# Patient Record
Sex: FEMALE | Race: WHITE | NOT HISPANIC OR LATINO | URBAN - METROPOLITAN AREA
[De-identification: names, ages, dates, MRNs, and addresses within clinical notes are randomized per-mention and may not be internally consistent; named-entity substitution may affect disease eponyms.]

---

## 2017-09-12 ENCOUNTER — EMERGENCY (EMERGENCY)
Facility: HOSPITAL | Age: 77
LOS: 0 days | Discharge: HOME | End: 2017-09-12
Attending: EMERGENCY MEDICINE | Admitting: EMERGENCY MEDICINE
Payer: MEDICARE

## 2017-09-12 VITALS
TEMPERATURE: 98 F | OXYGEN SATURATION: 99 % | SYSTOLIC BLOOD PRESSURE: 138 MMHG | HEART RATE: 74 BPM | RESPIRATION RATE: 18 BRPM | DIASTOLIC BLOOD PRESSURE: 74 MMHG

## 2017-09-12 VITALS
OXYGEN SATURATION: 99 % | DIASTOLIC BLOOD PRESSURE: 72 MMHG | RESPIRATION RATE: 18 BRPM | HEART RATE: 71 BPM | SYSTOLIC BLOOD PRESSURE: 135 MMHG | TEMPERATURE: 98 F

## 2017-09-12 DIAGNOSIS — N39.0 URINARY TRACT INFECTION, SITE NOT SPECIFIED: ICD-10-CM

## 2017-09-12 DIAGNOSIS — M47.9 SPONDYLOSIS, UNSPECIFIED: ICD-10-CM

## 2017-09-12 DIAGNOSIS — G62.9 POLYNEUROPATHY, UNSPECIFIED: ICD-10-CM

## 2017-09-12 DIAGNOSIS — R29.6 REPEATED FALLS: ICD-10-CM

## 2017-09-12 DIAGNOSIS — M50.30 OTHER CERVICAL DISC DEGENERATION, UNSPECIFIED CERVICAL REGION: ICD-10-CM

## 2017-09-12 DIAGNOSIS — I67.89 OTHER CEREBROVASCULAR DISEASE: ICD-10-CM

## 2017-09-12 DIAGNOSIS — M54.9 DORSALGIA, UNSPECIFIED: ICD-10-CM

## 2017-09-12 DIAGNOSIS — G20 PARKINSON'S DISEASE: ICD-10-CM

## 2017-09-12 DIAGNOSIS — M41.9 SCOLIOSIS, UNSPECIFIED: ICD-10-CM

## 2017-09-12 LAB
ALBUMIN SERPL ELPH-MCNC: 4 G/DL — SIGNIFICANT CHANGE UP (ref 3.3–5)
ALP SERPL-CCNC: 74 U/L — SIGNIFICANT CHANGE UP (ref 40–120)
ALT FLD-CCNC: 10 U/L — LOW (ref 12–78)
ANION GAP SERPL CALC-SCNC: 7 MMOL/L — SIGNIFICANT CHANGE UP (ref 5–17)
APPEARANCE UR: CLEAR — SIGNIFICANT CHANGE UP
AST SERPL-CCNC: 24 U/L — SIGNIFICANT CHANGE UP (ref 15–37)
BACTERIA # UR AUTO: (no result)
BASOPHILS # BLD AUTO: 0.1 K/UL — SIGNIFICANT CHANGE UP (ref 0–0.2)
BASOPHILS NFR BLD AUTO: 1.5 % — SIGNIFICANT CHANGE UP (ref 0–2)
BILIRUB SERPL-MCNC: 0.4 MG/DL — SIGNIFICANT CHANGE UP (ref 0.2–1.2)
BILIRUB UR-MCNC: NEGATIVE — SIGNIFICANT CHANGE UP
BUN SERPL-MCNC: 21 MG/DL — SIGNIFICANT CHANGE UP (ref 7–23)
CALCIUM SERPL-MCNC: 9.3 MG/DL — SIGNIFICANT CHANGE UP (ref 8.5–10.1)
CHLORIDE SERPL-SCNC: 103 MMOL/L — SIGNIFICANT CHANGE UP (ref 96–108)
CO2 SERPL-SCNC: 31 MMOL/L — SIGNIFICANT CHANGE UP (ref 22–31)
COLOR SPEC: YELLOW — SIGNIFICANT CHANGE UP
CREAT SERPL-MCNC: 0.89 MG/DL — SIGNIFICANT CHANGE UP (ref 0.5–1.3)
DIFF PNL FLD: NEGATIVE — SIGNIFICANT CHANGE UP
EOSINOPHIL # BLD AUTO: 0.1 K/UL — SIGNIFICANT CHANGE UP (ref 0–0.5)
EOSINOPHIL NFR BLD AUTO: 1.7 % — SIGNIFICANT CHANGE UP (ref 0–6)
EPI CELLS # UR: SIGNIFICANT CHANGE UP
GLUCOSE SERPL-MCNC: 120 MG/DL — HIGH (ref 70–99)
GLUCOSE UR QL: NEGATIVE MG/DL — SIGNIFICANT CHANGE UP
HCT VFR BLD CALC: 38.7 % — SIGNIFICANT CHANGE UP (ref 34.5–45)
HGB BLD-MCNC: 12.9 G/DL — SIGNIFICANT CHANGE UP (ref 11.5–15.5)
KETONES UR-MCNC: NEGATIVE — SIGNIFICANT CHANGE UP
LEUKOCYTE ESTERASE UR-ACNC: (no result)
LYMPHOCYTES # BLD AUTO: 2.2 K/UL — SIGNIFICANT CHANGE UP (ref 1–3.3)
LYMPHOCYTES # BLD AUTO: 32.3 % — SIGNIFICANT CHANGE UP (ref 13–44)
MCHC RBC-ENTMCNC: 30 PG — SIGNIFICANT CHANGE UP (ref 27–34)
MCHC RBC-ENTMCNC: 33.3 GM/DL — SIGNIFICANT CHANGE UP (ref 32–36)
MCV RBC AUTO: 90 FL — SIGNIFICANT CHANGE UP (ref 80–100)
MONOCYTES # BLD AUTO: 0.8 K/UL — SIGNIFICANT CHANGE UP (ref 0–0.9)
MONOCYTES NFR BLD AUTO: 11.6 % — SIGNIFICANT CHANGE UP (ref 2–14)
NEUTROPHILS # BLD AUTO: 3.6 K/UL — SIGNIFICANT CHANGE UP (ref 1.8–7.4)
NEUTROPHILS NFR BLD AUTO: 52.8 % — SIGNIFICANT CHANGE UP (ref 43–77)
NITRITE UR-MCNC: NEGATIVE — SIGNIFICANT CHANGE UP
PH UR: 7 — SIGNIFICANT CHANGE UP (ref 5–8)
PLATELET # BLD AUTO: 270 K/UL — SIGNIFICANT CHANGE UP (ref 150–400)
POTASSIUM SERPL-MCNC: 3.8 MMOL/L — SIGNIFICANT CHANGE UP (ref 3.5–5.3)
POTASSIUM SERPL-SCNC: 3.8 MMOL/L — SIGNIFICANT CHANGE UP (ref 3.5–5.3)
PROT SERPL-MCNC: 6.9 GM/DL — SIGNIFICANT CHANGE UP (ref 6–8.3)
PROT UR-MCNC: NEGATIVE MG/DL — SIGNIFICANT CHANGE UP
RBC # BLD: 4.3 M/UL — SIGNIFICANT CHANGE UP (ref 3.8–5.2)
RBC # FLD: 12.5 % — SIGNIFICANT CHANGE UP (ref 10.3–14.5)
RBC CASTS # UR COMP ASSIST: NEGATIVE /HPF — SIGNIFICANT CHANGE UP (ref 0–4)
SODIUM SERPL-SCNC: 141 MMOL/L — SIGNIFICANT CHANGE UP (ref 135–145)
SP GR SPEC: 1.01 — SIGNIFICANT CHANGE UP (ref 1.01–1.02)
UROBILINOGEN FLD QL: NEGATIVE MG/DL — SIGNIFICANT CHANGE UP
WBC # BLD: 6.8 K/UL — SIGNIFICANT CHANGE UP (ref 3.8–10.5)
WBC # FLD AUTO: 6.8 K/UL — SIGNIFICANT CHANGE UP (ref 3.8–10.5)
WBC UR QL: SIGNIFICANT CHANGE UP

## 2017-09-12 PROCEDURE — 99284 EMERGENCY DEPT VISIT MOD MDM: CPT

## 2017-09-12 PROCEDURE — 72190 X-RAY EXAM OF PELVIS: CPT | Mod: 26

## 2017-09-12 PROCEDURE — 70450 CT HEAD/BRAIN W/O DYE: CPT | Mod: 26

## 2017-09-12 PROCEDURE — 72125 CT NECK SPINE W/O DYE: CPT | Mod: 26

## 2017-09-12 PROCEDURE — 71010: CPT | Mod: 26

## 2017-09-12 PROCEDURE — 93010 ELECTROCARDIOGRAM REPORT: CPT

## 2017-09-12 RX ORDER — CEPHALEXIN 500 MG
500 CAPSULE ORAL ONCE
Qty: 0 | Refills: 0 | Status: DISCONTINUED | OUTPATIENT
Start: 2017-09-12 | End: 2017-09-12

## 2017-09-12 RX ORDER — CEPHALEXIN 500 MG
1 CAPSULE ORAL
Qty: 30 | Refills: 0 | OUTPATIENT
Start: 2017-09-12 | End: 2017-09-22

## 2017-09-12 NOTE — ED PROVIDER NOTE - MEDICAL DECISION MAKING DETAILS
Pt with 2 falls today at [facility], 1 fall recently, no complaints on arrival. Plan labs/ua, CTH CTCspine, XRs, reeval

## 2017-09-12 NOTE — ED PROVIDER NOTE - OBJECTIVE STATEMENT
76 y/o F w/ hx of Parkinson's dz, neuropathy, scoliosis, presents to ED s/p 2 falls today. Pt also fell 2 days ago. As per family at bedside the pt's mental status is per baseline. Pt states in morning she is dizzy and pt normally has back pain but right  now denies pain, dizziness, lightheadedness, urinary sx, or any other acute complaints.

## 2017-09-13 LAB
CULTURE RESULTS: SIGNIFICANT CHANGE UP
SPECIMEN SOURCE: SIGNIFICANT CHANGE UP

## 2017-09-22 ENCOUNTER — EMERGENCY (EMERGENCY)
Facility: HOSPITAL | Age: 77
LOS: 0 days | Discharge: HOME | End: 2017-09-22
Attending: EMERGENCY MEDICINE | Admitting: EMERGENCY MEDICINE
Payer: MEDICARE

## 2017-09-22 VITALS
RESPIRATION RATE: 16 BRPM | HEIGHT: 66 IN | HEART RATE: 68 BPM | OXYGEN SATURATION: 97 % | WEIGHT: 164.91 LBS | DIASTOLIC BLOOD PRESSURE: 95 MMHG | TEMPERATURE: 98 F | SYSTOLIC BLOOD PRESSURE: 190 MMHG

## 2017-09-22 DIAGNOSIS — F02.80 DEMENTIA IN OTHER DISEASES CLASSIFIED ELSEWHERE, UNSPECIFIED SEVERITY, WITHOUT BEHAVIORAL DISTURBANCE, PSYCHOTIC DISTURBANCE, MOOD DISTURBANCE, AND ANXIETY: ICD-10-CM

## 2017-09-22 DIAGNOSIS — R41.82 ALTERED MENTAL STATUS, UNSPECIFIED: ICD-10-CM

## 2017-09-22 DIAGNOSIS — G20 PARKINSON'S DISEASE: ICD-10-CM

## 2017-09-22 LAB
ALBUMIN SERPL ELPH-MCNC: 3.7 G/DL — SIGNIFICANT CHANGE UP (ref 3.3–5)
ALP SERPL-CCNC: 78 U/L — SIGNIFICANT CHANGE UP (ref 40–120)
ALT FLD-CCNC: 13 U/L — SIGNIFICANT CHANGE UP (ref 12–78)
ANION GAP SERPL CALC-SCNC: 3 MMOL/L — LOW (ref 5–17)
AST SERPL-CCNC: 26 U/L — SIGNIFICANT CHANGE UP (ref 15–37)
BILIRUB SERPL-MCNC: 0.3 MG/DL — SIGNIFICANT CHANGE UP (ref 0.2–1.2)
BUN SERPL-MCNC: 21 MG/DL — SIGNIFICANT CHANGE UP (ref 7–23)
CALCIUM SERPL-MCNC: 9.2 MG/DL — SIGNIFICANT CHANGE UP (ref 8.5–10.1)
CHLORIDE SERPL-SCNC: 109 MMOL/L — HIGH (ref 96–108)
CO2 SERPL-SCNC: 31 MMOL/L — SIGNIFICANT CHANGE UP (ref 22–31)
CREAT SERPL-MCNC: 0.72 MG/DL — SIGNIFICANT CHANGE UP (ref 0.5–1.3)
GLUCOSE SERPL-MCNC: 84 MG/DL — SIGNIFICANT CHANGE UP (ref 70–99)
HCT VFR BLD CALC: 41.1 % — SIGNIFICANT CHANGE UP (ref 34.5–45)
HGB BLD-MCNC: 13.7 G/DL — SIGNIFICANT CHANGE UP (ref 11.5–15.5)
MCHC RBC-ENTMCNC: 30.5 PG — SIGNIFICANT CHANGE UP (ref 27–34)
MCHC RBC-ENTMCNC: 33.2 GM/DL — SIGNIFICANT CHANGE UP (ref 32–36)
MCV RBC AUTO: 91.8 FL — SIGNIFICANT CHANGE UP (ref 80–100)
PLATELET # BLD AUTO: 260 K/UL — SIGNIFICANT CHANGE UP (ref 150–400)
POTASSIUM SERPL-MCNC: 4.1 MMOL/L — SIGNIFICANT CHANGE UP (ref 3.5–5.3)
POTASSIUM SERPL-SCNC: 4.1 MMOL/L — SIGNIFICANT CHANGE UP (ref 3.5–5.3)
PROT SERPL-MCNC: 7 GM/DL — SIGNIFICANT CHANGE UP (ref 6–8.3)
RBC # BLD: 4.48 M/UL — SIGNIFICANT CHANGE UP (ref 3.8–5.2)
RBC # FLD: 12.3 % — SIGNIFICANT CHANGE UP (ref 10.3–14.5)
SODIUM SERPL-SCNC: 143 MMOL/L — SIGNIFICANT CHANGE UP (ref 135–145)
WBC # BLD: 6.1 K/UL — SIGNIFICANT CHANGE UP (ref 3.8–10.5)
WBC # FLD AUTO: 6.1 K/UL — SIGNIFICANT CHANGE UP (ref 3.8–10.5)

## 2017-09-22 PROCEDURE — 93010 ELECTROCARDIOGRAM REPORT: CPT

## 2017-09-22 PROCEDURE — 70450 CT HEAD/BRAIN W/O DYE: CPT | Mod: 26

## 2017-09-22 PROCEDURE — 99285 EMERGENCY DEPT VISIT HI MDM: CPT

## 2017-09-22 NOTE — ED ADULT NURSE NOTE - CHIEF COMPLAINT QUOTE
staff at [facility] states they entered room patient awake, but not responding to questions, when EMS arrived patient states she was faking it bcs she did not want to speak to staff

## 2017-09-22 NOTE — ED ADULT NURSE NOTE - OBJECTIVE STATEMENT
Pt was reported to have her eyes open but not responding to staff at [facility]. When the patient was asked why she wasn't responding she stated " I don't like people bothering me in the morning and I didn't want to talk to them" . pt has no current complaints.

## 2017-09-22 NOTE — ED PROVIDER NOTE - OBJECTIVE STATEMENT
76 y/o with PMHx of dementia and Parkinson's disease sent to the ED from [facility] after pt was found unresponsive in bed this morning. pt was found wet and unreponsive for a few minutes by staff at nursing home. Pt states that she got up to use the shower and walked back to her bed. Hx is unreliable due to dementia. No hx of fall. No CP or SOB. 78 y/o with PMHx of dementia and Parkinson's disease sent to the ED from [facility] after pt was found alert in bed this morning but was not verbally responsive to staff. pt was found wet in bed this morning and was not answering any questions from staff members. Pt states that she got up to use the shower and walked back to her bed. pt was not answering questions because she did not want to speak with staff members. Hx is unreliable due to dementia. No hx of fall. No CP or SOB.

## 2017-09-22 NOTE — ED PROVIDER NOTE - PROGRESS NOTE DETAILS
Pt appears well. Laugh ting and making jokes. Unclear what happened or whether it was do to her dementia but they will f/u with neurology.

## 2017-09-22 NOTE — ED PROVIDER NOTE - CONSTITUTIONAL, MLM
normal... Well appearing, well nourished, awake, alert, oriented to person, place, time/situation and in no apparent distress. Pt is pleasant.

## 2017-09-22 NOTE — ED ADULT TRIAGE NOTE - CHIEF COMPLAINT QUOTE
staff at [facility] states they entered room patient awake, but not responding to questions, when EMS arrived patient states she was faking it bcs she did not want to speak to staff

## 2017-11-11 ENCOUNTER — RESULT REVIEW (OUTPATIENT)
Age: 77
End: 2017-11-11

## 2017-11-11 ENCOUNTER — INPATIENT (INPATIENT)
Facility: HOSPITAL | Age: 77
LOS: 1 days | Discharge: NURSING, REHAB OR LONG-TERM CARE | End: 2017-11-13
Attending: INTERNAL MEDICINE | Admitting: FAMILY MEDICINE
Payer: MEDICARE

## 2017-11-11 VITALS
HEART RATE: 74 BPM | SYSTOLIC BLOOD PRESSURE: 120 MMHG | RESPIRATION RATE: 16 BRPM | DIASTOLIC BLOOD PRESSURE: 58 MMHG | WEIGHT: 121.03 LBS | TEMPERATURE: 98 F | HEIGHT: 63 IN | OXYGEN SATURATION: 100 %

## 2017-11-11 DIAGNOSIS — G62.9 POLYNEUROPATHY, UNSPECIFIED: ICD-10-CM

## 2017-11-11 DIAGNOSIS — S72.009A FRACTURE OF UNSPECIFIED PART OF NECK OF UNSPECIFIED FEMUR, INITIAL ENCOUNTER FOR CLOSED FRACTURE: ICD-10-CM

## 2017-11-11 DIAGNOSIS — E87.6 HYPOKALEMIA: ICD-10-CM

## 2017-11-11 DIAGNOSIS — G20 PARKINSON'S DISEASE: ICD-10-CM

## 2017-11-11 DIAGNOSIS — Z96.649 PRESENCE OF UNSPECIFIED ARTIFICIAL HIP JOINT: Chronic | ICD-10-CM

## 2017-11-11 LAB
ABO RH CONFIRMATION: SIGNIFICANT CHANGE UP
ANION GAP SERPL CALC-SCNC: 4 MMOL/L — LOW (ref 5–17)
ANION GAP SERPL CALC-SCNC: 5 MMOL/L — SIGNIFICANT CHANGE UP (ref 5–17)
APPEARANCE UR: CLEAR — SIGNIFICANT CHANGE UP
APTT BLD: 31.1 SEC — SIGNIFICANT CHANGE UP (ref 27.5–37.4)
BASOPHILS # BLD AUTO: 0.1 K/UL — SIGNIFICANT CHANGE UP (ref 0–0.2)
BASOPHILS NFR BLD AUTO: 0.8 % — SIGNIFICANT CHANGE UP (ref 0–2)
BILIRUB UR-MCNC: NEGATIVE — SIGNIFICANT CHANGE UP
BLD GP AB SCN SERPL QL: SIGNIFICANT CHANGE UP
BUN SERPL-MCNC: 21 MG/DL — SIGNIFICANT CHANGE UP (ref 7–23)
BUN SERPL-MCNC: 29 MG/DL — HIGH (ref 7–23)
CALCIUM SERPL-MCNC: 7.7 MG/DL — LOW (ref 8.5–10.1)
CALCIUM SERPL-MCNC: 8.9 MG/DL — SIGNIFICANT CHANGE UP (ref 8.5–10.1)
CHLORIDE SERPL-SCNC: 106 MMOL/L — SIGNIFICANT CHANGE UP (ref 96–108)
CHLORIDE SERPL-SCNC: 108 MMOL/L — SIGNIFICANT CHANGE UP (ref 96–108)
CO2 SERPL-SCNC: 27 MMOL/L — SIGNIFICANT CHANGE UP (ref 22–31)
CO2 SERPL-SCNC: 30 MMOL/L — SIGNIFICANT CHANGE UP (ref 22–31)
COLOR SPEC: YELLOW — SIGNIFICANT CHANGE UP
CREAT SERPL-MCNC: 0.84 MG/DL — SIGNIFICANT CHANGE UP (ref 0.5–1.3)
CREAT SERPL-MCNC: 1.15 MG/DL — SIGNIFICANT CHANGE UP (ref 0.5–1.3)
DIFF PNL FLD: NEGATIVE — SIGNIFICANT CHANGE UP
EOSINOPHIL # BLD AUTO: 0.1 K/UL — SIGNIFICANT CHANGE UP (ref 0–0.5)
EOSINOPHIL NFR BLD AUTO: 1 % — SIGNIFICANT CHANGE UP (ref 0–6)
GLUCOSE SERPL-MCNC: 153 MG/DL — HIGH (ref 70–99)
GLUCOSE SERPL-MCNC: 96 MG/DL — SIGNIFICANT CHANGE UP (ref 70–99)
GLUCOSE UR QL: NEGATIVE MG/DL — SIGNIFICANT CHANGE UP
HCT VFR BLD CALC: 35.6 % — SIGNIFICANT CHANGE UP (ref 34.5–45)
HCT VFR BLD CALC: 37.2 % — SIGNIFICANT CHANGE UP (ref 34.5–45)
HGB BLD-MCNC: 11.3 G/DL — LOW (ref 11.5–15.5)
HGB BLD-MCNC: 12.3 G/DL — SIGNIFICANT CHANGE UP (ref 11.5–15.5)
INR BLD: 0.95 RATIO — SIGNIFICANT CHANGE UP (ref 0.88–1.16)
KETONES UR-MCNC: NEGATIVE — SIGNIFICANT CHANGE UP
LEUKOCYTE ESTERASE UR-ACNC: NEGATIVE — SIGNIFICANT CHANGE UP
LYMPHOCYTES # BLD AUTO: 19 % — SIGNIFICANT CHANGE UP (ref 13–44)
LYMPHOCYTES # BLD AUTO: 2.2 K/UL — SIGNIFICANT CHANGE UP (ref 1–3.3)
MCHC RBC-ENTMCNC: 29.5 PG — SIGNIFICANT CHANGE UP (ref 27–34)
MCHC RBC-ENTMCNC: 30.3 PG — SIGNIFICANT CHANGE UP (ref 27–34)
MCHC RBC-ENTMCNC: 31.8 GM/DL — LOW (ref 32–36)
MCHC RBC-ENTMCNC: 33.1 GM/DL — SIGNIFICANT CHANGE UP (ref 32–36)
MCV RBC AUTO: 91.5 FL — SIGNIFICANT CHANGE UP (ref 80–100)
MCV RBC AUTO: 92.9 FL — SIGNIFICANT CHANGE UP (ref 80–100)
MONOCYTES # BLD AUTO: 1.2 K/UL — HIGH (ref 0–0.9)
MONOCYTES NFR BLD AUTO: 10.3 % — SIGNIFICANT CHANGE UP (ref 2–14)
NEUTROPHILS # BLD AUTO: 7.9 K/UL — HIGH (ref 1.8–7.4)
NEUTROPHILS NFR BLD AUTO: 68.9 % — SIGNIFICANT CHANGE UP (ref 43–77)
NITRITE UR-MCNC: NEGATIVE — SIGNIFICANT CHANGE UP
PH UR: 7 — SIGNIFICANT CHANGE UP (ref 5–8)
PLATELET # BLD AUTO: 240 K/UL — SIGNIFICANT CHANGE UP (ref 150–400)
PLATELET # BLD AUTO: 263 K/UL — SIGNIFICANT CHANGE UP (ref 150–400)
POTASSIUM SERPL-MCNC: 3.3 MMOL/L — LOW (ref 3.5–5.3)
POTASSIUM SERPL-MCNC: 3.7 MMOL/L — SIGNIFICANT CHANGE UP (ref 3.5–5.3)
POTASSIUM SERPL-SCNC: 3.3 MMOL/L — LOW (ref 3.5–5.3)
POTASSIUM SERPL-SCNC: 3.7 MMOL/L — SIGNIFICANT CHANGE UP (ref 3.5–5.3)
PROT UR-MCNC: NEGATIVE MG/DL — SIGNIFICANT CHANGE UP
PROTHROM AB SERPL-ACNC: 10.2 SEC — SIGNIFICANT CHANGE UP (ref 9.8–12.7)
RBC # BLD: 3.83 M/UL — SIGNIFICANT CHANGE UP (ref 3.8–5.2)
RBC # BLD: 4.07 M/UL — SIGNIFICANT CHANGE UP (ref 3.8–5.2)
RBC # FLD: 12.1 % — SIGNIFICANT CHANGE UP (ref 10.3–14.5)
RBC # FLD: 12.3 % — SIGNIFICANT CHANGE UP (ref 10.3–14.5)
SODIUM SERPL-SCNC: 139 MMOL/L — SIGNIFICANT CHANGE UP (ref 135–145)
SODIUM SERPL-SCNC: 141 MMOL/L — SIGNIFICANT CHANGE UP (ref 135–145)
SP GR SPEC: 1.01 — SIGNIFICANT CHANGE UP (ref 1.01–1.02)
TYPE + AB SCN PNL BLD: SIGNIFICANT CHANGE UP
UROBILINOGEN FLD QL: NEGATIVE MG/DL — SIGNIFICANT CHANGE UP
WBC # BLD: 11.4 K/UL — HIGH (ref 3.8–10.5)
WBC # BLD: 17 K/UL — HIGH (ref 3.8–10.5)
WBC # FLD AUTO: 11.4 K/UL — HIGH (ref 3.8–10.5)
WBC # FLD AUTO: 17 K/UL — HIGH (ref 3.8–10.5)

## 2017-11-11 PROCEDURE — 88311 DECALCIFY TISSUE: CPT | Mod: 26

## 2017-11-11 PROCEDURE — 73502 X-RAY EXAM HIP UNI 2-3 VIEWS: CPT | Mod: 26

## 2017-11-11 PROCEDURE — 99221 1ST HOSP IP/OBS SF/LOW 40: CPT

## 2017-11-11 PROCEDURE — 71010: CPT | Mod: 26

## 2017-11-11 PROCEDURE — 99285 EMERGENCY DEPT VISIT HI MDM: CPT

## 2017-11-11 PROCEDURE — 73501 X-RAY EXAM HIP UNI 1 VIEW: CPT | Mod: 26,59,LT

## 2017-11-11 PROCEDURE — 88305 TISSUE EXAM BY PATHOLOGIST: CPT | Mod: 26

## 2017-11-11 PROCEDURE — 93010 ELECTROCARDIOGRAM REPORT: CPT

## 2017-11-11 PROCEDURE — 73552 X-RAY EXAM OF FEMUR 2/>: CPT | Mod: 26

## 2017-11-11 RX ORDER — CLONAZEPAM 1 MG
0.5 TABLET ORAL AT BEDTIME
Qty: 0 | Refills: 0 | Status: DISCONTINUED | OUTPATIENT
Start: 2017-11-11 | End: 2017-11-13

## 2017-11-11 RX ORDER — DOCUSATE SODIUM 100 MG
100 CAPSULE ORAL THREE TIMES A DAY
Qty: 0 | Refills: 0 | Status: DISCONTINUED | OUTPATIENT
Start: 2017-11-11 | End: 2017-11-13

## 2017-11-11 RX ORDER — CARBIDOPA AND LEVODOPA 25; 100 MG/1; MG/1
2 TABLET ORAL AT BEDTIME
Qty: 0 | Refills: 0 | Status: DISCONTINUED | OUTPATIENT
Start: 2017-11-11 | End: 2017-11-13

## 2017-11-11 RX ORDER — GABAPENTIN 400 MG/1
600 CAPSULE ORAL THREE TIMES A DAY
Qty: 0 | Refills: 0 | Status: DISCONTINUED | OUTPATIENT
Start: 2017-11-11 | End: 2017-11-13

## 2017-11-11 RX ORDER — DOCUSATE SODIUM 100 MG
100 CAPSULE ORAL
Qty: 0 | Refills: 0 | Status: DISCONTINUED | OUTPATIENT
Start: 2017-11-11 | End: 2017-11-13

## 2017-11-11 RX ORDER — CHOLECALCIFEROL (VITAMIN D3) 125 MCG
1000 CAPSULE ORAL DAILY
Qty: 0 | Refills: 0 | Status: DISCONTINUED | OUTPATIENT
Start: 2017-11-11 | End: 2017-11-13

## 2017-11-11 RX ORDER — MORPHINE SULFATE 50 MG/1
4 CAPSULE, EXTENDED RELEASE ORAL ONCE
Qty: 0 | Refills: 0 | Status: DISCONTINUED | OUTPATIENT
Start: 2017-11-11 | End: 2017-11-11

## 2017-11-11 RX ORDER — RASAGILINE 0.5 MG/1
1 TABLET ORAL DAILY
Qty: 0 | Refills: 0 | Status: DISCONTINUED | OUTPATIENT
Start: 2017-11-11 | End: 2017-11-13

## 2017-11-11 RX ORDER — DULOXETINE HYDROCHLORIDE 30 MG/1
30 CAPSULE, DELAYED RELEASE ORAL DAILY
Qty: 0 | Refills: 0 | Status: DISCONTINUED | OUTPATIENT
Start: 2017-11-11 | End: 2017-11-13

## 2017-11-11 RX ORDER — HYDROMORPHONE HYDROCHLORIDE 2 MG/ML
0.5 INJECTION INTRAMUSCULAR; INTRAVENOUS; SUBCUTANEOUS
Qty: 0 | Refills: 0 | Status: DISCONTINUED | OUTPATIENT
Start: 2017-11-11 | End: 2017-11-11

## 2017-11-11 RX ORDER — OXYCODONE HYDROCHLORIDE 5 MG/1
5 TABLET ORAL EVERY 4 HOURS
Qty: 0 | Refills: 0 | Status: DISCONTINUED | OUTPATIENT
Start: 2017-11-11 | End: 2017-11-13

## 2017-11-11 RX ORDER — FENTANYL CITRATE 50 UG/ML
50 INJECTION INTRAVENOUS
Qty: 0 | Refills: 0 | Status: DISCONTINUED | OUTPATIENT
Start: 2017-11-11 | End: 2017-11-11

## 2017-11-11 RX ORDER — MEPERIDINE HYDROCHLORIDE 50 MG/ML
12.5 INJECTION INTRAMUSCULAR; INTRAVENOUS; SUBCUTANEOUS
Qty: 0 | Refills: 0 | Status: DISCONTINUED | OUTPATIENT
Start: 2017-11-11 | End: 2017-11-11

## 2017-11-11 RX ORDER — ACETAMINOPHEN 500 MG
1000 TABLET ORAL ONCE
Qty: 0 | Refills: 0 | Status: COMPLETED | OUTPATIENT
Start: 2017-11-11 | End: 2017-11-11

## 2017-11-11 RX ORDER — CHOLECALCIFEROL (VITAMIN D3) 125 MCG
1000 CAPSULE ORAL DAILY
Qty: 0 | Refills: 0 | Status: DISCONTINUED | OUTPATIENT
Start: 2017-11-11 | End: 2017-11-11

## 2017-11-11 RX ORDER — HYDROMORPHONE HYDROCHLORIDE 2 MG/ML
0.5 INJECTION INTRAMUSCULAR; INTRAVENOUS; SUBCUTANEOUS
Qty: 0 | Refills: 0 | Status: DISCONTINUED | OUTPATIENT
Start: 2017-11-11 | End: 2017-11-13

## 2017-11-11 RX ORDER — CEFAZOLIN SODIUM 1 G
2000 VIAL (EA) INJECTION EVERY 8 HOURS
Qty: 0 | Refills: 0 | Status: COMPLETED | OUTPATIENT
Start: 2017-11-11 | End: 2017-11-12

## 2017-11-11 RX ORDER — FERROUS SULFATE 325(65) MG
325 TABLET ORAL
Qty: 0 | Refills: 0 | Status: DISCONTINUED | OUTPATIENT
Start: 2017-11-11 | End: 2017-11-13

## 2017-11-11 RX ORDER — CARBIDOPA AND LEVODOPA 25; 100 MG/1; MG/1
2 TABLET ORAL
Qty: 0 | Refills: 0 | Status: DISCONTINUED | OUTPATIENT
Start: 2017-11-11 | End: 2017-11-13

## 2017-11-11 RX ORDER — ENOXAPARIN SODIUM 100 MG/ML
40 INJECTION SUBCUTANEOUS EVERY 24 HOURS
Qty: 0 | Refills: 0 | Status: DISCONTINUED | OUTPATIENT
Start: 2017-11-12 | End: 2017-11-13

## 2017-11-11 RX ORDER — SENNA PLUS 8.6 MG/1
2 TABLET ORAL AT BEDTIME
Qty: 0 | Refills: 0 | Status: DISCONTINUED | OUTPATIENT
Start: 2017-11-11 | End: 2017-11-13

## 2017-11-11 RX ORDER — CARBIDOPA AND LEVODOPA 25; 100 MG/1; MG/1
2 TABLET ORAL DAILY
Qty: 0 | Refills: 0 | Status: DISCONTINUED | OUTPATIENT
Start: 2017-11-11 | End: 2017-11-11

## 2017-11-11 RX ORDER — MAGNESIUM HYDROXIDE 400 MG/1
30 TABLET, CHEWABLE ORAL
Qty: 0 | Refills: 0 | Status: DISCONTINUED | OUTPATIENT
Start: 2017-11-11 | End: 2017-11-13

## 2017-11-11 RX ORDER — CARBIDOPA AND LEVODOPA 25; 100 MG/1; MG/1
2 TABLET ORAL AT BEDTIME
Qty: 0 | Refills: 0 | Status: DISCONTINUED | OUTPATIENT
Start: 2017-11-11 | End: 2017-11-11

## 2017-11-11 RX ORDER — SODIUM CHLORIDE 9 MG/ML
1000 INJECTION INTRAMUSCULAR; INTRAVENOUS; SUBCUTANEOUS
Qty: 0 | Refills: 0 | Status: DISCONTINUED | OUTPATIENT
Start: 2017-11-11 | End: 2017-11-11

## 2017-11-11 RX ORDER — RASAGILINE 0.5 MG/1
1 TABLET ORAL DAILY
Qty: 0 | Refills: 0 | Status: DISCONTINUED | OUTPATIENT
Start: 2017-11-11 | End: 2017-11-11

## 2017-11-11 RX ORDER — DULOXETINE HYDROCHLORIDE 30 MG/1
30 CAPSULE, DELAYED RELEASE ORAL DAILY
Qty: 0 | Refills: 0 | Status: DISCONTINUED | OUTPATIENT
Start: 2017-11-11 | End: 2017-11-11

## 2017-11-11 RX ORDER — SENNA PLUS 8.6 MG/1
2 TABLET ORAL AT BEDTIME
Qty: 0 | Refills: 0 | Status: DISCONTINUED | OUTPATIENT
Start: 2017-11-11 | End: 2017-11-11

## 2017-11-11 RX ORDER — LABETALOL HCL 100 MG
10 TABLET ORAL ONCE
Qty: 0 | Refills: 0 | Status: COMPLETED | OUTPATIENT
Start: 2017-11-11 | End: 2017-11-11

## 2017-11-11 RX ORDER — CALCIUM CARBONATE 500(1250)
1 TABLET ORAL DAILY
Qty: 0 | Refills: 0 | Status: DISCONTINUED | OUTPATIENT
Start: 2017-11-11 | End: 2017-11-13

## 2017-11-11 RX ORDER — DOCUSATE SODIUM 100 MG
100 CAPSULE ORAL
Qty: 0 | Refills: 0 | Status: DISCONTINUED | OUTPATIENT
Start: 2017-11-11 | End: 2017-11-11

## 2017-11-11 RX ORDER — CALCIUM CARBONATE 500(1250)
1 TABLET ORAL DAILY
Qty: 0 | Refills: 0 | Status: DISCONTINUED | OUTPATIENT
Start: 2017-11-11 | End: 2017-11-11

## 2017-11-11 RX ORDER — SODIUM CHLORIDE 9 MG/ML
1000 INJECTION INTRAMUSCULAR; INTRAVENOUS; SUBCUTANEOUS ONCE
Qty: 0 | Refills: 0 | Status: COMPLETED | OUTPATIENT
Start: 2017-11-11 | End: 2017-11-11

## 2017-11-11 RX ORDER — MORPHINE SULFATE 50 MG/1
2 CAPSULE, EXTENDED RELEASE ORAL EVERY 4 HOURS
Qty: 0 | Refills: 0 | Status: DISCONTINUED | OUTPATIENT
Start: 2017-11-11 | End: 2017-11-11

## 2017-11-11 RX ORDER — CARBIDOPA AND LEVODOPA 25; 100 MG/1; MG/1
1.5 TABLET ORAL
Qty: 0 | Refills: 0 | Status: DISCONTINUED | OUTPATIENT
Start: 2017-11-11 | End: 2017-11-13

## 2017-11-11 RX ORDER — GABAPENTIN 400 MG/1
600 CAPSULE ORAL THREE TIMES A DAY
Qty: 0 | Refills: 0 | Status: DISCONTINUED | OUTPATIENT
Start: 2017-11-11 | End: 2017-11-11

## 2017-11-11 RX ORDER — ONDANSETRON 8 MG/1
4 TABLET, FILM COATED ORAL EVERY 6 HOURS
Qty: 0 | Refills: 0 | Status: DISCONTINUED | OUTPATIENT
Start: 2017-11-11 | End: 2017-11-13

## 2017-11-11 RX ORDER — CARBIDOPA AND LEVODOPA 25; 100 MG/1; MG/1
1.5 TABLET ORAL THREE TIMES A DAY
Qty: 0 | Refills: 0 | Status: DISCONTINUED | OUTPATIENT
Start: 2017-11-11 | End: 2017-11-11

## 2017-11-11 RX ORDER — POLYETHYLENE GLYCOL 3350 17 G/17G
17 POWDER, FOR SOLUTION ORAL DAILY
Qty: 0 | Refills: 0 | Status: DISCONTINUED | OUTPATIENT
Start: 2017-11-11 | End: 2017-11-13

## 2017-11-11 RX ORDER — ACETAMINOPHEN 500 MG
650 TABLET ORAL EVERY 6 HOURS
Qty: 0 | Refills: 0 | Status: DISCONTINUED | OUTPATIENT
Start: 2017-11-11 | End: 2017-11-13

## 2017-11-11 RX ORDER — SODIUM CHLORIDE 9 MG/ML
1000 INJECTION, SOLUTION INTRAVENOUS
Qty: 0 | Refills: 0 | Status: DISCONTINUED | OUTPATIENT
Start: 2017-11-11 | End: 2017-11-13

## 2017-11-11 RX ORDER — SODIUM CHLORIDE 9 MG/ML
1000 INJECTION, SOLUTION INTRAVENOUS
Qty: 0 | Refills: 0 | Status: DISCONTINUED | OUTPATIENT
Start: 2017-11-11 | End: 2017-11-11

## 2017-11-11 RX ORDER — ASCORBIC ACID 60 MG
500 TABLET,CHEWABLE ORAL
Qty: 0 | Refills: 0 | Status: DISCONTINUED | OUTPATIENT
Start: 2017-11-11 | End: 2017-11-13

## 2017-11-11 RX ORDER — ONDANSETRON 8 MG/1
8 TABLET, FILM COATED ORAL ONCE
Qty: 0 | Refills: 0 | Status: COMPLETED | OUTPATIENT
Start: 2017-11-11 | End: 2017-11-11

## 2017-11-11 RX ORDER — OXYCODONE HYDROCHLORIDE 5 MG/1
10 TABLET ORAL EVERY 4 HOURS
Qty: 0 | Refills: 0 | Status: DISCONTINUED | OUTPATIENT
Start: 2017-11-11 | End: 2017-11-13

## 2017-11-11 RX ORDER — QUETIAPINE FUMARATE 200 MG/1
25 TABLET, FILM COATED ORAL AT BEDTIME
Qty: 0 | Refills: 0 | Status: DISCONTINUED | OUTPATIENT
Start: 2017-11-11 | End: 2017-11-11

## 2017-11-11 RX ORDER — ACETAMINOPHEN 500 MG
1000 TABLET ORAL ONCE
Qty: 0 | Refills: 0 | Status: DISCONTINUED | OUTPATIENT
Start: 2017-11-11 | End: 2017-11-11

## 2017-11-11 RX ORDER — FOLIC ACID 0.8 MG
1 TABLET ORAL DAILY
Qty: 0 | Refills: 0 | Status: DISCONTINUED | OUTPATIENT
Start: 2017-11-11 | End: 2017-11-13

## 2017-11-11 RX ORDER — POTASSIUM CHLORIDE 20 MEQ
10 PACKET (EA) ORAL ONCE
Qty: 0 | Refills: 0 | Status: COMPLETED | OUTPATIENT
Start: 2017-11-11 | End: 2017-11-11

## 2017-11-11 RX ORDER — PANTOPRAZOLE SODIUM 20 MG/1
40 TABLET, DELAYED RELEASE ORAL DAILY
Qty: 0 | Refills: 0 | Status: DISCONTINUED | OUTPATIENT
Start: 2017-11-11 | End: 2017-11-13

## 2017-11-11 RX ORDER — HYDROMORPHONE HYDROCHLORIDE 2 MG/ML
1 INJECTION INTRAMUSCULAR; INTRAVENOUS; SUBCUTANEOUS ONCE
Qty: 0 | Refills: 0 | Status: DISCONTINUED | OUTPATIENT
Start: 2017-11-11 | End: 2017-11-11

## 2017-11-11 RX ORDER — ONDANSETRON 8 MG/1
4 TABLET, FILM COATED ORAL ONCE
Qty: 0 | Refills: 0 | Status: DISCONTINUED | OUTPATIENT
Start: 2017-11-11 | End: 2017-11-11

## 2017-11-11 RX ORDER — CLONAZEPAM 1 MG
0.5 TABLET ORAL AT BEDTIME
Qty: 0 | Refills: 0 | Status: DISCONTINUED | OUTPATIENT
Start: 2017-11-11 | End: 2017-11-11

## 2017-11-11 RX ORDER — QUETIAPINE FUMARATE 200 MG/1
25 TABLET, FILM COATED ORAL AT BEDTIME
Qty: 0 | Refills: 0 | Status: DISCONTINUED | OUTPATIENT
Start: 2017-11-11 | End: 2017-11-13

## 2017-11-11 RX ADMIN — Medication 400 MILLIGRAM(S): at 17:53

## 2017-11-11 RX ADMIN — MORPHINE SULFATE 4 MILLIGRAM(S): 50 CAPSULE, EXTENDED RELEASE ORAL at 03:35

## 2017-11-11 RX ADMIN — HYDROMORPHONE HYDROCHLORIDE 1 MILLIGRAM(S): 2 INJECTION INTRAMUSCULAR; INTRAVENOUS; SUBCUTANEOUS at 05:58

## 2017-11-11 RX ADMIN — MORPHINE SULFATE 4 MILLIGRAM(S): 50 CAPSULE, EXTENDED RELEASE ORAL at 03:06

## 2017-11-11 RX ADMIN — SODIUM CHLORIDE 75 MILLILITER(S): 9 INJECTION, SOLUTION INTRAVENOUS at 09:26

## 2017-11-11 RX ADMIN — MORPHINE SULFATE 2 MILLIGRAM(S): 50 CAPSULE, EXTENDED RELEASE ORAL at 09:25

## 2017-11-11 RX ADMIN — MORPHINE SULFATE 4 MILLIGRAM(S): 50 CAPSULE, EXTENDED RELEASE ORAL at 03:36

## 2017-11-11 RX ADMIN — SODIUM CHLORIDE 75 MILLILITER(S): 9 INJECTION, SOLUTION INTRAVENOUS at 06:58

## 2017-11-11 RX ADMIN — ONDANSETRON 8 MILLIGRAM(S): 8 TABLET, FILM COATED ORAL at 03:06

## 2017-11-11 RX ADMIN — MORPHINE SULFATE 4 MILLIGRAM(S): 50 CAPSULE, EXTENDED RELEASE ORAL at 04:00

## 2017-11-11 RX ADMIN — Medication 100 MILLIEQUIVALENT(S): at 09:25

## 2017-11-11 RX ADMIN — SODIUM CHLORIDE 1000 MILLILITER(S): 9 INJECTION INTRAMUSCULAR; INTRAVENOUS; SUBCUTANEOUS at 03:06

## 2017-11-11 RX ADMIN — Medication 10 MILLIGRAM(S): at 17:43

## 2017-11-11 RX ADMIN — Medication 0.5 MILLIGRAM(S): at 23:37

## 2017-11-11 RX ADMIN — MORPHINE SULFATE 4 MILLIGRAM(S): 50 CAPSULE, EXTENDED RELEASE ORAL at 11:14

## 2017-11-11 RX ADMIN — Medication 2.5 MILLIGRAM(S): at 12:30

## 2017-11-11 RX ADMIN — Medication 100 MILLIGRAM(S): at 22:56

## 2017-11-11 RX ADMIN — HYDROMORPHONE HYDROCHLORIDE 1 MILLIGRAM(S): 2 INJECTION INTRAMUSCULAR; INTRAVENOUS; SUBCUTANEOUS at 04:54

## 2017-11-11 NOTE — H&P ADULT - ASSESSMENT
76 yo femal with Hx. of Parkinson's, lumbar spinal stenosis/ scoliosis, Cr. low back pain, neuropathy, HTN, mild dementia, UTI, who fell last night in the [facility] living facility where she resides  was c/o of L hip pain. She was diagnosed with L fem. neck fracture and  referred for admission and surgery.

## 2017-11-11 NOTE — ED ADULT NURSE REASSESSMENT NOTE - NS ED NURSE REASSESS COMMENT FT1
O2 sat of 88% since the administration of Dilaudid. O2 1L provided with +effect. O2 sat @ 97% at this time. Will continue to monitor.

## 2017-11-11 NOTE — H&P ADULT - NSHPLABSRESULTS_GEN_ALL_CORE
12.3   11.4  )-----------( 263      ( 11 Nov 2017 03:00 )             37.2       11-11    141  |  106  |  29<H>  ----------------------------<  96  3.3<L>   |  30  |  1.15    Ca    8.9      11 Nov 2017 03:00    EKG NSR  CXJoey neg

## 2017-11-11 NOTE — H&P ADULT - HISTORY OF PRESENT ILLNESS
The patient is a 78 yo femal with Hx. of Parkinson's, lumbar spinal stenosis/ scoliosis, Cr. low back pain, neuropathy, HTN, mild dementia, UTI, who fell last night in the [facility] living facility where she resides  was c/o of L hip pain. She was diagnosed with L fem. neck fracture and  referred for admission and surgery.

## 2017-11-11 NOTE — H&P ADULT - PROBLEM SELECTOR PLAN 1
L fem neck fracture, the patient is in optimal medical condition and at mod. risk for surgery  The patient will most likely require APRIL post op. Discussed with the patient daughter who is present.

## 2017-11-11 NOTE — ED ADULT NURSE REASSESSMENT NOTE - NS ED NURSE REASSESS COMMENT FT1
Pt awake and c/o pain to Left hip. MD aware and to be monitored due to hx of desat with narcotics. Will continue to monitor.

## 2017-11-11 NOTE — ED ADULT NURSE REASSESSMENT NOTE - NS ED NURSE REASSESS COMMENT FT1
pt received in stretcher. Handoff given by DAVID OROZCO Pt A&Ox3. limited movement to LLE. C/O excruciating pain to Left hip. +external rotation, shortening. Pain med given as ordered and tolerated well. Breathing spontaneously on RA. No SOB or cough. VS as documented. Awaiting to be xray-ed at this time. All needs are met and safety maintained. Will continue to monitor.

## 2017-11-11 NOTE — H&P ADULT - FAMILY HISTORY
Father  Still living? No  Family history of Parkinson disease, Age at diagnosis: Age Unknown     Mother  Still living? No  Family history of age of death, Age at diagnosis: Age Unknown

## 2017-11-11 NOTE — ED PROVIDER NOTE - OBJECTIVE STATEMENT
Pt comes to the Ed s/p fall at the [facility]. Pt was visited by her family in the afternoon and states when they left she was looking for food and ate cookies from downstairs. Pt went back to her room and then states unsure how but fell on her side. 911 was called because of screaming from her room. Pt BIBEMS s/p fall for hip pain. NVID, cap refill< 2sec.

## 2017-11-11 NOTE — H&P ADULT - NSHPPHYSICALEXAM_GEN_ALL_CORE
Physical Exam: Vital Signs Last 24 Hrs  T(C): 36.3 (11 Nov 2017 06:41), Max: 36.6 (11 Nov 2017 02:20)  T(F): 97.4 (11 Nov 2017 06:41), Max: 97.9 (11 Nov 2017 02:20)  HR: 76 (11 Nov 2017 06:41) (72 - 76)  BP: 130/75 (11 Nov 2017 06:41) (120/58 - 130/75)  BP(mean): --  RR: 19 (11 Nov 2017 06:41) (16 - 19)  SpO2: 98% (11 Nov 2017 06:41) (98% - 100%)        HEENT: PRRL EOMI    MOUTH/TEETH/GUMS: Clear    NECK: no JVD    LUNGS: Clear    HEART: S1,S2 RR    ABDOMEN: soft nontender    EXTREMITIES: edema Yes: No:    MUSCULOSKELETAL: L hip tenderhess , scoliosis spine    NEURO: mild rigidity, no tremor,     SKIN: no rash

## 2017-11-11 NOTE — BRIEF OPERATIVE NOTE - POST-OP DX
Closed fracture of neck of left femur, initial encounter  11/11/2017  displaced  Active  Grayson Beckford

## 2017-11-11 NOTE — CONSULT NOTE ADULT - SUBJECTIVE AND OBJECTIVE BOX
77y Female, ambulates w/ walker at baseline, presents c/o L hip pain and inability to ambulate sp fall at home. Denies HS/LOC. Denies numbness/tingling. Denies fever/chills. Denies pain/injury elsewhere.     HEALTH ISSUES - PROBLEM Dx:    Neuropathy,  Scoliosis  Parkinson's    MEDICATIONS  (STANDING):    Allergies    No Known Allergies    Intolerances                              12.3   11.4  )-----------( 263      ( 11 Nov 2017 03:00 )             37.2     11 Nov 2017 03:00    141    |  106    |  29     ----------------------------<  96     3.3     |  30     |  1.15     Ca    8.9        11 Nov 2017 03:00      PT/INR - ( 11 Nov 2017 03:00 )   PT: 10.2 sec;   INR: 0.95 ratio         PTT - ( 11 Nov 2017 03:00 )  PTT:31.1 sec  Vital Signs Last 24 Hrs  T(C): 36.2 (11-11-17 @ 03:37), Max: 36.6 (11-11-17 @ 02:20)  T(F): 97.1 (11-11-17 @ 03:37), Max: 97.9 (11-11-17 @ 02:20)  HR: 72 (11-11-17 @ 03:37) (72 - 74)  BP: 127/78 (11-11-17 @ 03:37) (120/58 - 127/78)  BP(mean): --  RR: 17 (11-11-17 @ 03:37) (16 - 17)  SpO2: 100% (11-11-17 @ 03:37) (100% - 100%)    Imaging: XR demonstates L hip fracture    Physical Exam  Gen: NAD  L LE: skin intact, short/ER leg, unable to SLR L LE, + log roll R/L LE, +ttp hip/groin, no ttp elsewhere, +ehl/fhl/ta/gs function, no calf ttp, dp/pt pulse intact, compartments soft  Secondary survey: benign, nv intact, able to SLR contralateral leg, negative log roll contralateral leg, no bony ttp elsewhere    A/P: 77y Female with L hip fracture  Pain control  keep NPO  NWB L LE, bedrest  FU labs/imaging  Ca/Vit D  Outpt osteoporosis workup  medical mgmt  Medical clearance/optimization for OR  Will discuss with attending 77y Female, ambulates w/ walker at baseline, presents c/o L hip pain and inability to ambulate sp fall at home. Denies HS/LOC. Denies numbness/tingling. Denies fever/chills. Denies pain/injury elsewhere.     HEALTH ISSUES - PROBLEM Dx:    Neuropathy,  Scoliosis  Parkinson's    MEDICATIONS  (STANDING):    Allergies    No Known Allergies    Intolerances                              12.3   11.4  )-----------( 263      ( 11 Nov 2017 03:00 )             37.2     11 Nov 2017 03:00    141    |  106    |  29     ----------------------------<  96     3.3     |  30     |  1.15     Ca    8.9        11 Nov 2017 03:00      PT/INR - ( 11 Nov 2017 03:00 )   PT: 10.2 sec;   INR: 0.95 ratio         PTT - ( 11 Nov 2017 03:00 )  PTT:31.1 sec  Vital Signs Last 24 Hrs  T(C): 36.2 (11-11-17 @ 03:37), Max: 36.6 (11-11-17 @ 02:20)  T(F): 97.1 (11-11-17 @ 03:37), Max: 97.9 (11-11-17 @ 02:20)  HR: 72 (11-11-17 @ 03:37) (72 - 74)  BP: 127/78 (11-11-17 @ 03:37) (120/58 - 127/78)  BP(mean): --  RR: 17 (11-11-17 @ 03:37) (16 - 17)  SpO2: 100% (11-11-17 @ 03:37) (100% - 100%)    Imaging: XR demonstates L hip fracture    Physical Exam  Gen: NAD  L LE: skin intact, short/ER leg, unable to SLR L LE, + log roll R/L LE, +ttp hip/groin, no ttp elsewhere, +ehl/fhl/ta/gs function, no calf ttp, dp/pt pulse intact, compartments soft  Secondary survey: benign, nv intact, able to SLR contralateral leg, negative log roll contralateral leg, no bony ttp elsewhere    A/P: 77y Female with L hip fracture    OR today (11/11)  for L hip fracture  Pain control  keep NPO  hold chemical AC today  NWB L LE, bedrest  FU labs/imaging  Ca/Vit D  Outpt osteoporosis workup  medical mgmt  Medical clearance/optimization for OR  Will discuss with attending

## 2017-11-11 NOTE — BRIEF OPERATIVE NOTE - PROCEDURE
<<-----Click on this checkbox to enter Procedure Hemiarthroplasty hip partial  11/11/2017  left hip  Active  [id number]

## 2017-11-11 NOTE — H&P ADULT - NSHPREVIEWOFSYSTEMS_GEN_ALL_CORE
Review of Systems: she is c/o L hip pain , she was confused earlier from IV narcotic pain medications ROS is limited secondary to confusion, dementia    Eyes: no changes in vision    ENT/Mouth: no changes    Cardiovascular: no chest pain    Respiratory: no SOB    Gastrointestinal: no diarrhea, no nausea, no vomiting    Genitourinary: no dysuria    Breast: no pain    Musculoskeletal: L hip pain    Integumentary: no itching    Neurological: no HA, no seizure    Psychiatric: no suicidal ideations    Endocrine: no excessive thirst, polyuria, hot flashes    Hematologic/Lymphatic: no swollen glands    Allergic/Immunologic: no congestion, rash

## 2017-11-12 ENCOUNTER — TRANSCRIPTION ENCOUNTER (OUTPATIENT)
Age: 77
End: 2017-11-12

## 2017-11-12 LAB
ALBUMIN SERPL ELPH-MCNC: 2.9 G/DL — LOW (ref 3.3–5)
ALP SERPL-CCNC: 60 U/L — SIGNIFICANT CHANGE UP (ref 40–120)
ALT FLD-CCNC: 25 U/L — SIGNIFICANT CHANGE UP (ref 12–78)
ANION GAP SERPL CALC-SCNC: 5 MMOL/L — SIGNIFICANT CHANGE UP (ref 5–17)
AST SERPL-CCNC: 55 U/L — HIGH (ref 15–37)
BILIRUB SERPL-MCNC: 0.5 MG/DL — SIGNIFICANT CHANGE UP (ref 0.2–1.2)
BUN SERPL-MCNC: 24 MG/DL — HIGH (ref 7–23)
CALCIUM SERPL-MCNC: 8.1 MG/DL — LOW (ref 8.5–10.1)
CHLORIDE SERPL-SCNC: 109 MMOL/L — HIGH (ref 96–108)
CO2 SERPL-SCNC: 28 MMOL/L — SIGNIFICANT CHANGE UP (ref 22–31)
CREAT SERPL-MCNC: 0.81 MG/DL — SIGNIFICANT CHANGE UP (ref 0.5–1.3)
GLUCOSE SERPL-MCNC: 110 MG/DL — HIGH (ref 70–99)
HCT VFR BLD CALC: 29.8 % — LOW (ref 34.5–45)
HGB BLD-MCNC: 9.9 G/DL — LOW (ref 11.5–15.5)
MCHC RBC-ENTMCNC: 30.5 PG — SIGNIFICANT CHANGE UP (ref 27–34)
MCHC RBC-ENTMCNC: 33.3 GM/DL — SIGNIFICANT CHANGE UP (ref 32–36)
MCV RBC AUTO: 91.5 FL — SIGNIFICANT CHANGE UP (ref 80–100)
PLATELET # BLD AUTO: 216 K/UL — SIGNIFICANT CHANGE UP (ref 150–400)
POTASSIUM SERPL-MCNC: 3.5 MMOL/L — SIGNIFICANT CHANGE UP (ref 3.5–5.3)
POTASSIUM SERPL-SCNC: 3.5 MMOL/L — SIGNIFICANT CHANGE UP (ref 3.5–5.3)
PROT SERPL-MCNC: 5.8 GM/DL — LOW (ref 6–8.3)
RBC # BLD: 3.26 M/UL — LOW (ref 3.8–5.2)
RBC # FLD: 12.2 % — SIGNIFICANT CHANGE UP (ref 10.3–14.5)
SODIUM SERPL-SCNC: 142 MMOL/L — SIGNIFICANT CHANGE UP (ref 135–145)
WBC # BLD: 10.2 K/UL — SIGNIFICANT CHANGE UP (ref 3.8–10.5)
WBC # FLD AUTO: 10.2 K/UL — SIGNIFICANT CHANGE UP (ref 3.8–10.5)

## 2017-11-12 PROCEDURE — 73502 X-RAY EXAM HIP UNI 2-3 VIEWS: CPT | Mod: 26

## 2017-11-12 PROCEDURE — 99223 1ST HOSP IP/OBS HIGH 75: CPT

## 2017-11-12 RX ADMIN — HYDROMORPHONE HYDROCHLORIDE 0.5 MILLIGRAM(S): 2 INJECTION INTRAMUSCULAR; INTRAVENOUS; SUBCUTANEOUS at 21:30

## 2017-11-12 RX ADMIN — QUETIAPINE FUMARATE 25 MILLIGRAM(S): 200 TABLET, FILM COATED ORAL at 21:05

## 2017-11-12 RX ADMIN — GABAPENTIN 600 MILLIGRAM(S): 400 CAPSULE ORAL at 21:05

## 2017-11-12 RX ADMIN — HYDROMORPHONE HYDROCHLORIDE 0.5 MILLIGRAM(S): 2 INJECTION INTRAMUSCULAR; INTRAVENOUS; SUBCUTANEOUS at 12:44

## 2017-11-12 RX ADMIN — Medication 325 MILLIGRAM(S): at 17:21

## 2017-11-12 RX ADMIN — HYDROMORPHONE HYDROCHLORIDE 0.5 MILLIGRAM(S): 2 INJECTION INTRAMUSCULAR; INTRAVENOUS; SUBCUTANEOUS at 17:22

## 2017-11-12 RX ADMIN — HYDROMORPHONE HYDROCHLORIDE 0.5 MILLIGRAM(S): 2 INJECTION INTRAMUSCULAR; INTRAVENOUS; SUBCUTANEOUS at 06:47

## 2017-11-12 RX ADMIN — Medication 100 MILLIGRAM(S): at 21:05

## 2017-11-12 RX ADMIN — RASAGILINE 1 MILLIGRAM(S): 0.5 TABLET ORAL at 12:28

## 2017-11-12 RX ADMIN — HYDROMORPHONE HYDROCHLORIDE 0.5 MILLIGRAM(S): 2 INJECTION INTRAMUSCULAR; INTRAVENOUS; SUBCUTANEOUS at 00:19

## 2017-11-12 RX ADMIN — ENOXAPARIN SODIUM 40 MILLIGRAM(S): 100 INJECTION SUBCUTANEOUS at 06:08

## 2017-11-12 RX ADMIN — CARBIDOPA AND LEVODOPA 1.5 TABLET(S): 25; 100 TABLET ORAL at 12:28

## 2017-11-12 RX ADMIN — Medication 500 MILLIGRAM(S): at 06:02

## 2017-11-12 RX ADMIN — Medication 100 MILLIGRAM(S): at 05:56

## 2017-11-12 RX ADMIN — CARBIDOPA AND LEVODOPA 2 TABLET(S): 25; 100 TABLET ORAL at 22:12

## 2017-11-12 RX ADMIN — Medication 500 MILLIGRAM(S): at 17:20

## 2017-11-12 RX ADMIN — SENNA PLUS 2 TABLET(S): 8.6 TABLET ORAL at 21:05

## 2017-11-12 RX ADMIN — CARBIDOPA AND LEVODOPA 1.5 TABLET(S): 25; 100 TABLET ORAL at 14:32

## 2017-11-12 RX ADMIN — Medication 0.5 MILLIGRAM(S): at 21:05

## 2017-11-12 RX ADMIN — GABAPENTIN 600 MILLIGRAM(S): 400 CAPSULE ORAL at 05:58

## 2017-11-12 RX ADMIN — CARBIDOPA AND LEVODOPA 1.5 TABLET(S): 25; 100 TABLET ORAL at 17:21

## 2017-11-12 RX ADMIN — OXYCODONE HYDROCHLORIDE 10 MILLIGRAM(S): 5 TABLET ORAL at 23:17

## 2017-11-12 RX ADMIN — HYDROMORPHONE HYDROCHLORIDE 0.5 MILLIGRAM(S): 2 INJECTION INTRAMUSCULAR; INTRAVENOUS; SUBCUTANEOUS at 21:02

## 2017-11-12 RX ADMIN — CARBIDOPA AND LEVODOPA 2 TABLET(S): 25; 100 TABLET ORAL at 06:02

## 2017-11-12 NOTE — DISCHARGE NOTE ADULT - CARE PROVIDER_API CALL
Kehinde Khan (DO), Orthopedics  [address] [place] [address]  Phone: [phone]  Fax: [phone]

## 2017-11-12 NOTE — DISCHARGE NOTE ADULT - PATIENT PORTAL LINK FT
“You can access the Follow[employer] Patient Portal, offered by [facility], by registering with the following website: [email or web address]”

## 2017-11-12 NOTE — PHYSICAL THERAPY INITIAL EVALUATION ADULT - ACTIVE RANGE OF MOTION EXAMINATION, REHAB EVAL
Limited active ROM of LLE due to pain./deficits as listed below/Right LE Active ROM was WFL (within functional limits)/roderick. upper extremity Active ROM was WNL (within normal limits)

## 2017-11-12 NOTE — DISCHARGE NOTE ADULT - PLAN OF CARE
Return to Baseline ADLs CRPP DC Instructions:    1.	Resume previous diet, regular or diabetic as appropriate  2.	Weight Bearing as Tolerated with assistance and rolling walker  3.	Continue DVT/PE Prophylaxis. Lovenox SC. See Med Rec for Duration and dose.  4.	PT daily  5.	Follow up with Orthopedic Surgeon Dr. Khan in 14 days. Call Office asap For Appointment.  6.	Staples to be removed by RN Post-Op Day 14 (11/25/17), provided wound is healed, no open areas or copious drainage.  7.	Ice the hip as much as possible  8.	Keep Aquacel bandage on Hip. Change only if wet or soiled using Tegaderm/Paper tape and dry gauze.  9.     OK to shower with Aquacel beige bandage, otherwise sponge bathe only. Will need assistance to shower. Hip Hemiarthroplasty DC Instructions:    1.	Resume previous diet, regular or diabetic as appropriate  2.	Weight Bearing as Tolerated with posterior hip precautions and assistance and rolling walker  3.	Continue DVT/PE Prophylaxis. Lovenox SC. See Med Rec for Duration and dose.  4.	PT daily  5.	Follow up with Orthopedic Surgeon Dr. Khan in 14 days. Call Office asap For Appointment.  6.	Staples to be removed by RN Post-Op Day 14 (11/25/17), provided wound is healed, no open areas or copious drainage.  7.	Ice the hip as much as possible  8.	Keep Aquacel bandage on Hip. Change only if wet or soiled using Tegaderm/Paper tape and dry gauze.  9.     OK to shower with Aquacel beige bandage, otherwise sponge bathe only. Will need assistance to shower. Left Hip Hemiarthroplasty DC Instructions:    1.	Resume previous diet, regular or diabetic as appropriate  2.	Weight Bearing as Tolerated with posterior hip precautions and assistance and rolling walker  3.	Continue DVT/PE Prophylaxis. Lovenox SC. See Med Rec for Duration and dose.  4.	PT daily  5.	Follow up with Orthopedic Surgeon Dr. Khan in 14 days. Call Office asap For Appointment.  6.	Staples to be removed by RN Post-Op Day 14 (11/25/17), provided wound is healed, no open areas or copious drainage.  7.	Ice the hip as much as possible  8.	Keep Aquacel bandage on Hip. Change only if wet or soiled using Tegaderm/Paper tape and dry gauze.  9.     OK to shower with Aquacel beige bandage, otherwise sponge bathe only. Will need assistance to shower.

## 2017-11-12 NOTE — CONSULT NOTE ADULT - ASSESSMENT
This is a 77 year old female s/p left femur fx now s/p left hip jordana on 11-11-17.  Pt has high thrombosis risk requires anticoagulation prophylaxis.    :Lovenox 40mg sq daily for four weeks post procedure  :daily cbc/bmp  :LE Venodynes  : increase mobility as tolerated  :Thanks for consult will f/u

## 2017-11-12 NOTE — DISCHARGE NOTE ADULT - MEDICATION SUMMARY - MEDICATIONS TO STOP TAKING
I will STOP taking the medications listed below when I get home from the hospital:    ibuprofen 800 mg oral tablet  -- orally every 6 hours, As Needed

## 2017-11-12 NOTE — PHYSICAL THERAPY INITIAL EVALUATION ADULT - GENERAL OBSERVATIONS, REHAB EVAL
Pt. received in bed on 2N w/ hip abduction in place, trunk laterally flexed to right side. Able to assist w/ alignment in bed.

## 2017-11-12 NOTE — DISCHARGE NOTE ADULT - CARE PLAN
Principal Discharge DX:	Hip fracture  Goal:	Return to Baseline ADLs  Instructions for follow-up, activity and diet:	CRPP DC Instructions:    1.	Resume previous diet, regular or diabetic as appropriate  2.	Weight Bearing as Tolerated with assistance and rolling walker  3.	Continue DVT/PE Prophylaxis. Lovenox SC. See Med Rec for Duration and dose.  4.	PT daily  5.	Follow up with Orthopedic Surgeon Dr. Khan in 14 days. Call Office asap For Appointment.  6.	Staples to be removed by RN Post-Op Day 14 (11/25/17), provided wound is healed, no open areas or copious drainage.  7.	Ice the hip as much as possible  8.	Keep Aquacel bandage on Hip. Change only if wet or soiled using Tegaderm/Paper tape and dry gauze.  9.     OK to shower with Aquacel beige bandage, otherwise sponge bathe only. Will need assistance to shower. Principal Discharge DX:	Hip fracture  Goal:	Return to Baseline ADLs  Instructions for follow-up, activity and diet:	Hip Hemiarthroplasty DC Instructions:    1.	Resume previous diet, regular or diabetic as appropriate  2.	Weight Bearing as Tolerated with posterior hip precautions and assistance and rolling walker  3.	Continue DVT/PE Prophylaxis. Lovenox SC. See Med Rec for Duration and dose.  4.	PT daily  5.	Follow up with Orthopedic Surgeon Dr. Khan in 14 days. Call Office asap For Appointment.  6.	Staples to be removed by RN Post-Op Day 14 (11/25/17), provided wound is healed, no open areas or copious drainage.  7.	Ice the hip as much as possible  8.	Keep Aquacel bandage on Hip. Change only if wet or soiled using Tegaderm/Paper tape and dry gauze.  9.     OK to shower with Aquacel beige bandage, otherwise sponge bathe only. Will need assistance to shower. Principal Discharge DX:	Hip fracture  Goal:	Return to Baseline ADLs  Instructions for follow-up, activity and diet:	Left Hip Hemiarthroplasty DC Instructions:    1.	Resume previous diet, regular or diabetic as appropriate  2.	Weight Bearing as Tolerated with posterior hip precautions and assistance and rolling walker  3.	Continue DVT/PE Prophylaxis. Lovenox SC. See Med Rec for Duration and dose.  4.	PT daily  5.	Follow up with Orthopedic Surgeon Dr. Khan in 14 days. Call Office asap For Appointment.  6.	Staples to be removed by RN Post-Op Day 14 (11/25/17), provided wound is healed, no open areas or copious drainage.  7.	Ice the hip as much as possible  8.	Keep Aquacel bandage on Hip. Change only if wet or soiled using Tegaderm/Paper tape and dry gauze.  9.     OK to shower with Aquacel beige bandage, otherwise sponge bathe only. Will need assistance to shower.

## 2017-11-12 NOTE — CONSULT NOTE ADULT - SUBJECTIVE AND OBJECTIVE BOX
HPI:  The patient is a 78 yo femal with Hx. of Parkinson's, lumbar spinal stenosis/ scoliosis, Cr. low back pain, neuropathy, HTN, mild dementia, UTI, who fell last night in the [facility] living facility where she resides  was c/o of L hip pain. She was diagnosed with L fem. neck fracture and  referred for admission and surgery. (2017 08:50)      Patient is a 77y old  Female who presents with a chief complaint of Left hip pain after she fell in the assisted living facility (2017 06:59)  Pt s/p left Hip [place] on 17.    Consulted by Dr. Kehinde Khan  for VTE prophylaxis, risk stratification, and anticoagulation management.    PAST MEDICAL & SURGICAL HISTORY:  Scoliosis  Neuropathy  Parkinson's disease  No significant past surgical history    Caprini VTE Risk Score:8    IMPROVE Bleeding Risk Score: 1.5    Falls Risk:   High (  )  Mod (  )  Low (  )    EBL:  150 ml  crcl: 72.04  17 pt seen in hallway near [facility] station on 2n, daughter present.  Pt confused knows her name,  Discussed with daughter anticoagulation with lovenox for four weeks post procedure  Questions answered will reinforce as needed.  Vital Signs Last 24 Hrs  T(C): 37.3 (2017 11:04), Max: 37.4 (2017 18:21)  T(F): 99.1 (2017 11:04), Max: 99.4 (2017 18:21)  HR: 93 (2017 11:04) (73 - 105)  BP: 136/47 (2017 11:04) (108/50 - 175/77)  BP(mean): --  RR: 16 (2017 11:04) (9 - 18)  SpO2: 98% (2017 11:04) (95% - 99%)  FAMILY HISTORY:  Family history of age of death (Mother): mother  of old age  Family history of Parkinson disease (Father)    Denies any personal or familial history of clotting or bleeding disorders.    Allergies    No Known Allergies    Intolerances        REVIEW OF SYSTEMS    (  )Fever	     (  )Constipation	(  )SOB				(  )Headache	(  )Dysuria  (  )Chills	     (  )Melena	(  )Dyspnea present on exertion	                    (  )Dizziness                    (  )Polyuria  (  )Nausea	     (  )Hematochezia	(  )Cough			                    (  )Syncope   	(  )Hematuria  (  )Vomiting    (  )Chest Pain	(  )Wheezing			(  )Weakness  (  )Diarrhea     (  )Palpitations	(  )Anorexia			(  )Myalgia    Unable to obtain review of systems due to: confusion      PHYSICAL EXAM:    Constitutional: Appears Well    Neurological: A& O x 1 person    Skin: Warm    Respiratory and Thorax: normal effort; Breath sounds: normal; No rales/wheezing/rhonchi  	  Cardiovascular: S1, S2, regular, NMBR	    Gastrointestinal: BS + x 4Q, nontender	    Genitourinary:  Bladder nondistended, nontender    Musculoskeletal:   General Right:   no muscle/joint tenderness,   normal tone, no joint swelling,   ROM:full	    General Left:   no muscle/joint tenderness,   normal tone, no joint swelling,   ROM: limited    Hip:   Left: Dressing CDI;   Lower extrems:   Right: no calf tenderness              negative nubia's sign               + pedal pulses    Left:   no calf tenderness              negative nubia's sign               + pedal pulses                          9.9    10.2  )-----------( 216      ( 2017 05:33 )             29.8       11-12    142  |  109<H>  |  24<H>  ----------------------------<  110<H>  3.5   |  28  |  0.81    Ca    8.1<L>      2017 05:33    TPro  5.8<L>  /  Alb  2.9<L>  /  TBili  0.5  /  DBili  x   /  AST  55<H>  /  ALT  25  /  AlkPhos  60  11-12      PT/INR - ( 2017 03:00 )   PT: 10.2 sec;   INR: 0.95 ratio         PTT - ( 2017 03:00 )  PTT:31.1 sec				    MEDICATIONS  (STANDING):  ascorbic acid 500 milliGRAM(s) Oral two times a day  calcium carbonate 1250 mG (OsCal) 1 Tablet(s) Oral daily  carbidopa/levodopa  25/100 2 Tablet(s) Oral <User Schedule>  carbidopa/levodopa  25/100 1.5 Tablet(s) Oral <User Schedule>  carbidopa/levodopa CR 25/100 2 Tablet(s) Oral at bedtime  cholecalciferol 1000 Unit(s) Oral daily  clonazePAM Tablet 0.5 milliGRAM(s) Oral at bedtime  docusate sodium 100 milliGRAM(s) Oral two times a day  docusate sodium 100 milliGRAM(s) Oral three times a day  DULoxetine 30 milliGRAM(s) Oral daily  enoxaparin Injectable 40 milliGRAM(s) SubCutaneous every 24 hours  ferrous    sulfate 325 milliGRAM(s) Oral three times a day with meals  folic acid 1 milliGRAM(s) Oral daily  gabapentin 600 milliGRAM(s) Oral three times a day  lactated ringers. 1000 milliLiter(s) IV Continuous <Continuous>  multivitamin 1 Tablet(s) Oral daily  pantoprazole    Tablet 40 milliGRAM(s) Oral daily  polyethylene glycol 3350 17 Gram(s) Oral daily  QUEtiapine 25 milliGRAM(s) Oral at bedtime  rasagiline Tablet 1 milliGRAM(s) Oral daily  senna 2 Tablet(s) Oral at bedtime          DVT Prophylaxis:  LMWH                   ( x )  Heparin SQ           (  )  Coumadin             (  )  Xarelto                  (  )  Eliquis                   (  )  Venodynes           ( x )  Ambulation          ( x )  UFH                       (  )  Contraindicated  (  )

## 2017-11-12 NOTE — PHYSICAL THERAPY INITIAL EVALUATION ADULT - PERTINENT HX OF CURRENT PROBLEM, REHAB EVAL
Pt. presents to  s/p fall at [facility], c/o left hip pain.

## 2017-11-12 NOTE — DISCHARGE NOTE ADULT - HOSPITAL COURSE
Orthopedic Summary  H&P:  Pt is a 77y Female PAST MEDICAL & SURGICAL HISTORY:  Scoliosis  Neuropathy  Parkinson's disease  No significant past surgical history    POD 3 s/p Hip CRPP  for fracture. Pt is afebrile with stable vital signs. Pain is controlled. Exam reveals intact EHL FHL TA GS, +DP. Dressing is clean and dry with a New Aquacel bandage on.  Vital Signs Last 24 Hrs  T(C): 37.4 (11-11-17 @ 23:10), Max: 37.4 (11-11-17 @ 10:49)  T(F): 99.4 (11-11-17 @ 23:10), Max: 99.4 (11-11-17 @ 10:49)  HR: 89 (11-11-17 @ 23:10) (73 - 110)  BP: 113/57 (11-11-17 @ 23:10) (108/50 - 178/75)  BP(mean): --  RR: 16 (11-11-17 @ 23:10) (9 - 18)  SpO2: 96% (11-11-17 @ 23:10) (95% - 99%)                        9.9    10.2  )-----------( 216      ( 12 Nov 2017 05:33 )             29.8     PT/INR - ( 11 Nov 2017 03:00 )   PT: 10.2 sec;   INR: 0.95 ratio         PTT - ( 11 Nov 2017 03:00 )  PTT:31.1 sec    Hospital Course:  Patient presented to [facility] ED after a fall, found to have a femoral neck/hip fracture. Pt was  medically/cardiac cleared prior to the surgical procedure. Prophylactic antibiotics were started before the procedure and continued for 24 hours. They were admitted after surgery to the orthopedic floor. There were no complications during the hospital stay. All home medications were continued.    Routine consults were obtained from the Anticoagulation Team for DVT/PE prophylaxis, from Physical Therapy for twice daily PT starting on POD 0, and pt was followed by Medicine for Medical Co-management. Patient was placed on lovenox for anticoagulation.  Pertinent home medications were continued.  Daily labs were followed.      On POD 0 or 1 the pt was OOB with PT and there were no overnight events. POD1 PT was started, and on POD 2 a new Aquacel dressing was applied. Orthopedically, the pt is ready today for DC to Rehab for ongoing PT once evaluated and ok per Medicine.  The orthopedic Attending is aware and agrees. See addendum to DC summary per medical team below for any additional info or if any changes. Orthopedic Summary  H&P:  Pt is a 77y Female PAST MEDICAL & SURGICAL HISTORY:  Scoliosis  Neuropathy  Parkinson's disease  No significant past surgical history    POD 3 s/p Hip hemiarthroplasty for fracture. Pt is afebrile with stable vital signs. Pain is controlled. Exam reveals intact EHL FHL TA GS, +DP. Dressing is clean and dry with a New Aquacel bandage on.  Vital Signs Last 24 Hrs  T(C): 37.4 (11-11-17 @ 23:10), Max: 37.4 (11-11-17 @ 10:49)  T(F): 99.4 (11-11-17 @ 23:10), Max: 99.4 (11-11-17 @ 10:49)  HR: 89 (11-11-17 @ 23:10) (73 - 110)  BP: 113/57 (11-11-17 @ 23:10) (108/50 - 178/75)  BP(mean): --  RR: 16 (11-11-17 @ 23:10) (9 - 18)  SpO2: 96% (11-11-17 @ 23:10) (95% - 99%)                        9.9    10.2  )-----------( 216      ( 12 Nov 2017 05:33 )             29.8     PT/INR - ( 11 Nov 2017 03:00 )   PT: 10.2 sec;   INR: 0.95 ratio         PTT - ( 11 Nov 2017 03:00 )  PTT:31.1 sec    Hospital Course:  Patient presented to [facility] ED after a fall, found to have a femoral neck/hip fracture. Pt was  medically/cardiac cleared prior to the surgical procedure. Prophylactic antibiotics were started before the procedure and continued for 24 hours. They were admitted after surgery to the orthopedic floor. There were no complications during the hospital stay. All home medications were continued.    Routine consults were obtained from the Anticoagulation Team for DVT/PE prophylaxis, from Physical Therapy for twice daily PT starting on POD 0, and pt was followed by Medicine for Medical Co-management. Patient was placed on lovenox for anticoagulation.  Pertinent home medications were continued.  Daily labs were followed.      On POD 0 or 1 the pt was OOB with PT and there were no overnight events. POD1 PT was started, and on POD 2 a new Aquacel dressing was applied. Orthopedically, the pt is ready today for DC to Rehab for ongoing PT once evaluated and ok per Medicine.  The orthopedic Attending is aware and agrees. See addendum to DC summary per medical team below for any additional info or if any changes. Orthopedic Summary  H&P:  Pt is a 77y Female PAST MEDICAL & SURGICAL HISTORY:  Scoliosis  Neuropathy  Parkinson's disease  No significant past surgical history    POD 3 s/p Hip hemiarthroplasty for fracture. Pt is afebrile with stable vital signs. Pain is controlled. Exam reveals intact EHL FHL TA GS, +DP. Dressing is clean and dry with a New Aquacel bandage on.  Vital Signs Last 24 Hrs  T(C): 37.4 (11-11-17 @ 23:10), Max: 37.4 (11-11-17 @ 10:49)  T(F): 99.4 (11-11-17 @ 23:10), Max: 99.4 (11-11-17 @ 10:49)  HR: 89 (11-11-17 @ 23:10) (73 - 110)  BP: 113/57 (11-11-17 @ 23:10) (108/50 - 178/75)  RR: 16 (11-11-17 @ 23:10) (9 - 18)  SpO2: 96% (11-11-17 @ 23:10) (95% - 99%)                        9.9    10.2  )-----------( 216      ( 12 Nov 2017 05:33 )             29.8     PT/INR - ( 11 Nov 2017 03:00 )   PT: 10.2 sec;   INR: 0.95 ratio         PTT - ( 11 Nov 2017 03:00 )  PTT:31.1 sec    Hospital Course:  Patient presented to [facility] ED after a fall, found to have a femoral neck/hip fracture. Pt was  medically/cardiac cleared prior to the surgical procedure. Prophylactic antibiotics were started before the procedure and continued for 24 hours. They were admitted after surgery to the orthopedic floor. There were no complications during the hospital stay. All home medications were continued.    Routine consults were obtained from the Anticoagulation Team for DVT/PE prophylaxis, from Physical Therapy for twice daily PT starting on POD 0, and pt was followed by Medicine for Medical Co-management. Patient was placed on lovenox for anticoagulation.  Pertinent home medications were continued.  Daily labs were followed.      On POD 0 or 1 the pt was OOB with PT and there were no overnight events. POD1 PT was started, and on POD 2 a new Aquacel dressing was applied. Orthopedically, the pt is ready today for DC to Rehab for ongoing PT once evaluated and ok per Medicine.  The orthopedic Attending is aware and agrees. See addendum to DC summary per medical team below for any additional info or if any changes.    HOSPITALIST ADDENDUM:   76 yo female with Hx. of Parkinson's, lumbar spinal stenosis/ scoliosis, Cr. low back pain, neuropathy, HTN, mild dementia, UTI, who fell at the [facility] where she resides and  was c/o of L hip pain. She was diagnosed with L fem. neck fracture and  referred for admission and surgery. She underwent left hip hemiarthroplasty on nov 11th. Post op course complicated by confusion which has since improved. She will be discharged to rehab today.    For physical exam please see progress note from 11/13    FINAL DIAGNOSIS:    #S/p mechanical fall with LT hip fracture s/p LT hemiarthroplasty  #Acute blood loss anemia  #Confusion likely delerium due to anesthesia/postop state/narcotics  #Parkinson's      time taken in preparation for dc 65 minutes Orthopedic Summary  H&P:  Pt is a 77y Female PAST MEDICAL & SURGICAL HISTORY:  Scoliosis  Neuropathy  Parkinson's disease  No significant past surgical history  Now s/ p Left Hip hemiarthroplasty for fracture on 11/11/17. Pt is afebrile with stable vital signs. Pain is controlled. Exam reveals intact EHL FHL TA GS, +DP. Dressing is clean and dry with a New Aquacel bandage on.    Vital Signs Last 24 Hrs  T(C): 36.9 (13 Nov 2017 11:11), Max: 37.5 (12 Nov 2017 17:23)  T(F): 98.5 (13 Nov 2017 11:11), Max: 99.5 (12 Nov 2017 17:23)  HR: 99 (13 Nov 2017 11:11) (90 - 100)  BP: 111/46 (13 Nov 2017 11:11) (111/46 - 146/-)  BP(mean): 60 (13 Nov 2017 11:11) (60 - 69)  RR: 16 (13 Nov 2017 11:11) (16 - 18)  SpO2: 99% (13 Nov 2017 11:11) (96% - 99%)                          9.5    12.1  )-----------( 190      ( 13 Nov 2017 06:02 )             28.4       Hospital Course:  Patient presented to [facility] ED after a fall, found to have a left femoral neck hip fracture. Pt was medically/cardiac cleared prior to the surgical procedure. Prophylactic antibiotics were started before the procedure and continued for 24 hours. They were admitted after surgery to the orthopedic floor. There were no complications during the hospital stay. All home medications were continued.    Routine consults were obtained from the Anticoagulation Team for DVT/PE prophylaxis, from Physical Therapy for twice daily PT starting on POD 0, and pt was followed by Medicine for Medical Co-management. Patient was placed on lovenox for anticoagulation.  Pertinent home medications were continued.  Daily labs were followed.      On POD 0 or 1 the pt was OOB with PT and there were no overnight events. POD1 PT was started, and on POD 2 a new Aquacel dressing was applied. Orthopedically, the pt is ready today for DC to Rehab for ongoing PT once evaluated and ok per Medicine.  The orthopedic Attending is aware and agrees. See addendum to DC summary per medical team below for any additional info or if any changes.    HOSPITALIST ADDENDUM:   78 yo female with Hx. of Parkinson's, lumbar spinal stenosis/ scoliosis, Cr. low back pain, neuropathy, HTN, mild dementia, UTI, who fell at the [facility] where she resides and  was c/o of L hip pain. She was diagnosed with L fem. neck fracture and  referred for admission and surgery. She underwent left hip hemiarthroplasty on nov 11th. Post op course complicated by confusion which has since improved. She will be discharged to rehab today.    For physical exam please see progress note from 11/13    FINAL DIAGNOSIS:    #S/p mechanical fall with LT hip fracture s/p LT hemiarthroplasty  #Acute blood loss anemia  #Confusion likely delerium due to anesthesia/postop state/narcotics  #Parkinson's      time taken in preparation for dc 65 minutes

## 2017-11-12 NOTE — DISCHARGE NOTE ADULT - INSTRUCTIONS
Monitor for signs of infection such as new onset pain, reddness, swelling, smell or drainage at the incisional site or a temperature > 101 degrees F. turn & reposition within the bed & chair to prevent skin breakdown & promote skin health.  Never get out of bad/chair alone at rehab, always call for assistance first for safety.

## 2017-11-12 NOTE — PHYSICAL THERAPY INITIAL EVALUATION ADULT - LEVEL OF INDEPENDENCE: BED TO CHAIR, REHAB EVAL
unable to perform/due to pt. not following instructions upon sitting on EOB. Pt. reaching forward stating pt. wanted grapes.

## 2017-11-13 VITALS
RESPIRATION RATE: 16 BRPM | DIASTOLIC BLOOD PRESSURE: 55 MMHG | TEMPERATURE: 100 F | OXYGEN SATURATION: 98 % | HEART RATE: 99 BPM | SYSTOLIC BLOOD PRESSURE: 118 MMHG

## 2017-11-13 LAB
ANION GAP SERPL CALC-SCNC: 4 MMOL/L — LOW (ref 5–17)
BUN SERPL-MCNC: 24 MG/DL — HIGH (ref 7–23)
CALCIUM SERPL-MCNC: 8.4 MG/DL — LOW (ref 8.5–10.1)
CHLORIDE SERPL-SCNC: 105 MMOL/L — SIGNIFICANT CHANGE UP (ref 96–108)
CO2 SERPL-SCNC: 29 MMOL/L — SIGNIFICANT CHANGE UP (ref 22–31)
CREAT SERPL-MCNC: 0.61 MG/DL — SIGNIFICANT CHANGE UP (ref 0.5–1.3)
GLUCOSE SERPL-MCNC: 99 MG/DL — SIGNIFICANT CHANGE UP (ref 70–99)
HCT VFR BLD CALC: 28.4 % — LOW (ref 34.5–45)
HGB BLD-MCNC: 9.5 G/DL — LOW (ref 11.5–15.5)
MCHC RBC-ENTMCNC: 30.4 PG — SIGNIFICANT CHANGE UP (ref 27–34)
MCHC RBC-ENTMCNC: 33.4 GM/DL — SIGNIFICANT CHANGE UP (ref 32–36)
MCV RBC AUTO: 91.2 FL — SIGNIFICANT CHANGE UP (ref 80–100)
PLATELET # BLD AUTO: 190 K/UL — SIGNIFICANT CHANGE UP (ref 150–400)
POTASSIUM SERPL-MCNC: 3.2 MMOL/L — LOW (ref 3.5–5.3)
POTASSIUM SERPL-SCNC: 3.2 MMOL/L — LOW (ref 3.5–5.3)
RBC # BLD: 3.12 M/UL — LOW (ref 3.8–5.2)
RBC # FLD: 12 % — SIGNIFICANT CHANGE UP (ref 10.3–14.5)
SODIUM SERPL-SCNC: 138 MMOL/L — SIGNIFICANT CHANGE UP (ref 135–145)
WBC # BLD: 12.1 K/UL — HIGH (ref 3.8–10.5)
WBC # FLD AUTO: 12.1 K/UL — HIGH (ref 3.8–10.5)

## 2017-11-13 PROCEDURE — 73502 X-RAY EXAM HIP UNI 2-3 VIEWS: CPT | Mod: 26

## 2017-11-13 PROCEDURE — 99233 SBSQ HOSP IP/OBS HIGH 50: CPT

## 2017-11-13 RX ORDER — DOCUSATE SODIUM 100 MG
1 CAPSULE ORAL
Qty: 0 | Refills: 0 | COMMUNITY
Start: 2017-11-13

## 2017-11-13 RX ORDER — CARBIDOPA AND LEVODOPA 25; 100 MG/1; MG/1
1.5 TABLET ORAL
Qty: 0 | Refills: 0 | COMMUNITY
Start: 2017-11-13

## 2017-11-13 RX ORDER — CARBIDOPA AND LEVODOPA 25; 100 MG/1; MG/1
1 TABLET ORAL
Qty: 0 | Refills: 0 | COMMUNITY
Start: 2017-11-13

## 2017-11-13 RX ORDER — DOCUSATE SODIUM 100 MG
1 CAPSULE ORAL
Qty: 0 | Refills: 0 | COMMUNITY

## 2017-11-13 RX ORDER — POTASSIUM CHLORIDE 20 MEQ
40 PACKET (EA) ORAL ONCE
Qty: 0 | Refills: 0 | Status: COMPLETED | OUTPATIENT
Start: 2017-11-13 | End: 2017-11-13

## 2017-11-13 RX ORDER — ASCORBIC ACID 60 MG
1 TABLET,CHEWABLE ORAL
Qty: 0 | Refills: 0 | COMMUNITY
Start: 2017-11-13

## 2017-11-13 RX ORDER — DULOXETINE HYDROCHLORIDE 30 MG/1
1 CAPSULE, DELAYED RELEASE ORAL
Qty: 0 | Refills: 0 | COMMUNITY
Start: 2017-11-13

## 2017-11-13 RX ORDER — SENNA PLUS 8.6 MG/1
0 TABLET ORAL
Qty: 0 | Refills: 0 | COMMUNITY

## 2017-11-13 RX ORDER — CARBIDOPA AND LEVODOPA 25; 100 MG/1; MG/1
2 TABLET ORAL
Qty: 0 | Refills: 0 | COMMUNITY
Start: 2017-11-13

## 2017-11-13 RX ORDER — IBUPROFEN 200 MG
0 TABLET ORAL
Qty: 0 | Refills: 0 | COMMUNITY

## 2017-11-13 RX ORDER — DULOXETINE HYDROCHLORIDE 30 MG/1
1 CAPSULE, DELAYED RELEASE ORAL
Qty: 0 | Refills: 0 | COMMUNITY

## 2017-11-13 RX ORDER — CARBIDOPA AND LEVODOPA 25; 100 MG/1; MG/1
1.5 TABLET ORAL
Qty: 0 | Refills: 0 | COMMUNITY

## 2017-11-13 RX ORDER — TRAMADOL HYDROCHLORIDE 50 MG/1
0 TABLET ORAL
Qty: 0 | Refills: 0 | COMMUNITY

## 2017-11-13 RX ORDER — CARBIDOPA AND LEVODOPA 25; 100 MG/1; MG/1
2 TABLET ORAL
Qty: 0 | Refills: 0 | COMMUNITY

## 2017-11-13 RX ORDER — ENOXAPARIN SODIUM 100 MG/ML
40 INJECTION SUBCUTANEOUS
Qty: 0 | Refills: 0 | COMMUNITY
Start: 2017-11-13

## 2017-11-13 RX ORDER — ACETAMINOPHEN 500 MG
2 TABLET ORAL
Qty: 0 | Refills: 0 | COMMUNITY
Start: 2017-11-13

## 2017-11-13 RX ORDER — CARBIDOPA AND LEVODOPA 25; 100 MG/1; MG/1
0 TABLET ORAL
Qty: 0 | Refills: 0 | COMMUNITY

## 2017-11-13 RX ORDER — SENNA PLUS 8.6 MG/1
2 TABLET ORAL
Qty: 0 | Refills: 0 | COMMUNITY
Start: 2017-11-13

## 2017-11-13 RX ORDER — OXYCODONE HYDROCHLORIDE 5 MG/1
1 TABLET ORAL
Qty: 0 | Refills: 0 | COMMUNITY
Start: 2017-11-13

## 2017-11-13 RX ADMIN — CARBIDOPA AND LEVODOPA 1.5 TABLET(S): 25; 100 TABLET ORAL at 14:15

## 2017-11-13 RX ADMIN — DULOXETINE HYDROCHLORIDE 30 MILLIGRAM(S): 30 CAPSULE, DELAYED RELEASE ORAL at 11:53

## 2017-11-13 RX ADMIN — Medication 325 MILLIGRAM(S): at 16:45

## 2017-11-13 RX ADMIN — Medication 325 MILLIGRAM(S): at 11:52

## 2017-11-13 RX ADMIN — Medication 1 TABLET(S): at 11:54

## 2017-11-13 RX ADMIN — GABAPENTIN 600 MILLIGRAM(S): 400 CAPSULE ORAL at 06:01

## 2017-11-13 RX ADMIN — RASAGILINE 1 MILLIGRAM(S): 0.5 TABLET ORAL at 11:53

## 2017-11-13 RX ADMIN — Medication 500 MILLIGRAM(S): at 06:01

## 2017-11-13 RX ADMIN — ENOXAPARIN SODIUM 40 MILLIGRAM(S): 100 INJECTION SUBCUTANEOUS at 06:01

## 2017-11-13 RX ADMIN — Medication 325 MILLIGRAM(S): at 08:37

## 2017-11-13 RX ADMIN — Medication 1 TABLET(S): at 11:53

## 2017-11-13 RX ADMIN — Medication 1 MILLIGRAM(S): at 11:53

## 2017-11-13 RX ADMIN — Medication 100 MILLIGRAM(S): at 14:16

## 2017-11-13 RX ADMIN — Medication 1000 UNIT(S): at 11:53

## 2017-11-13 RX ADMIN — CARBIDOPA AND LEVODOPA 1.5 TABLET(S): 25; 100 TABLET ORAL at 11:53

## 2017-11-13 RX ADMIN — GABAPENTIN 600 MILLIGRAM(S): 400 CAPSULE ORAL at 14:15

## 2017-11-13 RX ADMIN — POLYETHYLENE GLYCOL 3350 17 GRAM(S): 17 POWDER, FOR SOLUTION ORAL at 11:52

## 2017-11-13 RX ADMIN — Medication 40 MILLIEQUIVALENT(S): at 14:15

## 2017-11-13 RX ADMIN — CARBIDOPA AND LEVODOPA 2 TABLET(S): 25; 100 TABLET ORAL at 06:01

## 2017-11-13 RX ADMIN — Medication 100 MILLIGRAM(S): at 06:01

## 2017-11-13 RX ADMIN — PANTOPRAZOLE SODIUM 40 MILLIGRAM(S): 20 TABLET, DELAYED RELEASE ORAL at 11:53

## 2017-11-13 RX ADMIN — OXYCODONE HYDROCHLORIDE 10 MILLIGRAM(S): 5 TABLET ORAL at 00:04

## 2017-11-13 RX ADMIN — CARBIDOPA AND LEVODOPA 1.5 TABLET(S): 25; 100 TABLET ORAL at 16:45

## 2017-11-13 NOTE — PROGRESS NOTE ADULT - PROVIDER SPECIALTY LIST ADULT
Anticoag Management
Hospitalist
Orthopedics
Hospitalist
Orthopedics

## 2017-11-13 NOTE — PROGRESS NOTE ADULT - ASSESSMENT
A/P: 77y Female with L hip fracture, FN displaced, intracapsular    OR today (11/11)  for L hip fracture  Pain control  keep NPO  hold chemical AC today  NWB L LE, bedrest  FU labs/imaging  Ca/Vit D  Outpt osteoporosis workup  medical mgmt  Medical clearance/optimization for OR - appreciated  All RBA discussed with patient and family which include but are not limited to bleeding, infection, nerve/vascular damage, DVT, PE, malunion, nonunion, ezekiel-implant fracture, leg length inequality, need for further surgery, dislocation, loss of limb, loss of life, and the risks associated with general anesthesia.  All questions answered.  Decision to proceed with surgery  Informed consent completed.
77 year old female status post Left Hip Hemiarthroplasty POD# 0    -Analgesia  -DVT Prophylaxis  -Weight bearing as tolerated  -Posterior Hip Precautions  -Abduction Pillow  -Physical Therapy  -Follow up labs  -Discharge planning
77F s/p L hip hemiarthroplasty POD 1  Analgesia  DVT ppx  WBAT with posterior hip precautions  PT with posterior hip precautions  Incentive spirometry  DC planning
77F s/p L hip hemiarthroplasty POD 1  Analgesia  DVT ppx  WBAT with posterior hip precautions  PT with posterior hip precautions  Incentive spirometry  DC planning   Caution with postop pain meds  Postop confusion  Must be cautious with posteriro hip precautions and monitor for signs of dislocation - will obtain Xray today and prior to DC to ensure no dislocation.  Currently legs are clinically equal in length.    Will follow.
This is a 77 year old female s/p left femur fx now s/p left hip jordana on 11-11-17.  Pt has high thrombosis risk requires anticoagulation prophylaxis.    :Lovenox 40mg sq daily for four weeks post procedure  :daily cbc/bmp  :LE Venodynes  : increase mobility as tolerated  :t will f/u

## 2017-11-13 NOTE — PROGRESS NOTE ADULT - SUBJECTIVE AND OBJECTIVE BOX
Orthopedic Attending Note    Consultation called for definitive treatment of the patient's left hip femoral neck fracture which is displaced.    77y Female, ambulates w/ walker at baseline, presents c/o L hip pain and inability to ambulate sp fall at home. Denies HS/LOC. Denies numbness/tingling. Denies fever/chills. Denies pain/injury elsewhere.     HEALTH ISSUES - PROBLEM Dx:    Neuropathy,  Scoliosis  Parkinson's    MEDICATIONS  (STANDING):    Allergies    No Known Allergies    Intolerances    Review of Systems:  Review of Systems: Review of Systems: she is c/o L hip pain , she was confused earlier from IV narcotic pain medications ROS is limited secondary to confusion, dementia   Eyes: no changes in vision   ENT/Mouth: no changes   Cardiovascular: no chest pain   Respiratory: no SOB   Gastrointestinal: no diarrhea, no nausea, no vomiting   Genitourinary: no dysuria   Breast: no pain   Musculoskeletal: L hip pain   Integumentary: no itching   Neurological: no HA, no seizure   Psychiatric: no suicidal ideations   Endocrine: no excessive thirst, polyuria, hot flashes   Hematologic/Lymphatic: no swollen glands   Allergic/Immunologic: no congestion, rash	      Allergies and Intolerances:        Allergies:  	No Known Allergies:     Home Medications:   * Patient Currently Takes Medications as of 11-Nov-2017 08:14 documented in Structured Notes  · 	calcium (as carbonate) 600 mg oral tablet: 2 tab(s) orally once a day, Last Dose Taken:    · 	rasagiline 1 mg oral tablet: 1 tab(s) orally once a day, Last Dose Taken:    · 	Vitamin D3 1000 intl units oral tablet: 1 tab(s) orally once a day, Last Dose Taken:    · 	DULoxetine 30 mg oral delayed release capsule: 1 cap(s) orally 2 times a day, Last Dose Taken:    · 	traMADol 50 mg oral tablet: orally 2 times a day, Last Dose Taken:    · 	gabapentin 600 mg oral tablet: 1 tab(s) orally 3 times a day, Last Dose Taken:    · 	carbidopa-levodopa 25 mg-100 mg oral tablet, extended release: orally 2 times a day, Last Dose Taken:    · 	carbidopa-levodopa 25 mg-100 mg oral tablet: 1.5 tab(s) orally 3 times a day, Last Dose Taken:    · 	carbidopa-levodopa 25 mg-100 mg oral tablet: orally 2 times a day at bedtime, Last Dose Taken:    · 	clonazePAM 0.5 mg oral tablet: orally once a day (at bedtime), Last Dose Taken:    · 	Colace 100 mg oral capsule: 1 cap(s) orally 2 times a day, Last Dose Taken:    · 	SEROquel 25 mg oral tablet: orally once a day (at bedtime), Last Dose Taken:    · 	Senna 8.6 mg oral tablet: orally 2 times a day, Last Dose Taken:    · 	MiraLax oral powder for reconstitution: orally once a day, As Needed, Last Dose Taken:    · 	ibuprofen 800 mg oral tablet: orally every 6 hours, As Needed, Last Dose Taken:      Patient History:   Past Medical History:  Neuropathy    Parkinson's disease    Scoliosis.    Past Surgical History:  No significant past surgical history.    Family History:  Father  Still living? No  Family history of Parkinson disease, Age at diagnosis: Age Unknown     Mother  Still living? No  Family history of age of death, Age at diagnosis: Age Unknown.    Social History:  Social History (marital status, living situation, occupation, tobacco use, alcohol and drug use, and sexual history): no smoking, no alcohol use, lives in the [facility] assisted living	                       12.3   11.4  )-----------( 263      ( 11 Nov 2017 03:00 )             37.2     11 Nov 2017 03:00    141    |  106    |  29     ----------------------------<  96     3.3     |  30     |  1.15     Ca    8.9        11 Nov 2017 03:00      PT/INR - ( 11 Nov 2017 03:00 )   PT: 10.2 sec;   INR: 0.95 ratio         PTT - ( 11 Nov 2017 03:00 )  PTT:31.1 sec  Vital Signs Last 24 Hrs  T(C): 36.2 (11-11-17 @ 03:37), Max: 36.6 (11-11-17 @ 02:20)  T(F): 97.1 (11-11-17 @ 03:37), Max: 97.9 (11-11-17 @ 02:20)  HR: 72 (11-11-17 @ 03:37) (72 - 74)  BP: 127/78 (11-11-17 @ 03:37) (120/58 - 127/78)  BP(mean): --  RR: 17 (11-11-17 @ 03:37) (16 - 17)  SpO2: 100% (11-11-17 @ 03:37) (100% - 100%)    Imaging: XR demonstates L hip fracture - femoral neck displaced.    Physical Exam  Gen: NAD  L LE: skin intact, short/ER leg, unable to SLR L LE, + log roll R/L LE, +ttp hip/groin, no ttp elsewhere, +ehl/fhl/ta/gs function, no calf ttp, dp/pt pulse intact, compartments soft  Secondary survey: benign, nv intact, able to SLR contralateral leg, negative log roll contralateral leg, no bony ttp elsewhere
PCP- [facility]    CC- mechanical fall with LT hip fracture    HPI:  The patient is a 76 yo femal with Hx. of Parkinson's, lumbar spinal stenosis/ scoliosis, Cr. low back pain, neuropathy, HTN, mild dementia, UTI, who fell last night in the [facility] living facility where she resides  was c/o of L hip pain. She was diagnosed with L fem. neck fracture and  referred for admission and surgery. (2017 08:50)    17- s/p LT hip jordana yesterday. Today confused, but calm  Review of system- UTO 2 to confusion    T(C): 37.3 (17 @ 11:04), Max: 37.4 (17 @ 18:21)  HR: 93 (17 @ 11:04) (73 - 100)  BP: 136/47 (17 @ 11:04) (108/50 - 163/69)  RR: 16 (17 @ 11:04) (9 - 18)  SpO2: 98% (17 @ 11:04) (95% - 98%)  Wt(kg): --    LABS:                        9.9    10.2  )-----------( 216      ( 2017 05:33 )             29.8     142  |  109<H>  |  24<H>  ----------------------------<  110<H>  3.5   |  28  |  0.81    Ca    8.1<L>      2017 05:33  TPro  5.8<L>  /  Alb  2.9<L>  /  TBili  0.5  /  DBili  x   /  AST  55<H>  /  ALT  25  /  AlkPhos  60    PT/INR - ( 2017 03:00 )   PT: 10.2 sec;   INR: 0.95 ratio     PTT - ( 2017 03:00 )  PTT:31.1 sec  Urinalysis Basic - ( 2017 13:30 )  Color: Yellow / Appearance: Clear / S.010 / pH: x  Gluc: x / Ketone: Negative  / Bili: Negative / Urobili: Negative mg/dL   Blood: x / Protein: Negative mg/dL / Nitrite: Negative   Leuk Esterase: Negative / RBC: x / WBC x   Sq Epi: x / Non Sq Epi: x / Bacteria: x    RADIOLOGY & ADDITIONAL TESTS:    PHYSICAL EXAM:  GENERAL: NAD, well-groomed, well-developed  HEAD:  Atraumatic, Normocephalic  EYES: EOMI, PERRLA, conjunctiva and sclera clear  HEENT: Moist mucous membranes  NECK: Supple, No JVD  NERVOUS SYSTEM: Confused  CHEST/LUNG: Clear to auscultation bilaterally; No rales, rhonchi, wheezing, or rubs  HEART: Regular rate and rhythm; No murmurs, rubs, or gallops  ABDOMEN: Soft, Nontender, Nondistended; Bowel sounds present  GENITOURINARY- Voiding, no palpable bladder  EXTREMITIES:  2+ Peripheral Pulses, No clubbing, cyanosis, or edema  MUSCULOSKELTAL- LT hip dressing dry  SKIN-no rash, no lesion  CNS- confused    acetaminophen   Tablet 650 milliGRAM(s) Oral every 6 hours PRN  aluminum hydroxide/magnesium hydroxide/simethicone Suspension 30 milliLiter(s) Oral four times a day PRN  ascorbic acid 500 milliGRAM(s) Oral two times a day  calcium carbonate 1250 mG (OsCal) 1 Tablet(s) Oral daily  carbidopa/levodopa  25/100 2 Tablet(s) Oral <User Schedule>  carbidopa/levodopa  25/100 1.5 Tablet(s) Oral <User Schedule>  carbidopa/levodopa CR 25/100 2 Tablet(s) Oral at bedtime  cholecalciferol 1000 Unit(s) Oral daily  clonazePAM Tablet 0.5 milliGRAM(s) Oral at bedtime  docusate sodium 100 milliGRAM(s) Oral two times a day  docusate sodium 100 milliGRAM(s) Oral three times a day  DULoxetine 30 milliGRAM(s) Oral daily  enoxaparin Injectable 40 milliGRAM(s) SubCutaneous every 24 hours  ferrous    sulfate 325 milliGRAM(s) Oral three times a day with meals  folic acid 1 milliGRAM(s) Oral daily  gabapentin 600 milliGRAM(s) Oral three times a day  HYDROmorphone  Injectable 0.5 milliGRAM(s) SubCutaneous every 3 hours PRN  lactated ringers. 1000 milliLiter(s) IV Continuous <Continuous>  magnesium hydroxide Suspension 30 milliLiter(s) Oral two times a day PRN  multivitamin 1 Tablet(s) Oral daily  ondansetron Injectable 4 milliGRAM(s) IV Push every 6 hours PRN  oxyCODONE    IR 5 milliGRAM(s) Oral every 4 hours PRN  oxyCODONE    IR 10 milliGRAM(s) Oral every 4 hours PRN  pantoprazole    Tablet 40 milliGRAM(s) Oral daily  polyethylene glycol 3350 17 Gram(s) Oral daily  QUEtiapine 25 milliGRAM(s) Oral at bedtime  rasagiline Tablet 1 milliGRAM(s) Oral daily  senna 2 Tablet(s) Oral at bedtime    Assessment/Plan  #S/p mechanical fall with LT hip fracture s/p LT jordana  Ortho f/u appreciated  PT as tolerated  Monitor HH  AC by Lovenox  Bowel regimen  Incentive spirometry  Avoid pain meds as it may worsen the confusion    #Confusion likely 2 to anesthesia/postop state/narcotics  Monitor, try to avoid pain meds    #Parkinson's  Cont current meds. Pt eval    #HTN  No prior h/o  Monitor BP    #Dispo- likely [facility] on Tuesday
HPI:  The patient is a 78 yo femal with Hx. of Parkinson's, lumbar spinal stenosis/ scoliosis, Cr. low back pain, neuropathy, HTN, mild dementia, UTI, who fell last night in the [facility] living facility where she resides  was c/o of L hip pain. She was diagnosed with L fem. neck fracture and  referred for admission and surgery. (11 Nov 2017 08:50)      Patient is a 77y old  Female who presents with a chief complaint of Left hip pain after she fell in the assisted living facility (12 Nov 2017 06:59)  Pt s/p left Hip [place] on 11-11-17.    Consulted by Dr. Kehinde Khan  for VTE prophylaxis, risk stratification, and anticoagulation management.    PAST MEDICAL & SURGICAL HISTORY:  Scoliosis  Neuropathy  Parkinson's disease  No significant past surgical history    Caprini VTE Risk Score:8    IMPROVE Bleeding Risk Score: 1.5    Falls Risk:   High (  )  Mod (  )  Low (  )    EBL:  150 ml  crcl: 72.04  11-12-17 pt seen in hallway near [facility] station on 2n, daughter present.  Pt confused knows her name,  Discussed with daughter anticoagulation with lovenox for four weeks post procedure  Questions answered will reinforce as needed.  11-13-17 pt seen in [place] in front of [facility] station for safety.  no changes remains confused.   Vital Signs Last 24 Hrs  T(C): 36.6 (11-13-17 @ 05:30), Max: 37.5 (11-12-17 @ 17:23)  T(F): 97.8 (11-13-17 @ 05:30), Max: 99.5 (11-12-17 @ 17:23)  HR: 100 (11-13-17 @ 05:30) (90 - 100)  BP: 146/- (11-13-17 @ 05:30) (115/69 - 146/-)  BP(mean): 69 (11-12-17 @ 17:23) (69 - 69)  RR: 16 (11-13-17 @ 05:30) (16 - 18)  SpO2: 99% (11-13-17 @ 05:30) (96% - 99%)   Denies any personal or familial history of clotting or bleeding disorders.    Allergies    No Known Allergies    Intolerances        REVIEW OF SYSTEMS    (  )Fever	     (  )Constipation	(  )SOB				(  )Headache	(  )Dysuria  (  )Chills	     (  )Melena	(  )Dyspnea present on exertion	                    (  )Dizziness                    (  )Polyuria  (  )Nausea	     (  )Hematochezia	(  )Cough			                    (  )Syncope   	(  )Hematuria  (  )Vomiting    (  )Chest Pain	(  )Wheezing			(  )Weakness  (  )Diarrhea     (  )Palpitations	(  )Anorexia			(  )Myalgia    Unable to obtain review of systems due to: confusion      PHYSICAL EXAM:    Constitutional: Appears Well    Neurological: A& O x 1 person    Skin: Warm    Respiratory and Thorax: normal effort; Breath sounds: normal; No rales/wheezing/rhonchi  	  Cardiovascular: S1, S2, regular, NMBR	    Gastrointestinal: BS + x 4Q, nontender	    Genitourinary:  Bladder nondistended, nontender    Musculoskeletal:   General Right:   no muscle/joint tenderness,   normal tone, no joint swelling,   ROM:full	    General Left:   no muscle/joint tenderness,   normal tone, no joint swelling,   ROM: limited    Hip:   Left: Dressing CDI;   Lower extrems:   Right: no calf tenderness              negative nubia's sign               + pedal pulses    Left:   no calf tenderness              negative nubia's sign               + pedal pulses                             9.5    12.1  )-----------( 190      ( 13 Nov 2017 06:02 )             28.4       11-13    138  |  105  |  24<H>  ----------------------------<  99  3.2<L>   |  29  |  0.61    Ca    8.4<L>      13 Nov 2017 06:02    TPro  5.8<L>  /  Alb  2.9<L>  /  TBili  0.5  /  DBili  x   /  AST  55<H>  /  ALT  25  /  AlkPhos  60  11-12                         9.9    10.2  )-----------( 216      ( 12 Nov 2017 05:33 )             29.8       11-12    142  |  109<H>  |  24<H>  ----------------------------<  110<H>  3.5   |  28  |  0.81    Ca    8.1<L>      12 Nov 2017 05:33    TPro  5.8<L>  /  Alb  2.9<L>  /  TBili  0.5  /  DBili  x   /  AST  55<H>  /  ALT  25  /  AlkPhos  60  11-12      PT/INR - ( 11 Nov 2017 03:00 )   PT: 10.2 sec;   INR: 0.95 ratio         PTT - ( 11 Nov 2017 03:00 )  PTT:31.1 sec				    MEDICATIONS  (STANDING):  ascorbic acid 500 milliGRAM(s) Oral two times a day  calcium carbonate 1250 mG (OsCal) 1 Tablet(s) Oral daily  carbidopa/levodopa  25/100 2 Tablet(s) Oral <User Schedule>  carbidopa/levodopa  25/100 1.5 Tablet(s) Oral <User Schedule>  carbidopa/levodopa CR 25/100 2 Tablet(s) Oral at bedtime  cholecalciferol 1000 Unit(s) Oral daily  clonazePAM Tablet 0.5 milliGRAM(s) Oral at bedtime  docusate sodium 100 milliGRAM(s) Oral three times a day  DULoxetine 30 milliGRAM(s) Oral daily  enoxaparin Injectable 40 milliGRAM(s) SubCutaneous every 24 hours  ferrous    sulfate 325 milliGRAM(s) Oral three times a day with meals  folic acid 1 milliGRAM(s) Oral daily  gabapentin 600 milliGRAM(s) Oral three times a day  lactated ringers. 1000 milliLiter(s) IV Continuous <Continuous>  multivitamin 1 Tablet(s) Oral daily  pantoprazole    Tablet 40 milliGRAM(s) Oral daily  polyethylene glycol 3350 17 Gram(s) Oral daily  QUEtiapine 25 milliGRAM(s) Oral at bedtime  rasagiline Tablet 1 milliGRAM(s) Oral daily  senna 2 Tablet(s) Oral at bedtime      DVT Prophylaxis:  LMWH                   ( x )  Heparin SQ           (  )  Coumadin             (  )  Xarelto                  (  )  Eliquis                   (  )  Venodynes           ( x )  Ambulation          ( x )  UFH                       (  )  Contraindicated  (  )
Orthopedic Post-op Note    Patient seen and examined at bedside, tolerated procedure well, pain controlled    Vital Signs Last 24 Hrs  T(C): 37.4 (11 Nov 2017 18:21), Max: 37.4 (11 Nov 2017 10:49)  T(F): 99.4 (11 Nov 2017 18:21), Max: 99.4 (11 Nov 2017 10:49)  HR: 80 (11 Nov 2017 18:21) (72 - 110)  BP: 140/77 (11 Nov 2017 18:21) (108/50 - 178/75)  BP(mean): --  RR: 14 (11 Nov 2017 18:21) (9 - 19)  SpO2: 97% (11 Nov 2017 18:21) (95% - 100%)                          11.3   17.0  )-----------( 240      ( 11 Nov 2017 17:21 )             35.6       Physical Exam:  Gen: NAD, Resting comfortably    LLE:  Dressing clean dry intact  +EHL/FHL/TA/GS  SILT L3-S1  +DP/PT Pulses  Compartments soft  No calf TTP B/L
PCP- [facility]    CC- mechanical fall with LT hip fracture    HPI:  The patient is a 76 yo female with Hx. of Parkinson's, lumbar spinal stenosis/ scoliosis, Cr. low back pain, neuropathy, HTN, mild dementia, UTI, who fell at the [facility] where she resides and  was c/o of L hip pain. She was diagnosed with L fem. neck fracture and  referred for admission and surgery. She underwent left hip hemiarthroplasty on . Post op course complicated by confusion which has since improved.     : pt seen and examined. Feels well. No pain at rest. Tolerating po. Was waiting to work with physical therapy.    ROS: all 10 systems reviewed and is as above otherwise negative.    Vital Signs Last 24 Hrs  T(C): 36.9 (2017 11:11), Max: 37.5 (2017 17:23)  T(F): 98.5 (2017 11:11), Max: 99.5 (2017 17:23)  HR: 99 (2017 11:11) (90 - 100)  BP: 111/46 (2017 11:11) (111/46 - 146/-)  BP(mean): 60 (2017 11:11) (60 - 69)  RR: 16 (2017 11:11) (16 - 18)  SpO2: 99% (2017 11:11) (96% - 99%)      PHYSICAL EXAM:  GENERAL: NAD, well-groomed, well-developed  HEAD:  Atraumatic, Normocephalic  EYES: EOMI, PERRLA, conjunctiva and sclera clear  HEENT: Moist mucous membranes  NECK: Supple, No JVD  NERVOUS SYSTEM: Alert, aware of where she is and why, non focal.   CHEST/LUNG: Clear to auscultation bilaterally;   HEART: Regular rate and rhythm;  ABDOMEN: Soft, Nontender, Nondistended; Bowel sounds present  GENITOURINARY- Voiding, no palpable bladder  EXTREMITIES:  2+ Peripheral Pulses, No clubbing, cyanosis, or edema  MUSCULOSKELETAL- LT hip dressing dry  SKIN-no rash, no lesion  LABS:                        9.5    12.1  )-----------( 190      ( 2017 06:02 )             28.4         138  |  105  |  24<H>  ----------------------------<  99  3.2<L>   |  29  |  0.61    Ca    8.4<L>      2017 06:02    TPro  5.8<L>  /  Alb  2.9<L>  /  TBili  0.5  /  DBili  x   /  AST  55<H>  /  ALT  25  /  AlkPhos  60  11-12      Urinalysis Basic - ( 2017 13:30 )    Color: Yellow / Appearance: Clear / S.010 / pH: x  Gluc: x / Ketone: Negative  / Bili: Negative / Urobili: Negative mg/dL   Blood: x / Protein: Negative mg/dL / Nitrite: Negative   Leuk Esterase: Negative / RBC: x / WBC x   Sq Epi: x / Non Sq Epi: x / Bacteria: x        acetaminophen   Tablet 650 milliGRAM(s) Oral every 6 hours PRN  aluminum hydroxide/magnesium hydroxide/simethicone Suspension 30 milliLiter(s) Oral four times a day PRN  ascorbic acid 500 milliGRAM(s) Oral two times a day  calcium carbonate 1250 mG (OsCal) 1 Tablet(s) Oral daily  carbidopa/levodopa  25/100 2 Tablet(s) Oral <User Schedule>  carbidopa/levodopa  25/100 1.5 Tablet(s) Oral <User Schedule>  carbidopa/levodopa CR 25/100 2 Tablet(s) Oral at bedtime  cholecalciferol 1000 Unit(s) Oral daily  clonazePAM Tablet 0.5 milliGRAM(s) Oral at bedtime  docusate sodium 100 milliGRAM(s) Oral two times a day  docusate sodium 100 milliGRAM(s) Oral three times a day  DULoxetine 30 milliGRAM(s) Oral daily  enoxaparin Injectable 40 milliGRAM(s) SubCutaneous every 24 hours  ferrous    sulfate 325 milliGRAM(s) Oral three times a day with meals  folic acid 1 milliGRAM(s) Oral daily  gabapentin 600 milliGRAM(s) Oral three times a day  HYDROmorphone  Injectable 0.5 milliGRAM(s) SubCutaneous every 3 hours PRN  lactated ringers. 1000 milliLiter(s) IV Continuous <Continuous>  magnesium hydroxide Suspension 30 milliLiter(s) Oral two times a day PRN  multivitamin 1 Tablet(s) Oral daily  ondansetron Injectable 4 milliGRAM(s) IV Push every 6 hours PRN  oxyCODONE    IR 5 milliGRAM(s) Oral every 4 hours PRN  oxyCODONE    IR 10 milliGRAM(s) Oral every 4 hours PRN  pantoprazole    Tablet 40 milliGRAM(s) Oral daily  polyethylene glycol 3350 17 Gram(s) Oral daily  QUEtiapine 25 milliGRAM(s) Oral at bedtime  rasagiline Tablet 1 milliGRAM(s) Oral daily  senna 2 Tablet(s) Oral at bedtime    Assessment/Plan    77F, pmh as above a/w:    #S/p mechanical fall with LT hip fracture s/p LT jordana  Ortho f/u appreciated  PT as tolerated  Monitor   AC by Lovenox  Bowel regimen  Incentive spirometry      #Confusion likely delerium due to anesthesia/postop state/narcotics  -improved    #Parkinson's  Cont current meds. Pt eval    #Dispo:  medically stable for dc to [facility].
Pt S/E at bedside, no acute events overnight, pain controlled    Vital Signs Last 24 Hrs  T(C): 36.6 (13 Nov 2017 05:30), Max: 37.5 (12 Nov 2017 17:23)  T(F): 97.8 (13 Nov 2017 05:30), Max: 99.5 (12 Nov 2017 17:23)  HR: 100 (13 Nov 2017 05:30) (90 - 100)  BP: 146/- (13 Nov 2017 05:30) (115/69 - 146/-)  BP(mean): 69 (12 Nov 2017 17:23) (69 - 69)  RR: 16 (13 Nov 2017 05:30) (16 - 18)  SpO2: 99% (13 Nov 2017 05:30) (96% - 99%)    Gen: NAD, resting comfortably    Left Lower Extremity:  Dressing clean dry intact, replaced yesterday 2/2 pt pulling dressing off, +abd pillow  +EHL/FHL/TA/GS  SILT L3-S1  +DP/PT Pulses  Compartments soft  No calf TTP B/L       77F s/p L hip hemiarthroplasty POD 2    Analgesia, careful narcotic use  WBAT with posterior hip precautions  PT with posterior hip precautions  Incentive spirometry  will obtain AP pelvis on day of discharge  DC planning
Pt S/E at bedside, no acute events overnight, pain controlled    Vital Signs Last 24 Hrs  T(C): 37.4 (11 Nov 2017 23:10), Max: 37.4 (11 Nov 2017 10:49)  T(F): 99.4 (11 Nov 2017 23:10), Max: 99.4 (11 Nov 2017 10:49)  HR: 89 (11 Nov 2017 23:10) (73 - 110)  BP: 113/57 (11 Nov 2017 23:10) (108/50 - 178/75)  BP(mean): --  RR: 16 (11 Nov 2017 23:10) (9 - 19)  SpO2: 96% (11 Nov 2017 23:10) (95% - 99%)    Gen: NAD, resting comfortably    Left Lower Extremity:  Dressing clean dry intact, +abd pillow  +EHL/FHL/TA/GS  SILT L3-S1  +DP/PT Pulses  Compartments soft  No calf TTP B/L
Pt S/E at bedside, no acute events overnight, pain controlled.  Confused and under close supervision in hallway.     Vital Signs Last 24 Hrs  T(C): 37.3 (12 Nov 2017 11:04), Max: 37.4 (11 Nov 2017 18:21)  T(F): 99.1 (12 Nov 2017 11:04), Max: 99.4 (11 Nov 2017 18:21)  HR: 93 (12 Nov 2017 11:04) (73 - 95)  BP: 136/47 (12 Nov 2017 11:04) (108/50 - 163/69)  BP(mean): --  RR: 16 (12 Nov 2017 11:04) (9 - 16)  SpO2: 98% (12 Nov 2017 11:04) (95% - 98%)                          9.9    10.2  )-----------( 216      ( 12 Nov 2017 05:33 )             29.8   11-12    142  |  109<H>  |  24<H>  ----------------------------<  110<H>  3.5   |  28  |  0.81    Ca    8.1<L>      12 Nov 2017 05:33    TPro  5.8<L>  /  Alb  2.9<L>  /  TBili  0.5  /  DBili  x   /  AST  55<H>  /  ALT  25  /  AlkPhos  60  11-12  PT/INR - ( 11 Nov 2017 03:00 )   PT: 10.2 sec;   INR: 0.95 ratio         PTT - ( 11 Nov 2017 03:00 )  PTT:31.1 sec    Gen: NAD, resting comfortably    Left Lower Extremity:  Dressing clean dry intact, +abd pillow  +EHL/FHL/TA/GS  SILT L3-S1  +DP/PT Pulses  Compartments soft  No calf TTP B/L

## 2017-11-15 DIAGNOSIS — T81.89XA OTHER COMPLICATIONS OF PROCEDURES, NOT ELSEWHERE CLASSIFIED, INITIAL ENCOUNTER: ICD-10-CM

## 2017-11-15 DIAGNOSIS — T40.605A ADVERSE EFFECT OF UNSPECIFIED NARCOTICS, INITIAL ENCOUNTER: ICD-10-CM

## 2017-11-15 DIAGNOSIS — G89.29 OTHER CHRONIC PAIN: ICD-10-CM

## 2017-11-15 DIAGNOSIS — G20 PARKINSON'S DISEASE: ICD-10-CM
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
## 2017-11-15 DIAGNOSIS — G62.9 POLYNEUROPATHY, UNSPECIFIED: ICD-10-CM
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
## 2017-11-15 DIAGNOSIS — I10 ESSENTIAL (PRIMARY) HYPERTENSION: ICD-10-CM

## 2017-11-15 DIAGNOSIS — M25.552 PAIN IN LEFT HIP: ICD-10-CM

## 2017-11-15 DIAGNOSIS — S72.002A FRACTURE OF UNSPECIFIED PART OF NECK OF LEFT FEMUR, INITIAL ENCOUNTER FOR CLOSED FRACTURE: ICD-10-CM

## 2017-11-15 DIAGNOSIS — D62 ACUTE POSTHEMORRHAGIC ANEMIA: ICD-10-CM

## 2017-11-15 DIAGNOSIS — R41.0 DISORIENTATION, UNSPECIFIED: ICD-10-CM

## 2017-11-15 DIAGNOSIS — E87.6 HYPOKALEMIA: ICD-10-CM

## 2017-11-15 DIAGNOSIS — M54.5 LOW BACK PAIN: ICD-10-CM

## 2017-11-15 DIAGNOSIS — M41.9 SCOLIOSIS, UNSPECIFIED: ICD-10-CM

## 2017-11-15 DIAGNOSIS — M48.061 SPINAL STENOSIS, LUMBAR REGION WITHOUT NEUROGENIC CLAUDICATION: ICD-10-CM

## 2017-11-15 DIAGNOSIS — T41.205A ADVERSE EFFECT OF UNSPECIFIED GENERAL ANESTHETICS, INITIAL ENCOUNTER: ICD-10-CM
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
## 2017-11-15 DIAGNOSIS — F02.80 DEMENTIA IN OTHER DISEASES CLASSIFIED ELSEWHERE, UNSPECIFIED SEVERITY, WITHOUT BEHAVIORAL DISTURBANCE, PSYCHOTIC DISTURBANCE, MOOD DISTURBANCE, AND ANXIETY: ICD-10-CM

## 2017-11-15 DIAGNOSIS — Z79.1 LONG TERM (CURRENT) USE OF NON-STEROIDAL ANTI-INFLAMMATORIES (NSAID): ICD-10-CM
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
## 2017-11-16 LAB — SURGICAL PATHOLOGY FINAL REPORT - CH: SIGNIFICANT CHANGE UP

## 2017-12-01 ENCOUNTER — APPOINTMENT (OUTPATIENT)
Dept: ORTHOPEDIC SURGERY | Facility: CLINIC | Age: 77
End: 2017-12-01
Payer: MEDICARE

## 2017-12-01 DIAGNOSIS — Z82.61 FAMILY HISTORY OF ARTHRITIS: ICD-10-CM

## 2017-12-01 DIAGNOSIS — Z78.9 OTHER SPECIFIED HEALTH STATUS: ICD-10-CM

## 2017-12-01 DIAGNOSIS — Z82.62 FAMILY HISTORY OF OSTEOPOROSIS: ICD-10-CM

## 2017-12-01 PROCEDURE — 99024 POSTOP FOLLOW-UP VISIT: CPT

## 2017-12-01 PROCEDURE — 73502 X-RAY EXAM HIP UNI 2-3 VIEWS: CPT | Mod: LT

## 2017-12-02 PROBLEM — Z78.9 CURRENT NON-SMOKER: Status: ACTIVE | Noted: 2017-12-01

## 2017-12-02 PROBLEM — Z78.9 DOES NOT USE ILLICIT DRUGS: Status: ACTIVE | Noted: 2017-12-01

## 2017-12-02 PROBLEM — Z78.9 EXERCISES OCCASIONALLY: Status: ACTIVE | Noted: 2017-12-01

## 2017-12-02 PROBLEM — Z82.61 FAMILY HISTORY OF ARTHRITIS: Status: ACTIVE | Noted: 2017-12-01

## 2017-12-02 PROBLEM — Z82.62 FAMILY HISTORY OF OSTEOPOROSIS: Status: ACTIVE | Noted: 2017-12-01

## 2017-12-02 PROBLEM — Z78.9 RARELY CONSUMES ALCOHOL: Status: ACTIVE | Noted: 2017-12-01

## 2017-12-02 RX ORDER — DULOXETINE HYDROCHLORIDE 30 MG/1
30 CAPSULE, DELAYED RELEASE PELLETS ORAL
Qty: 180 | Refills: 0 | Status: ACTIVE | COMMUNITY
Start: 2017-01-10

## 2017-12-02 RX ORDER — CEPHALEXIN 500 MG/1
500 CAPSULE ORAL
Qty: 30 | Refills: 0 | Status: ACTIVE | COMMUNITY
Start: 2017-09-12

## 2017-12-02 RX ORDER — RASAGILINE 1 MG/1
1 TABLET ORAL
Qty: 30 | Refills: 0 | Status: ACTIVE | COMMUNITY
Start: 2017-05-10

## 2017-12-02 RX ORDER — IBUPROFEN 800 MG/1
800 TABLET, FILM COATED ORAL
Qty: 120 | Refills: 0 | Status: ACTIVE | COMMUNITY
Start: 2017-02-13

## 2017-12-02 RX ORDER — AMPICILLIN TRIHYDRATE 500 MG
25 MCG CAPSULE ORAL
Qty: 60 | Refills: 0 | Status: ACTIVE | COMMUNITY
Start: 2017-02-21

## 2018-01-04 ENCOUNTER — EMERGENCY (EMERGENCY)
Facility: HOSPITAL | Age: 78
LOS: 0 days | Discharge: HOME | End: 2018-01-04
Attending: EMERGENCY MEDICINE | Admitting: EMERGENCY MEDICINE
Payer: MEDICARE

## 2018-01-04 VITALS
OXYGEN SATURATION: 98 % | TEMPERATURE: 98 F | SYSTOLIC BLOOD PRESSURE: 140 MMHG | DIASTOLIC BLOOD PRESSURE: 58 MMHG | RESPIRATION RATE: 19 BRPM | HEART RATE: 89 BPM

## 2018-01-04 VITALS
OXYGEN SATURATION: 98 % | HEART RATE: 81 BPM | SYSTOLIC BLOOD PRESSURE: 154 MMHG | RESPIRATION RATE: 15 BRPM | DIASTOLIC BLOOD PRESSURE: 83 MMHG | TEMPERATURE: 98 F | WEIGHT: 100.09 LBS | HEIGHT: 63 IN

## 2018-01-04 DIAGNOSIS — Z91.81 HISTORY OF FALLING: ICD-10-CM

## 2018-01-04 DIAGNOSIS — Z96.649 PRESENCE OF UNSPECIFIED ARTIFICIAL HIP JOINT: Chronic | ICD-10-CM

## 2018-01-04 DIAGNOSIS — Z96.642 PRESENCE OF LEFT ARTIFICIAL HIP JOINT: ICD-10-CM

## 2018-01-04 DIAGNOSIS — Z79.899 OTHER LONG TERM (CURRENT) DRUG THERAPY: ICD-10-CM

## 2018-01-04 PROCEDURE — 73502 X-RAY EXAM HIP UNI 2-3 VIEWS: CPT | Mod: 26

## 2018-01-04 PROCEDURE — 99283 EMERGENCY DEPT VISIT LOW MDM: CPT
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
## 2018-01-04 NOTE — ED ADULT TRIAGE NOTE - CHIEF COMPLAINT QUOTE
Witnessed fall from [facility], negative head hitting, negative LOC. Pt recently injured hip which is why she is in [facility] and states it is bothering her now 3/10, no new pains/injuries. Pt states she was attempting to use a new walker and "it went faster than I did"

## 2018-01-04 NOTE — ED ADULT NURSE REASSESSMENT NOTE - NS ED NURSE REASSESS COMMENT FT1
Received care of pt from Sarah Chester RN. Pt is discharged awaiting transport back to [facility]. VSS

## 2018-01-04 NOTE — ED PROVIDER NOTE - OBJECTIVE STATEMENT
78 yo female biba from [facility] assited living for fall from standing height.  Pt states her walker went faster than she was going and she lowered herself to the ground. did not hit head. 78 yo female biba from [facility] assited living for fall from standing height.  Pt states her walker went faster than she was going and she lowered herself to the ground. did not hit head.  Pt denies pain

## 2018-02-14 ENCOUNTER — EMERGENCY (EMERGENCY)
Facility: HOSPITAL | Age: 78
LOS: 0 days | Discharge: HOME | End: 2018-02-14
Attending: EMERGENCY MEDICINE | Admitting: EMERGENCY MEDICINE
Payer: MEDICARE

## 2018-02-14 VITALS
HEIGHT: 67 IN | DIASTOLIC BLOOD PRESSURE: 98 MMHG | HEART RATE: 82 BPM | RESPIRATION RATE: 18 BRPM | TEMPERATURE: 98 F | SYSTOLIC BLOOD PRESSURE: 182 MMHG | WEIGHT: 139.99 LBS | OXYGEN SATURATION: 98 %

## 2018-02-14 VITALS
OXYGEN SATURATION: 99 % | TEMPERATURE: 98 F | RESPIRATION RATE: 17 BRPM | DIASTOLIC BLOOD PRESSURE: 86 MMHG | SYSTOLIC BLOOD PRESSURE: 152 MMHG | HEART RATE: 92 BPM
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
## 2018-02-14 DIAGNOSIS — M41.9 SCOLIOSIS, UNSPECIFIED: ICD-10-CM

## 2018-02-14 DIAGNOSIS — M25.552 PAIN IN LEFT HIP: ICD-10-CM

## 2018-02-14 DIAGNOSIS — S73.005A UNSPECIFIED DISLOCATION OF LEFT HIP, INITIAL ENCOUNTER: ICD-10-CM

## 2018-02-14 DIAGNOSIS — G20 PARKINSON'S DISEASE: ICD-10-CM
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
## 2018-02-14 DIAGNOSIS — G62.9 POLYNEUROPATHY, UNSPECIFIED: ICD-10-CM

## 2018-02-14 PROCEDURE — 71045 X-RAY EXAM CHEST 1 VIEW: CPT | Mod: 26

## 2018-02-14 PROCEDURE — 99152 MOD SED SAME PHYS/QHP 5/>YRS: CPT

## 2018-02-14 PROCEDURE — 72192 CT PELVIS W/O DYE: CPT | Mod: 26

## 2018-02-14 PROCEDURE — 73502 X-RAY EXAM HIP UNI 2-3 VIEWS: CPT | Mod: 26,76

## 2018-02-14 PROCEDURE — 76377 3D RENDER W/INTRP POSTPROCES: CPT | Mod: 26

## 2018-02-14 PROCEDURE — 99285 EMERGENCY DEPT VISIT HI MDM: CPT | Mod: 25

## 2018-02-14 PROCEDURE — 70450 CT HEAD/BRAIN W/O DYE: CPT | Mod: 26

## 2018-02-14 RX ORDER — CARBIDOPA AND LEVODOPA 25; 100 MG/1; MG/1
2 TABLET ORAL ONCE
Qty: 0 | Refills: 0 | Status: COMPLETED | OUTPATIENT
Start: 2018-02-14 | End: 2018-02-14

## 2018-02-14 RX ADMIN — CARBIDOPA AND LEVODOPA 2 TABLET(S): 25; 100 TABLET ORAL at 21:09

## 2018-02-14 NOTE — ED ADULT NURSE NOTE - CHPI ED SYMPTOMS NEG
no abrasion/no weakness/no confusion/no loss of consciousness/no numbness/no vomiting/no fever/no bleeding/no tingling

## 2018-02-14 NOTE — ED ADULT NURSE NOTE - CHIEF COMPLAINT QUOTE
from [facility] s/p unwitnessed fall with left leg pain, no blood thinners, did not hit her head. patient was found on the floor by staff, recent hip replacement on same side as pain

## 2018-02-14 NOTE — ED PROVIDER NOTE - PROGRESS NOTE DETAILS
Norma Garcia: + hip dislocation, Orthopedics have been paged for relocation. Norma Garcia: Consulted with Ortho, aware of Pt. Will see Pt at bedside. AJM: hid reduced by ortho. pt awake, alert, feeling well. Pts daughter spoke to the staff at [facility] who will send extra help to pts room and are comfortable taking her back tonight. will dc.

## 2018-02-14 NOTE — ED PROVIDER NOTE - OBJECTIVE STATEMENT
5y-o Female with PMHX of Parkinson's dieses, Partial hip replacement, presents to the ED s/p fall. Pt states she went to go get something from the cabinet when she fell and landed on her left hip. Pt c/o of Left hip pain. Denies LOC. Did not hit head. Pt notes left hip pain started prior to the fall. Denies SOB, CP, HA. Notes chronic left leg shortness secondary to scoliosis. Pt's daughter at bedside states Pt was confused during incident. 77 y-o Female with PMHX of Parkinson's dieses, Partial hip replacement, presents to the ED s/p fall. Pt states she went to go get something from the cabinet when she slipped and fell landing on her left hip. Pt c/o of Left hip pain. Denies LOC. Did not hit head. Pt notes left hip pain started prior to the fall. Denies SOB, CP, HA. Notes chronic left leg shortness secondary to scoliosis. Pt's daughter at bedside states Pt was confused during incident. 77 y-o Female with PMHX of Parkinson's dieses, Partial hip replacement, presents to the ED s/p fall. Pt states she went to go get something from the cabinet when she slipped and fell landing on her left hip. Pt c/o of Left hip pain. Denies LOC. Did not hit head. Pt notes left hip pain started prior to the fall. Denies SOB, CP, HA. Notes chronic left leg shortness secondary to scoliosis. Pt's daughter at bedside states Pt was confused during incident. No focal weakness or numbness. no cgnae in mental status

## 2018-02-14 NOTE — ED ADULT TRIAGE NOTE - CHIEF COMPLAINT QUOTE
from [facility] s/p unwitnessed fall with left leg pain, no blood thinners. pateint was found on the floor by staff from [facility] s/p unwitnessed fall with left leg pain, no blood thinners, did not hit her head. patient was found on the floor by staff, recent hip replacement on same side as pain

## 2018-02-14 NOTE — ED ADULT NURSE REASSESSMENT NOTE - NS ED NURSE REASSESS COMMENT FT1
Pt currently alert and oriented.  No c/o pain at this time, vss.  Pt OK for DC as per Dr Richardson.  Safety maintained with daughter at [facility].

## 2018-02-14 NOTE — ED ADULT NURSE NOTE - OBJECTIVE STATEMENT
Pt. c/o of left hip pain s/p fall from standing height. Pt. denies LOC, head trauma, blood thinners, dizziness, visual changes, SOB, CP. Pt. states she was walking in the [place] at [facility] and tripped and fell. Pt. had left hip replacement in November. Pt. has PMSx4, strong pedal pulse in left foot present, Pt. has hx of parkinson's.  Pt. left foot visibly shorter than right foot.

## 2018-02-14 NOTE — PROCEDURE NOTE - PROCEDURE
<<-----Click on this checkbox to enter Procedure Reduction, dislocation, hip  02/14/2018    Active  LIZABETH

## 2018-02-14 NOTE — ED PROVIDER NOTE - MEDICAL DECISION MAKING DETAILS
Pt Presents to the ED c/o of left hip pain s/p mechanical fall. No signs of trauma. Neurologically intact.  Plan: Ct head, X-ray hip/pelvis.

## 2018-02-14 NOTE — ED PROCEDURE NOTE - NS_POSTPROCCAREGUIDE_ED_ALL_ED
Patient is now fully awake, with vital signs and temperature stable, hydration is adequate, patients Chintan’s  score is at baseline (or greater than 8), patient and escort has received  discharge education.

## 2018-02-14 NOTE — CONSULT NOTE ADULT - SUBJECTIVE AND OBJECTIVE BOX
.Richelle called 1900  Consult seen 1910  Procedure performed 1940    HPI  77F complains of left hip pain for 1 day status post mechanical fall. Patient denies numbness, paresthesias, headstrike, loss of consciousness, or any other signs/symptoms at this time.    Allergies: NKDA  PMH: Parkinson's, neuropathy  PSH: Left hip hemiarthroplasty (Bill Nov 11)  Social: Denies tobacco use  FH: Noncontributory  Imaging: XR left hip - posteriorlateral hip dislocation    ROS: Negative for all systems except as noted above in HPI    Physical exam  VS: Afebrile, vital signs stable  Gen: NAD  left LE: incision at hip well healed. No erythema/ecchymosis/warmth. No TTP bony prominences at knee/ankle/foot.+EHL/FHL/TA/GS. SILT L3-S1, +DP pulse, capillary refill brisk. Compartments soft and nontender.  Secondary survey: No TTP bony prominences with full painless AROM at baseline per patient. SILT. Capillary refill brisk. Able to SLR with contralateral leg. No TTP axial spine.    Procedure  Closed reduction left hip  Sedation provided by ED attending  Left hip closed reduced using internal rotation, flexion, and adduction.  Postop XR show reduced left hip  Patient neurovascularly intact s/p reduction (+EHL/FHL/GSC/TA)      77F with left hip hemiarthroplasty dislocation s/p reduction    WBAT  Pain control  FU official read CT hip  No planned orthopedic intervention at this time  Ortho stable for discharge  Follow up in office within 5 days of discharge with Dr Khan  Will discuss with attending and advise if change  Posterior hip precautions - Abduction pillow while seated or supine. Do not bend hip past 90 degrees. Do not turn knee/foot inward. Do not cross leg past middle of your body

## 2018-02-15 ENCOUNTER — INPATIENT (INPATIENT)
Facility: HOSPITAL | Age: 78
LOS: 3 days | Discharge: NURSING, REHAB OR LONG-TERM CARE | End: 2018-02-19
Attending: INTERNAL MEDICINE | Admitting: INTERNAL MEDICINE
Payer: MEDICARE

## 2018-02-15 VITALS
HEART RATE: 95 BPM | HEIGHT: 65 IN | RESPIRATION RATE: 17 BRPM | TEMPERATURE: 98 F | OXYGEN SATURATION: 93 % | SYSTOLIC BLOOD PRESSURE: 129 MMHG | WEIGHT: 160.06 LBS | DIASTOLIC BLOOD PRESSURE: 70 MMHG

## 2018-02-15 DIAGNOSIS — Z96.649 PRESENCE OF UNSPECIFIED ARTIFICIAL HIP JOINT: Chronic | ICD-10-CM

## 2018-02-15 LAB
ALBUMIN SERPL ELPH-MCNC: 3.9 G/DL — SIGNIFICANT CHANGE UP (ref 3.3–5)
ALP SERPL-CCNC: 79 U/L — SIGNIFICANT CHANGE UP (ref 40–120)
ALT FLD-CCNC: 10 U/L — LOW (ref 12–78)
ANION GAP SERPL CALC-SCNC: 5 MMOL/L — SIGNIFICANT CHANGE UP (ref 5–17)
APPEARANCE UR: CLEAR — SIGNIFICANT CHANGE UP
APTT BLD: 26.1 SEC — LOW (ref 27.5–37.4)
AST SERPL-CCNC: 39 U/L — HIGH (ref 15–37)
BASOPHILS # BLD AUTO: 0.1 K/UL — SIGNIFICANT CHANGE UP (ref 0–0.2)
BASOPHILS NFR BLD AUTO: 0.4 % — SIGNIFICANT CHANGE UP (ref 0–2)
BILIRUB SERPL-MCNC: 0.5 MG/DL — SIGNIFICANT CHANGE UP (ref 0.2–1.2)
BILIRUB UR-MCNC: NEGATIVE — SIGNIFICANT CHANGE UP
BUN SERPL-MCNC: 28 MG/DL — HIGH (ref 7–23)
CALCIUM SERPL-MCNC: 9.5 MG/DL — SIGNIFICANT CHANGE UP (ref 8.5–10.1)
CHLORIDE SERPL-SCNC: 103 MMOL/L — SIGNIFICANT CHANGE UP (ref 96–108)
CO2 SERPL-SCNC: 31 MMOL/L — SIGNIFICANT CHANGE UP (ref 22–31)
COLOR SPEC: YELLOW — SIGNIFICANT CHANGE UP
CREAT SERPL-MCNC: 0.84 MG/DL — SIGNIFICANT CHANGE UP (ref 0.5–1.3)
DIFF PNL FLD: NEGATIVE — SIGNIFICANT CHANGE UP
EOSINOPHIL # BLD AUTO: 0 K/UL — SIGNIFICANT CHANGE UP (ref 0–0.5)
EOSINOPHIL NFR BLD AUTO: 0 % — SIGNIFICANT CHANGE UP (ref 0–6)
ERYTHROCYTE [SEDIMENTATION RATE] IN BLOOD: 18 MM/HR — SIGNIFICANT CHANGE UP (ref 0–20)
GLUCOSE SERPL-MCNC: 168 MG/DL — HIGH (ref 70–99)
GLUCOSE UR QL: NEGATIVE MG/DL — SIGNIFICANT CHANGE UP
HCT VFR BLD CALC: 41.3 % — SIGNIFICANT CHANGE UP (ref 34.5–45)
HGB BLD-MCNC: 13.7 G/DL — SIGNIFICANT CHANGE UP (ref 11.5–15.5)
INR BLD: 1.01 RATIO — SIGNIFICANT CHANGE UP (ref 0.88–1.16)
KETONES UR-MCNC: (no result)
LACTATE SERPL-SCNC: 1.1 MMOL/L — SIGNIFICANT CHANGE UP (ref 0.7–2)
LEUKOCYTE ESTERASE UR-ACNC: (no result)
LYMPHOCYTES # BLD AUTO: 0.6 K/UL — LOW (ref 1–3.3)
LYMPHOCYTES # BLD AUTO: 3.5 % — LOW (ref 13–44)
MCHC RBC-ENTMCNC: 29.9 PG — SIGNIFICANT CHANGE UP (ref 27–34)
MCHC RBC-ENTMCNC: 33.2 GM/DL — SIGNIFICANT CHANGE UP (ref 32–36)
MCV RBC AUTO: 90.3 FL — SIGNIFICANT CHANGE UP (ref 80–100)
MONOCYTES # BLD AUTO: 1.1 K/UL — HIGH (ref 0–0.9)
MONOCYTES NFR BLD AUTO: 6.8 % — SIGNIFICANT CHANGE UP (ref 2–14)
NEUTROPHILS # BLD AUTO: 13.9 K/UL — HIGH (ref 1.8–7.4)
NEUTROPHILS NFR BLD AUTO: 89.2 % — HIGH (ref 43–77)
NITRITE UR-MCNC: NEGATIVE — SIGNIFICANT CHANGE UP
PH UR: 7 — SIGNIFICANT CHANGE UP (ref 5–8)
PLATELET # BLD AUTO: 304 K/UL — SIGNIFICANT CHANGE UP (ref 150–400)
POTASSIUM SERPL-MCNC: 3.4 MMOL/L — LOW (ref 3.5–5.3)
POTASSIUM SERPL-SCNC: 3.4 MMOL/L — LOW (ref 3.5–5.3)
PROT SERPL-MCNC: 7.3 GM/DL — SIGNIFICANT CHANGE UP (ref 6–8.3)
PROT UR-MCNC: 30 MG/DL
PROTHROM AB SERPL-ACNC: 10.9 SEC — SIGNIFICANT CHANGE UP (ref 9.8–12.7)
RBC # BLD: 4.58 M/UL — SIGNIFICANT CHANGE UP (ref 3.8–5.2)
RBC # FLD: 12.1 % — SIGNIFICANT CHANGE UP (ref 10.3–14.5)
RBC CASTS # UR COMP ASSIST: SIGNIFICANT CHANGE UP /HPF (ref 0–4)
SODIUM SERPL-SCNC: 139 MMOL/L — SIGNIFICANT CHANGE UP (ref 135–145)
SP GR SPEC: 1.01 — SIGNIFICANT CHANGE UP (ref 1.01–1.02)
UROBILINOGEN FLD QL: NEGATIVE MG/DL — SIGNIFICANT CHANGE UP
WBC # BLD: 15.6 K/UL — HIGH (ref 3.8–10.5)
WBC # FLD AUTO: 15.6 K/UL — HIGH (ref 3.8–10.5)
WBC UR QL: SIGNIFICANT CHANGE UP

## 2018-02-15 PROCEDURE — 70450 CT HEAD/BRAIN W/O DYE: CPT | Mod: 26

## 2018-02-15 PROCEDURE — 99285 EMERGENCY DEPT VISIT HI MDM: CPT

## 2018-02-15 PROCEDURE — 73501 X-RAY EXAM HIP UNI 1 VIEW: CPT | Mod: 26,77,59,LT

## 2018-02-15 PROCEDURE — 73501 X-RAY EXAM HIP UNI 1 VIEW: CPT | Mod: 26,59,LT

## 2018-02-15 PROCEDURE — 93010 ELECTROCARDIOGRAM REPORT: CPT

## 2018-02-15 PROCEDURE — 71045 X-RAY EXAM CHEST 1 VIEW: CPT | Mod: 26

## 2018-02-15 PROCEDURE — 73502 X-RAY EXAM HIP UNI 2-3 VIEWS: CPT | Mod: 26,LT

## 2018-02-15 RX ORDER — CALCIUM CARBONATE 500(1250)
1 TABLET ORAL DAILY
Qty: 0 | Refills: 0 | Status: DISCONTINUED | OUTPATIENT
Start: 2018-02-15 | End: 2018-02-16

## 2018-02-15 RX ORDER — CARBIDOPA AND LEVODOPA 25; 100 MG/1; MG/1
2 TABLET ORAL DAILY
Qty: 0 | Refills: 0 | Status: DISCONTINUED | OUTPATIENT
Start: 2018-02-16 | End: 2018-02-16

## 2018-02-15 RX ORDER — MORPHINE SULFATE 50 MG/1
2 CAPSULE, EXTENDED RELEASE ORAL ONCE
Qty: 0 | Refills: 0 | Status: DISCONTINUED | OUTPATIENT
Start: 2018-02-15 | End: 2018-02-15

## 2018-02-15 RX ORDER — CARBIDOPA AND LEVODOPA 25; 100 MG/1; MG/1
2 TABLET ORAL DAILY
Qty: 0 | Refills: 0 | Status: DISCONTINUED | OUTPATIENT
Start: 2018-02-15 | End: 2018-02-15

## 2018-02-15 RX ORDER — CARBIDOPA AND LEVODOPA 25; 100 MG/1; MG/1
1.5 TABLET ORAL DAILY
Qty: 0 | Refills: 0 | Status: DISCONTINUED | OUTPATIENT
Start: 2018-02-16 | End: 2018-02-16

## 2018-02-15 RX ORDER — QUETIAPINE FUMARATE 200 MG/1
0 TABLET, FILM COATED ORAL
Qty: 0 | Refills: 0 | COMMUNITY

## 2018-02-15 RX ORDER — ACETAMINOPHEN 500 MG
650 TABLET ORAL EVERY 6 HOURS
Qty: 0 | Refills: 0 | Status: DISCONTINUED | OUTPATIENT
Start: 2018-02-15 | End: 2018-02-16

## 2018-02-15 RX ORDER — ENOXAPARIN SODIUM 100 MG/ML
40 INJECTION SUBCUTANEOUS EVERY 24 HOURS
Qty: 0 | Refills: 0 | Status: DISCONTINUED | OUTPATIENT
Start: 2018-02-15 | End: 2018-02-16

## 2018-02-15 RX ORDER — PROPOFOL 10 MG/ML
100 INJECTION, EMULSION INTRAVENOUS ONCE
Qty: 0 | Refills: 0 | Status: COMPLETED | OUTPATIENT
Start: 2018-02-15 | End: 2018-02-15

## 2018-02-15 RX ORDER — CEFEPIME 1 G/1
1000 INJECTION, POWDER, FOR SOLUTION INTRAMUSCULAR; INTRAVENOUS EVERY 12 HOURS
Qty: 0 | Refills: 0 | Status: DISCONTINUED | OUTPATIENT
Start: 2018-02-15 | End: 2018-02-15

## 2018-02-15 RX ORDER — CHOLECALCIFEROL (VITAMIN D3) 125 MCG
1 CAPSULE ORAL
Qty: 0 | Refills: 0 | COMMUNITY

## 2018-02-15 RX ORDER — POLYETHYLENE GLYCOL 3350 17 G/17G
0 POWDER, FOR SOLUTION ORAL
Qty: 0 | Refills: 0 | COMMUNITY

## 2018-02-15 RX ORDER — GABAPENTIN 400 MG/1
600 CAPSULE ORAL THREE TIMES A DAY
Qty: 0 | Refills: 0 | Status: DISCONTINUED | OUTPATIENT
Start: 2018-02-15 | End: 2018-02-16

## 2018-02-15 RX ORDER — RASAGILINE 0.5 MG/1
1 TABLET ORAL DAILY
Qty: 0 | Refills: 0 | Status: DISCONTINUED | OUTPATIENT
Start: 2018-02-15 | End: 2018-02-16

## 2018-02-15 RX ORDER — DULOXETINE HYDROCHLORIDE 30 MG/1
30 CAPSULE, DELAYED RELEASE ORAL DAILY
Qty: 0 | Refills: 0 | Status: DISCONTINUED | OUTPATIENT
Start: 2018-02-15 | End: 2018-02-16

## 2018-02-15 RX ORDER — CARBIDOPA AND LEVODOPA 25; 100 MG/1; MG/1
1 TABLET ORAL THREE TIMES A DAY
Qty: 0 | Refills: 0 | Status: DISCONTINUED | OUTPATIENT
Start: 2018-02-15 | End: 2018-02-16

## 2018-02-15 RX ORDER — QUETIAPINE FUMARATE 200 MG/1
25 TABLET, FILM COATED ORAL AT BEDTIME
Qty: 0 | Refills: 0 | Status: DISCONTINUED | OUTPATIENT
Start: 2018-02-15 | End: 2018-02-16

## 2018-02-15 RX ORDER — CHOLECALCIFEROL (VITAMIN D3) 125 MCG
1000 CAPSULE ORAL DAILY
Qty: 0 | Refills: 0 | Status: DISCONTINUED | OUTPATIENT
Start: 2018-02-15 | End: 2018-02-16

## 2018-02-15 RX ORDER — CLONAZEPAM 1 MG
0 TABLET ORAL
Qty: 0 | Refills: 0 | COMMUNITY

## 2018-02-15 RX ORDER — SODIUM CHLORIDE 9 MG/ML
1000 INJECTION INTRAMUSCULAR; INTRAVENOUS; SUBCUTANEOUS ONCE
Qty: 0 | Refills: 0 | Status: COMPLETED | OUTPATIENT
Start: 2018-02-15 | End: 2018-02-15

## 2018-02-15 RX ORDER — DEXTROSE MONOHYDRATE, SODIUM CHLORIDE, AND POTASSIUM CHLORIDE 50; .745; 4.5 G/1000ML; G/1000ML; G/1000ML
1000 INJECTION, SOLUTION INTRAVENOUS
Qty: 0 | Refills: 0 | Status: DISCONTINUED | OUTPATIENT
Start: 2018-02-15 | End: 2018-02-18

## 2018-02-15 RX ORDER — CLONAZEPAM 1 MG
0.5 TABLET ORAL AT BEDTIME
Qty: 0 | Refills: 0 | Status: DISCONTINUED | OUTPATIENT
Start: 2018-02-15 | End: 2018-02-16

## 2018-02-15 RX ORDER — CARBIDOPA AND LEVODOPA 25; 100 MG/1; MG/1
2 TABLET ORAL AT BEDTIME
Qty: 0 | Refills: 0 | Status: DISCONTINUED | OUTPATIENT
Start: 2018-02-15 | End: 2018-02-16

## 2018-02-15 RX ADMIN — MORPHINE SULFATE 2 MILLIGRAM(S): 50 CAPSULE, EXTENDED RELEASE ORAL at 21:05

## 2018-02-15 RX ADMIN — CEFEPIME 100 MILLIGRAM(S): 1 INJECTION, POWDER, FOR SOLUTION INTRAMUSCULAR; INTRAVENOUS at 12:28

## 2018-02-15 RX ADMIN — SODIUM CHLORIDE 1000 MILLILITER(S): 9 INJECTION INTRAMUSCULAR; INTRAVENOUS; SUBCUTANEOUS at 11:26

## 2018-02-15 RX ADMIN — PROPOFOL 100 MILLIGRAM(S): 10 INJECTION, EMULSION INTRAVENOUS at 15:30

## 2018-02-15 RX ADMIN — MORPHINE SULFATE 2 MILLIGRAM(S): 50 CAPSULE, EXTENDED RELEASE ORAL at 11:26

## 2018-02-15 NOTE — CONSULT NOTE ADULT - SUBJECTIVE AND OBJECTIVE BOX
77F community ambulatory in assisted living facility presents c/o L hip dislocation. Pt is a poor historian 2/2 to parkisonian dementia. Per report from nursing home, pt was found the ground at her bedside. Pt presented to [place] ED on 2/14 w/ left hip dislocation and was discharge in a knee immobilizer and abduction pillow. She was successfully reduced in the ED under conscious sedation and placed in knee immobilizer and hip abduction pillow. Pt was found in ED w/ removal of KI and abduction pillow with her hip dislocated for the 3rd time in 24hrs.     Family history of age of death (Mother)  Family history of Parkinson disease (Father)  MEWS Score  Scoliosis  Neuropathy  Parkinson's disease  Scoliosis  Neuropathy  Parkinson's disease  No significant past surgical history  LEFT HIP DISLOCATION    PAST MEDICAL & SURGICAL HISTORY:  Scoliosis  Neuropathy  Parkinson's disease  No significant past surgical history    MEDICATIONS  (STANDING):  cefepime  IVPB 1000 milliGRAM(s) IV Intermittent every 12 hours  propofol Injectable 100 milliGRAM(s) IV Push once    Allergies  No Known Allergies                              13.7   15.6  )-----------( 304      ( 15 Feb 2018 09:41 )             41.3     15 Feb 2018 09:41    139    |  103    |  28     ----------------------------<  168    3.4     |  31     |  0.84     Ca    9.5        15 Feb 2018 09:41    TPro  7.3    /  Alb  3.9    /  TBili  0.5    /  DBili  x      /  AST  39     /  ALT  10     /  AlkPhos  79     15 Feb 2018 09:41  PT/INR - ( 15 Feb 2018 09:41 )   PT: 10.9 sec;   INR: 1.01 ratio    PTT - ( 15 Feb 2018 09:41 )  PTT:26.1 sec    Vital Signs Last 24 Hrs  T(C): 36.7 (02-15-18 @ 10:51), Max: 36.9 (02-15-18 @ 09:25)  T(F): 98.1 (02-15-18 @ 10:51), Max: 98.4 (02-15-18 @ 09:25)  HR: 91 (02-15-18 @ 15:12) (82 - 95)  BP: 162/80 (02-15-18 @ 15:12) (127/59 - 182/98)  RR: 16 (02-15-18 @ 15:12) (16 - 18)  SpO2: 100% (02-15-18 @ 15:12) (93% - 100%)    Physical Exam  General: NAD  Neurologic: No gross deficits, moving all 4s.  Head: NCAT without abrasions, lacerations, or ecchymosis to head, face, or scalp    LEFT LE: Healed Scar over hip. No open skin. Internally Rotated and shortened. No deformities or other signs of trauma at  knee, lower leg, ankle or foot. Full baseline painless ROM at ankle and toes with. Unable to actively SLR. SILT toes 1-5. 2+ DP/PT pulses with brisk cap refill distally. Compartments soft and compressible.     XR L Hip: posterior/superior hemiarthroplasty Dx    Procedure:  Pt was given conscious sedation w/ propofol by ED attending w/ appropriate airway monitoring  Hip was reduced and post-reduction XRs demonstrated adequate anatomic alignment  Pt was placed in knee immobilizer and abduction pillow  Pt NVI post procedure

## 2018-02-15 NOTE — H&P ADULT - NSHPLABSRESULTS_GEN_ALL_CORE
LABS: All Labs Reviewed:                        13.7   15.6  )-----------( 304      ( 15 Feb 2018 09:41 )             41.3     02-15    139  |  103  |  28<H>  ----------------------------<  168<H>  3.4<L>   |  31  |  0.84    Ca    9.5      15 Feb 2018 09:41    TPro  7.3  /  Alb  3.9  /  TBili  0.5  /  DBili  x   /  AST  39<H>  /  ALT  10<L>  /  AlkPhos  79  02-15    PT/INR - ( 15 Feb 2018 09:41 )   PT: 10.9 sec;   INR: 1.01 ratio         PTT - ( 15 Feb 2018 09:41 )  PTT:26.1 sec    Urinalysis (02.15.18 @ 12:25)    Glucose Qualitative, Urine: Negative mg/dL    Blood, Urine: Negative    pH Urine: 7.0    Color: Yellow    Urine Appearance: Clear    Bilirubin: Negative    Ketone - Urine: Moderate    Specific Gravity: 1.015    Protein, Urine: 30 mg/dL    Urobilinogen: Negative mg/dL    Nitrite: Negative    Leukocyte Esterase Concentration: Trace  Urine Microscopic-Add On (NC) (02.15.18 @ 12:25)    Red Blood Cell - Urine: 0-2 /HPF    White Blood Cell - Urine: 0-2          < from: CT Head No Cont (02.15.18 @ 11:04) >      IMPRESSION:       Chronic small vessel ischemic changes.    No parenchymal contusion, hemorrhage or extra-axial collection.    No CT evidence of an acute territorial infarction.    If symptoms persist, and additional imaging evaluation is needed,   follow-up MRI is suggested.      < end of copied text >  < from: Xray Chest 1 View- PORTABLE-Urgent (02.15.18 @ 13:19) >    IMPRESSION:    Chest: Clear lungs  Pelvis/left hip: Superior dislocation of left hip arthroplasty.    < end of copied text >

## 2018-02-15 NOTE — H&P ADULT - HISTORY OF PRESENT ILLNESS
77y female w/ PMH Parkinson's dementia, chronic low back pain due to lumbar stenosis/scoliosis, HTN.   Pt was seen in ED yesterday 2/14 after fall at [facility] and was found to have left hip dislocation;  hip was relocated and then pt was sent back to [facility] w/ knee immobilizer and abduction pillow.  Pt returns from [facility] today after being found on floor complaining of severe left hip pain and found to have dislocation again.     Pt initially had left femoral neck fracture s/p partial hemiarthroplasty in Nov. 2017.  She is noted to have gait instability and recurrent falls prior to and after procedure. 77y female w/ PMH Parkinson's dementia, chronic low back pain due to lumbar stenosis/scoliosis, HTN.   Pt was seen in ED yesterday 2/14 after fall at [facility] and was found to have left hip dislocation;  hip was relocated and then pt was sent back to [facility] w/ knee immobilizer and abduction pillow.  Pt returns from [facility] today after being found on floor complaining of severe left hip pain and found to have dislocation again.     Pt initially had left femoral neck fracture s/p partial hemiarthroplasty in Nov. 2017.  She is noted to have gait instability and recurrent falls prior to and after procedure.    Pt poor historian due to dementia therefore hx obtained from chart.  Pt denies pain, states she lives around her but uncertain where and denies taking meds.

## 2018-02-15 NOTE — ED ADULT NURSE NOTE - CHIEF COMPLAINT QUOTE
Left hip dislocation , pt was seen in ED yesterday , hip was reduced , left hip dislocation after standing [facility] at [facility]

## 2018-02-15 NOTE — ED ADULT NURSE NOTE - OBJECTIVE STATEMENT
Pt. c/o of left hip pain s/p unwitnessed fall. As per EMS, pt. was found on the ground, unknown down time unknown LOC, pt. is unable to give any information about fall incident. Pt. daughter at bedside, pt. has hx of parkinson's with dementia/confusion. Pt. denies HA, CP, SOB, numbness and tingling in left foot. Pt. was here yesterday for reduction of same left hip.

## 2018-02-15 NOTE — H&P ADULT - ASSESSMENT
77y female w/     *left hip hemiarthroplasty dislocation due to falls  (left femoral neck fx s/p partial hemiarthroplasty 11/2017)  - s/p relocation 2/14 and 2/15   - knee immobilizer and abduction pillow  - WBAT, posterior hip precautions  - Ortho f/u appreciated  - PT eval   - CT head in ED no acute changes     *Parkinson's dementia  - continue home meds     *leukocytosis  - possibly reactive, repeat cbc in am   - afebrile  - UA neg  - CXR neg  - pt given cefepime x 1 and 1LNS in ED and urine/blood cultures sent  - will hold off on further abx for now     *dvt px 77y female w/     *left hip hemiarthroplasty dislocation due to falls  (left femoral neck fx s/p partial hemiarthroplasty 11/2017)  - s/p relocation 2/14 and 2/15   - knee immobilizer and abduction pillow  - WBAT, posterior hip precautions  - Ortho f/u appreciated  - PT eval   - CT head in ED no acute changes     *Parkinson's dementia  - continue home meds     *leukocytosis  - possibly reactive, repeat cbc in am   - afebrile  - UA neg  - CXR neg  - pt given cefepime x 1 and 1LNS in ED and urine/blood cultures sent  - will hold off on further abx for now     *hypokalemia  - replete     *dvt px

## 2018-02-15 NOTE — ED ADULT NURSE REASSESSMENT NOTE - NS ED NURSE REASSESS COMMENT FT1
Pt A & O x 2, VSS, + pulse, sensation in left lower extremity, pt has no complaints, hip remains mobilized, bed rails up, will continue to monitor.
Po meds on hold pending conscious sedation.
Pt A & O x 2, VSS, + pulse, sensation in left lower extremity, pt has no complaints, hip remains mobilized, bed rails up, will continue to monitor.
Pt A&O x1, recvd pt from RN Socorro in bed with abd pillow in place, pending ortho consult
pending ortho hip reduction. will continue to monitor.
Upon returning to pt room for transport to unit, pt had self removed wedge between her legs & left knee immobilizer, left hip has swelling, pt denies pain, + pulse, motor & sensation, 1:1 placed for safety, MD Fernández made aware, Ortho MD Murray made aware.
Pt A & O x 2, VSS, + pulse, sensation in left lower extremity, pt has no complaints, hip remains mobilized, bed rails up, will continue to monitor.

## 2018-02-15 NOTE — CONSULT NOTE ADULT - SUBJECTIVE AND OBJECTIVE BOX
77F community ambulatory in assisted living facility presents c/o L hip dislocation. Pt is a poor historian 2/2 to parkisonian dementia. Per report from nursing home, pt was found the ground at her bedside. Pt presented to [facility] ED on 2/14 w/ left hip dislocation and was discharge in a knee immobilizer and abduction pillow. Denies numbness/tingling. Denies fever/chills. Denies pain/injury elsewhere. Pt follow-up w/ Dr Khan. No other orthopedic complaints at this time.     Family history of age of death (Mother)  Family history of Parkinson disease (Father)  MEWS Score  Scoliosis  Neuropathy  Parkinson's disease  Scoliosis  Neuropathy  Parkinson's disease  No significant past surgical history  LEFT HIP DISLOCATION    PAST MEDICAL & SURGICAL HISTORY:  Scoliosis  Neuropathy  Parkinson's disease  No significant past surgical history    MEDICATIONS  (STANDING):  cefepime  IVPB 1000 milliGRAM(s) IV Intermittent every 12 hours  propofol Injectable 100 milliGRAM(s) IV Push once    Allergies  No Known Allergies                              13.7   15.6  )-----------( 304      ( 15 Feb 2018 09:41 )             41.3     15 Feb 2018 09:41    139    |  103    |  28     ----------------------------<  168    3.4     |  31     |  0.84     Ca    9.5        15 Feb 2018 09:41    TPro  7.3    /  Alb  3.9    /  TBili  0.5    /  DBili  x      /  AST  39     /  ALT  10     /  AlkPhos  79     15 Feb 2018 09:41  PT/INR - ( 15 Feb 2018 09:41 )   PT: 10.9 sec;   INR: 1.01 ratio    PTT - ( 15 Feb 2018 09:41 )  PTT:26.1 sec    Vital Signs Last 24 Hrs  T(C): 36.7 (02-15-18 @ 10:51), Max: 36.9 (02-15-18 @ 09:25)  T(F): 98.1 (02-15-18 @ 10:51), Max: 98.4 (02-15-18 @ 09:25)  HR: 91 (02-15-18 @ 15:12) (82 - 95)  BP: 162/80 (02-15-18 @ 15:12) (127/59 - 182/98)  RR: 16 (02-15-18 @ 15:12) (16 - 18)  SpO2: 100% (02-15-18 @ 15:12) (93% - 100%)    Physical Exam  General: NAD  Neurologic: No gross deficits, moving all 4s.  Head: NCAT without abrasions, lacerations, or ecchymosis to head, face, or scalp    LEFT LE: Healed Scar over hip. No open skin. Internally Rotated and shortened. No deformities or other signs of trauma at  knee, lower leg, ankle or foot. Full baseline painless ROM at ankle and toes with. Unable to actively SLR. SILT toes 1-5. 2+ DP/PT pulses with brisk cap refill distally. Compartments soft and compressible.     XR L Hip: Anterior/superior hemiarthroplasty Dx    Procedure:  Pt was given conscious sedation w/ propofol by ED attending w/ appropriate airway monitoring  Hip was reduced and post-reduction XRs demonstrated adequate anatomic alignment  Pt was placed in knee immobilizer and abduction pillow  Pt NVI post procedure 77F community ambulatory in assisted living facility presents c/o L hip dislocation. Pt is a poor historian 2/2 to parkisonian dementia. Per report from nursing home, pt was found the ground at her bedside. Pt presented to [facility] ED on 2/14 w/ left hip dislocation and was discharge in a knee immobilizer and abduction pillow. Denies numbness/tingling. Denies fever/chills. Denies pain/injury elsewhere. Pt follow-up w/ Dr Khan. No other orthopedic complaints at this time.     Family history of age of death (Mother)  Family history of Parkinson disease (Father)  MEWS Score  Scoliosis  Neuropathy  Parkinson's disease  Scoliosis  Neuropathy  Parkinson's disease  No significant past surgical history  LEFT HIP DISLOCATION    PAST MEDICAL & SURGICAL HISTORY:  Scoliosis  Neuropathy  Parkinson's disease  No significant past surgical history    MEDICATIONS  (STANDING):  cefepime  IVPB 1000 milliGRAM(s) IV Intermittent every 12 hours  propofol Injectable 100 milliGRAM(s) IV Push once    Allergies  No Known Allergies                              13.7   15.6  )-----------( 304      ( 15 Feb 2018 09:41 )             41.3     15 Feb 2018 09:41    139    |  103    |  28     ----------------------------<  168    3.4     |  31     |  0.84     Ca    9.5        15 Feb 2018 09:41    TPro  7.3    /  Alb  3.9    /  TBili  0.5    /  DBili  x      /  AST  39     /  ALT  10     /  AlkPhos  79     15 Feb 2018 09:41  PT/INR - ( 15 Feb 2018 09:41 )   PT: 10.9 sec;   INR: 1.01 ratio    PTT - ( 15 Feb 2018 09:41 )  PTT:26.1 sec    Vital Signs Last 24 Hrs  T(C): 36.7 (02-15-18 @ 10:51), Max: 36.9 (02-15-18 @ 09:25)  T(F): 98.1 (02-15-18 @ 10:51), Max: 98.4 (02-15-18 @ 09:25)  HR: 91 (02-15-18 @ 15:12) (82 - 95)  BP: 162/80 (02-15-18 @ 15:12) (127/59 - 182/98)  RR: 16 (02-15-18 @ 15:12) (16 - 18)  SpO2: 100% (02-15-18 @ 15:12) (93% - 100%)    Physical Exam  General: NAD  Neurologic: No gross deficits, moving all 4s.  Head: NCAT without abrasions, lacerations, or ecchymosis to head, face, or scalp    LEFT LE: Healed Scar over hip. No open skin. Internally Rotated and shortened. No deformities or other signs of trauma at  knee, lower leg, ankle or foot. Full baseline painless ROM at ankle and toes with. Unable to actively SLR. SILT toes 1-5. 2+ DP/PT pulses with brisk cap refill distally. Compartments soft and compressible.     XR L Hip: posterior/superior hemiarthroplasty Dx    Procedure:  Pt was given conscious sedation w/ propofol by ED attending w/ appropriate airway monitoring  Hip was reduced and post-reduction XRs demonstrated adequate anatomic alignment  Pt was placed in knee immobilizer and abduction pillow  Pt NVI post procedure

## 2018-02-15 NOTE — ED PROVIDER NOTE - CARE PLAN
Principal Discharge DX:	Delirium  Secondary Diagnosis:	Hip dislocation, left  Secondary Diagnosis:	Avulsion fracture of hip

## 2018-02-15 NOTE — H&P ADULT - NSHPPHYSICALEXAM_GEN_ALL_CORE
Vital Signs Last 24 Hrs  T(C): 36.7 (15 Feb 2018 10:51), Max: 36.9 (15 Feb 2018 09:25)  T(F): 98.1 (15 Feb 2018 10:51), Max: 98.4 (15 Feb 2018 09:25)  HR: 91 (15 Feb 2018 15:12) (82 - 95)  BP: 162/80 (15 Feb 2018 15:12) (127/59 - 182/98)  BP(mean): --  RR: 16 (15 Feb 2018 15:12) (16 - 18)  SpO2: 100% (15 Feb 2018 15:12) (93% - 100%) Vital Signs Last 24 Hrs  T(C): 36.7 (15 Feb 2018 10:51), Max: 36.9 (15 Feb 2018 09:25)  T(F): 98.1 (15 Feb 2018 10:51), Max: 98.4 (15 Feb 2018 09:25)  HR: 91 (15 Feb 2018 15:12) (82 - 95)  BP: 162/80 (15 Feb 2018 15:12) (127/59 - 182/98)  BP(mean): --  RR: 16 (15 Feb 2018 15:12) (16 - 18)  SpO2: 100% (15 Feb 2018 15:12) (93% - 100%)      PHYSICAL EXAM:  General: pleasantly confused, lying comfortably in bed, NAD   Neuro: confused, doesn't follow commands  HEENT: NCAT, dry MM, poor dentition  Neck: Soft and supple  Respiratory: CTA b/l, no w/r/r  Cardiovascular: S1 and S2, RRR  Gastrointestinal: +BS, soft, NTND, no rebound/guarding  Extremities: No c/c/e;  left hip scar from surgery;  immobilizer in place   Vascular: 2+ peripheral pulses

## 2018-02-16 ENCOUNTER — RESULT REVIEW (OUTPATIENT)
Age: 78
End: 2018-02-16

## 2018-02-16 ENCOUNTER — TRANSCRIPTION ENCOUNTER (OUTPATIENT)
Age: 78
End: 2018-02-16

## 2018-02-16 LAB
ANION GAP SERPL CALC-SCNC: 6 MMOL/L — SIGNIFICANT CHANGE UP (ref 5–17)
ANION GAP SERPL CALC-SCNC: 8 MMOL/L — SIGNIFICANT CHANGE UP (ref 5–17)
BLD GP AB SCN SERPL QL: SIGNIFICANT CHANGE UP
BUN SERPL-MCNC: 26 MG/DL — HIGH (ref 7–23)
BUN SERPL-MCNC: 29 MG/DL — HIGH (ref 7–23)
CALCIUM SERPL-MCNC: 8.5 MG/DL — SIGNIFICANT CHANGE UP (ref 8.5–10.1)
CALCIUM SERPL-MCNC: 8.9 MG/DL — SIGNIFICANT CHANGE UP (ref 8.5–10.1)
CHLORIDE SERPL-SCNC: 108 MMOL/L — SIGNIFICANT CHANGE UP (ref 96–108)
CHLORIDE SERPL-SCNC: 110 MMOL/L — HIGH (ref 96–108)
CO2 SERPL-SCNC: 25 MMOL/L — SIGNIFICANT CHANGE UP (ref 22–31)
CO2 SERPL-SCNC: 28 MMOL/L — SIGNIFICANT CHANGE UP (ref 22–31)
CREAT SERPL-MCNC: 0.6 MG/DL — SIGNIFICANT CHANGE UP (ref 0.5–1.3)
CREAT SERPL-MCNC: 0.64 MG/DL — SIGNIFICANT CHANGE UP (ref 0.5–1.3)
CRP SERPL-MCNC: 3.7 MG/DL — HIGH (ref 0–0.4)
GLUCOSE SERPL-MCNC: 104 MG/DL — HIGH (ref 70–99)
GLUCOSE SERPL-MCNC: 96 MG/DL — SIGNIFICANT CHANGE UP (ref 70–99)
HCT VFR BLD CALC: 33.8 % — LOW (ref 34.5–45)
HCT VFR BLD CALC: 37.9 % — SIGNIFICANT CHANGE UP (ref 34.5–45)
HGB BLD-MCNC: 11 G/DL — LOW (ref 11.5–15.5)
HGB BLD-MCNC: 12.7 G/DL — SIGNIFICANT CHANGE UP (ref 11.5–15.5)
INR BLD: 1.09 RATIO — SIGNIFICANT CHANGE UP (ref 0.88–1.16)
MCHC RBC-ENTMCNC: 29.7 PG — SIGNIFICANT CHANGE UP (ref 27–34)
MCHC RBC-ENTMCNC: 30.3 PG — SIGNIFICANT CHANGE UP (ref 27–34)
MCHC RBC-ENTMCNC: 32.5 GM/DL — SIGNIFICANT CHANGE UP (ref 32–36)
MCHC RBC-ENTMCNC: 33.6 GM/DL — SIGNIFICANT CHANGE UP (ref 32–36)
MCV RBC AUTO: 90.1 FL — SIGNIFICANT CHANGE UP (ref 80–100)
MCV RBC AUTO: 91.2 FL — SIGNIFICANT CHANGE UP (ref 80–100)
PLATELET # BLD AUTO: 244 K/UL — SIGNIFICANT CHANGE UP (ref 150–400)
PLATELET # BLD AUTO: 291 K/UL — SIGNIFICANT CHANGE UP (ref 150–400)
POTASSIUM SERPL-MCNC: 3.3 MMOL/L — LOW (ref 3.5–5.3)
POTASSIUM SERPL-MCNC: 4 MMOL/L — SIGNIFICANT CHANGE UP (ref 3.5–5.3)
POTASSIUM SERPL-SCNC: 3.3 MMOL/L — LOW (ref 3.5–5.3)
POTASSIUM SERPL-SCNC: 4 MMOL/L — SIGNIFICANT CHANGE UP (ref 3.5–5.3)
PROTHROM AB SERPL-ACNC: 11.8 SEC — SIGNIFICANT CHANGE UP (ref 9.8–12.7)
RBC # BLD: 3.7 M/UL — LOW (ref 3.8–5.2)
RBC # BLD: 4.21 M/UL — SIGNIFICANT CHANGE UP (ref 3.8–5.2)
RBC # FLD: 12.1 % — SIGNIFICANT CHANGE UP (ref 10.3–14.5)
RBC # FLD: 12.5 % — SIGNIFICANT CHANGE UP (ref 10.3–14.5)
SODIUM SERPL-SCNC: 142 MMOL/L — SIGNIFICANT CHANGE UP (ref 135–145)
SODIUM SERPL-SCNC: 143 MMOL/L — SIGNIFICANT CHANGE UP (ref 135–145)
TYPE + AB SCN PNL BLD: SIGNIFICANT CHANGE UP
WBC # BLD: 12 K/UL — HIGH (ref 3.8–10.5)
WBC # BLD: 13.9 K/UL — HIGH (ref 3.8–10.5)
WBC # FLD AUTO: 12 K/UL — HIGH (ref 3.8–10.5)
WBC # FLD AUTO: 13.9 K/UL — HIGH (ref 3.8–10.5)

## 2018-02-16 PROCEDURE — 93010 ELECTROCARDIOGRAM REPORT: CPT

## 2018-02-16 PROCEDURE — 73501 X-RAY EXAM HIP UNI 1 VIEW: CPT | Mod: 26,LT

## 2018-02-16 PROCEDURE — 88305 TISSUE EXAM BY PATHOLOGIST: CPT | Mod: 26

## 2018-02-16 RX ORDER — CARBIDOPA AND LEVODOPA 25; 100 MG/1; MG/1
2 TABLET ORAL DAILY
Qty: 0 | Refills: 0 | Status: DISCONTINUED | OUTPATIENT
Start: 2018-02-16 | End: 2018-02-17

## 2018-02-16 RX ORDER — ACETAMINOPHEN 500 MG
650 TABLET ORAL EVERY 6 HOURS
Qty: 0 | Refills: 0 | Status: DISCONTINUED | OUTPATIENT
Start: 2018-02-16 | End: 2018-02-19

## 2018-02-16 RX ORDER — ASCORBIC ACID 60 MG
500 TABLET,CHEWABLE ORAL
Qty: 0 | Refills: 0 | Status: DISCONTINUED | OUTPATIENT
Start: 2018-02-16 | End: 2018-02-19

## 2018-02-16 RX ORDER — CALCIUM CARBONATE 500(1250)
1 TABLET ORAL DAILY
Qty: 0 | Refills: 0 | Status: DISCONTINUED | OUTPATIENT
Start: 2018-02-16 | End: 2018-02-19

## 2018-02-16 RX ORDER — PANTOPRAZOLE SODIUM 20 MG/1
40 TABLET, DELAYED RELEASE ORAL DAILY
Qty: 0 | Refills: 0 | Status: DISCONTINUED | OUTPATIENT
Start: 2018-02-16 | End: 2018-02-19

## 2018-02-16 RX ORDER — OXYCODONE HYDROCHLORIDE 5 MG/1
5 TABLET ORAL EVERY 4 HOURS
Qty: 0 | Refills: 0 | Status: DISCONTINUED | OUTPATIENT
Start: 2018-02-16 | End: 2018-02-16

## 2018-02-16 RX ORDER — CARBIDOPA AND LEVODOPA 25; 100 MG/1; MG/1
2 TABLET ORAL AT BEDTIME
Qty: 0 | Refills: 0 | Status: DISCONTINUED | OUTPATIENT
Start: 2018-02-16 | End: 2018-02-19

## 2018-02-16 RX ORDER — CARBIDOPA AND LEVODOPA 25; 100 MG/1; MG/1
1 TABLET ORAL THREE TIMES A DAY
Qty: 0 | Refills: 0 | Status: DISCONTINUED | OUTPATIENT
Start: 2018-02-16 | End: 2018-02-19

## 2018-02-16 RX ORDER — ONDANSETRON 8 MG/1
4 TABLET, FILM COATED ORAL EVERY 6 HOURS
Qty: 0 | Refills: 0 | Status: DISCONTINUED | OUTPATIENT
Start: 2018-02-16 | End: 2018-02-19

## 2018-02-16 RX ORDER — DIPHENHYDRAMINE HCL 50 MG
50 CAPSULE ORAL EVERY 4 HOURS
Qty: 0 | Refills: 0 | Status: DISCONTINUED | OUTPATIENT
Start: 2018-02-16 | End: 2018-02-19

## 2018-02-16 RX ORDER — QUETIAPINE FUMARATE 200 MG/1
25 TABLET, FILM COATED ORAL AT BEDTIME
Qty: 0 | Refills: 0 | Status: DISCONTINUED | OUTPATIENT
Start: 2018-02-16 | End: 2018-02-19

## 2018-02-16 RX ORDER — POLYETHYLENE GLYCOL 3350 17 G/17G
17 POWDER, FOR SOLUTION ORAL DAILY
Qty: 0 | Refills: 0 | Status: DISCONTINUED | OUTPATIENT
Start: 2018-02-16 | End: 2018-02-19

## 2018-02-16 RX ORDER — SODIUM CHLORIDE 9 MG/ML
1000 INJECTION, SOLUTION INTRAVENOUS
Qty: 0 | Refills: 0 | Status: DISCONTINUED | OUTPATIENT
Start: 2018-02-16 | End: 2018-02-16

## 2018-02-16 RX ORDER — FOLIC ACID 0.8 MG
1 TABLET ORAL DAILY
Qty: 0 | Refills: 0 | Status: DISCONTINUED | OUTPATIENT
Start: 2018-02-16 | End: 2018-02-19

## 2018-02-16 RX ORDER — FENTANYL CITRATE 50 UG/ML
50 INJECTION INTRAVENOUS
Qty: 0 | Refills: 0 | Status: DISCONTINUED | OUTPATIENT
Start: 2018-02-16 | End: 2018-02-16

## 2018-02-16 RX ORDER — CHOLECALCIFEROL (VITAMIN D3) 125 MCG
1000 CAPSULE ORAL DAILY
Qty: 0 | Refills: 0 | Status: DISCONTINUED | OUTPATIENT
Start: 2018-02-16 | End: 2018-02-19

## 2018-02-16 RX ORDER — OXYCODONE HYDROCHLORIDE 5 MG/1
5 TABLET ORAL EVERY 4 HOURS
Qty: 0 | Refills: 0 | Status: DISCONTINUED | OUTPATIENT
Start: 2018-02-16 | End: 2018-02-19

## 2018-02-16 RX ORDER — SODIUM CHLORIDE 9 MG/ML
1000 INJECTION INTRAMUSCULAR; INTRAVENOUS; SUBCUTANEOUS
Qty: 0 | Refills: 0 | Status: DISCONTINUED | OUTPATIENT
Start: 2018-02-16 | End: 2018-02-18

## 2018-02-16 RX ORDER — DULOXETINE HYDROCHLORIDE 30 MG/1
30 CAPSULE, DELAYED RELEASE ORAL DAILY
Qty: 0 | Refills: 0 | Status: DISCONTINUED | OUTPATIENT
Start: 2018-02-16 | End: 2018-02-19

## 2018-02-16 RX ORDER — CEFAZOLIN SODIUM 1 G
2000 VIAL (EA) INJECTION EVERY 8 HOURS
Qty: 0 | Refills: 0 | Status: COMPLETED | OUTPATIENT
Start: 2018-02-16 | End: 2018-02-17

## 2018-02-16 RX ORDER — CEFTRIAXONE 500 MG/1
1000 INJECTION, POWDER, FOR SOLUTION INTRAMUSCULAR; INTRAVENOUS EVERY 24 HOURS
Qty: 0 | Refills: 0 | Status: DISCONTINUED | OUTPATIENT
Start: 2018-02-16 | End: 2018-02-19

## 2018-02-16 RX ORDER — GABAPENTIN 400 MG/1
600 CAPSULE ORAL THREE TIMES A DAY
Qty: 0 | Refills: 0 | Status: DISCONTINUED | OUTPATIENT
Start: 2018-02-16 | End: 2018-02-19

## 2018-02-16 RX ORDER — CEFTRIAXONE 500 MG/1
INJECTION, POWDER, FOR SOLUTION INTRAMUSCULAR; INTRAVENOUS
Qty: 0 | Refills: 0 | Status: DISCONTINUED | OUTPATIENT
Start: 2018-02-16 | End: 2018-02-16

## 2018-02-16 RX ORDER — ZALEPLON 10 MG
5 CAPSULE ORAL AT BEDTIME
Qty: 0 | Refills: 0 | Status: DISCONTINUED | OUTPATIENT
Start: 2018-02-16 | End: 2018-02-19

## 2018-02-16 RX ORDER — FERROUS SULFATE 325(65) MG
325 TABLET ORAL
Qty: 0 | Refills: 0 | Status: DISCONTINUED | OUTPATIENT
Start: 2018-02-16 | End: 2018-02-19

## 2018-02-16 RX ORDER — SENNA PLUS 8.6 MG/1
2 TABLET ORAL AT BEDTIME
Qty: 0 | Refills: 0 | Status: DISCONTINUED | OUTPATIENT
Start: 2018-02-16 | End: 2018-02-19

## 2018-02-16 RX ORDER — ASPIRIN/CALCIUM CARB/MAGNESIUM 324 MG
325 TABLET ORAL
Qty: 0 | Refills: 0 | Status: DISCONTINUED | OUTPATIENT
Start: 2018-02-17 | End: 2018-02-19

## 2018-02-16 RX ORDER — OXYCODONE HYDROCHLORIDE 5 MG/1
10 TABLET ORAL EVERY 4 HOURS
Qty: 0 | Refills: 0 | Status: DISCONTINUED | OUTPATIENT
Start: 2018-02-16 | End: 2018-02-19

## 2018-02-16 RX ORDER — HYDROMORPHONE HYDROCHLORIDE 2 MG/ML
1 INJECTION INTRAMUSCULAR; INTRAVENOUS; SUBCUTANEOUS
Qty: 0 | Refills: 0 | Status: DISCONTINUED | OUTPATIENT
Start: 2018-02-16 | End: 2018-02-19

## 2018-02-16 RX ORDER — DOCUSATE SODIUM 100 MG
100 CAPSULE ORAL THREE TIMES A DAY
Qty: 0 | Refills: 0 | Status: DISCONTINUED | OUTPATIENT
Start: 2018-02-16 | End: 2018-02-19

## 2018-02-16 RX ORDER — LABETALOL HCL 100 MG
5 TABLET ORAL ONCE
Qty: 0 | Refills: 0 | Status: COMPLETED | OUTPATIENT
Start: 2018-02-16 | End: 2018-02-16

## 2018-02-16 RX ORDER — SODIUM CHLORIDE 9 MG/ML
1000 INJECTION, SOLUTION INTRAVENOUS
Qty: 0 | Refills: 0 | Status: DISCONTINUED | OUTPATIENT
Start: 2018-02-16 | End: 2018-02-18

## 2018-02-16 RX ORDER — POTASSIUM CHLORIDE 20 MEQ
20 PACKET (EA) ORAL ONCE
Qty: 0 | Refills: 0 | Status: DISCONTINUED | OUTPATIENT
Start: 2018-02-16 | End: 2018-02-16

## 2018-02-16 RX ORDER — CEFTRIAXONE 500 MG/1
1000 INJECTION, POWDER, FOR SOLUTION INTRAMUSCULAR; INTRAVENOUS ONCE
Qty: 0 | Refills: 0 | Status: COMPLETED | OUTPATIENT
Start: 2018-02-16 | End: 2018-02-16

## 2018-02-16 RX ORDER — CLONAZEPAM 1 MG
0.5 TABLET ORAL AT BEDTIME
Qty: 0 | Refills: 0 | Status: DISCONTINUED | OUTPATIENT
Start: 2018-02-16 | End: 2018-02-19

## 2018-02-16 RX ORDER — POTASSIUM CHLORIDE 20 MEQ
10 PACKET (EA) ORAL
Qty: 0 | Refills: 0 | Status: COMPLETED | OUTPATIENT
Start: 2018-02-16 | End: 2018-02-16

## 2018-02-16 RX ORDER — ONDANSETRON 8 MG/1
4 TABLET, FILM COATED ORAL ONCE
Qty: 0 | Refills: 0 | Status: DISCONTINUED | OUTPATIENT
Start: 2018-02-16 | End: 2018-02-16

## 2018-02-16 RX ORDER — RASAGILINE 0.5 MG/1
1 TABLET ORAL DAILY
Qty: 0 | Refills: 0 | Status: DISCONTINUED | OUTPATIENT
Start: 2018-02-16 | End: 2018-02-19

## 2018-02-16 RX ORDER — BENZOCAINE AND MENTHOL 5; 1 G/100ML; G/100ML
1 LIQUID ORAL
Qty: 0 | Refills: 0 | Status: DISCONTINUED | OUTPATIENT
Start: 2018-02-16 | End: 2018-02-19

## 2018-02-16 RX ORDER — CARBIDOPA AND LEVODOPA 25; 100 MG/1; MG/1
1.5 TABLET ORAL DAILY
Qty: 0 | Refills: 0 | Status: DISCONTINUED | OUTPATIENT
Start: 2018-02-16 | End: 2018-02-17

## 2018-02-16 RX ADMIN — GABAPENTIN 600 MILLIGRAM(S): 400 CAPSULE ORAL at 05:02

## 2018-02-16 RX ADMIN — CARBIDOPA AND LEVODOPA 2 TABLET(S): 25; 100 TABLET ORAL at 05:03

## 2018-02-16 RX ADMIN — SODIUM CHLORIDE 100 MILLILITER(S): 9 INJECTION, SOLUTION INTRAVENOUS at 19:21

## 2018-02-16 RX ADMIN — CARBIDOPA AND LEVODOPA 1.5 TABLET(S): 25; 100 TABLET ORAL at 08:15

## 2018-02-16 RX ADMIN — CARBIDOPA AND LEVODOPA 2 TABLET(S): 25; 100 TABLET ORAL at 21:49

## 2018-02-16 RX ADMIN — SODIUM CHLORIDE 75 MILLILITER(S): 9 INJECTION, SOLUTION INTRAVENOUS at 22:27

## 2018-02-16 RX ADMIN — Medication 100 MILLIGRAM(S): at 23:46

## 2018-02-16 RX ADMIN — CEFTRIAXONE 1000 MILLIGRAM(S): 500 INJECTION, POWDER, FOR SOLUTION INTRAMUSCULAR; INTRAVENOUS at 14:39

## 2018-02-16 RX ADMIN — Medication 5 MILLIGRAM(S): at 18:43

## 2018-02-16 RX ADMIN — CARBIDOPA AND LEVODOPA 1 TABLET(S): 25; 100 TABLET ORAL at 13:19

## 2018-02-16 RX ADMIN — Medication 100 MILLIEQUIVALENT(S): at 11:24

## 2018-02-16 RX ADMIN — RASAGILINE 1 MILLIGRAM(S): 0.5 TABLET ORAL at 11:21

## 2018-02-16 RX ADMIN — CARBIDOPA AND LEVODOPA 1 TABLET(S): 25; 100 TABLET ORAL at 14:39

## 2018-02-16 RX ADMIN — Medication 100 MILLIEQUIVALENT(S): at 09:25

## 2018-02-16 RX ADMIN — DEXTROSE MONOHYDRATE, SODIUM CHLORIDE, AND POTASSIUM CHLORIDE 60 MILLILITER(S): 50; .745; 4.5 INJECTION, SOLUTION INTRAVENOUS at 02:09

## 2018-02-16 RX ADMIN — GABAPENTIN 600 MILLIGRAM(S): 400 CAPSULE ORAL at 13:19

## 2018-02-16 RX ADMIN — Medication 0.5 MILLIGRAM(S): at 03:14

## 2018-02-16 NOTE — DISCHARGE NOTE ADULT - PATIENT PORTAL LINK FT
You can access the [facility] Patient Portal, offered by [facility], by registering with the following website: [email or web address]

## 2018-02-16 NOTE — DISCHARGE NOTE ADULT - MEDICATION SUMMARY - MEDICATIONS TO TAKE
I will START or STAY ON the medications listed below when I get home from the hospital:    oxyCODONE 5 mg oral tablet  -- 1 tab(s) by mouth every 4 hours, As needed, Moderate Pain  -- Indication: For PRN Pain    aspirin 325 mg oral delayed release tablet  -- 1 tab(s) by mouth 2 times a day for 30 days total  -- Indication: For DVT ppx    calcium (as carbonate) 600 mg oral tablet  -- 2 tab(s) by mouth once a day  -- Indication: For Per PMD    clonazePAM 0.5 mg oral tablet  -- 0.5 milligram(s) by mouth once a day (at bedtime)  -- Indication: For Per PMD    gabapentin 600 mg oral tablet  -- 1 tab(s) by mouth 3 times a day  -- Indication: For Per PMD    DULoxetine 30 mg oral delayed release capsule  -- 1 cap(s) by mouth once a day  -- Indication: For Per PMD    carbidopa-levodopa 25 mg-100 mg oral tablet, extended release  -- 2 tab(s) by mouth once a day (at bedtime)   -- Indication: For Per PMD    carbidopa-levodopa 25 mg-100 mg oral tablet  -- 1.5 tab(s) by mouth once a day 9 am  -- Indication: For Per PMD    carbidopa-levodopa 25 mg-100 mg oral tablet  -- 1 tab(s) by mouth 3 times a day 1 pm, 3 pm, and 6 pm   -- Indication: For Per PMD    carbidopa-levodopa 25 mg-100 mg oral tablet  -- 2 tab(s) by mouth once a day 6 am  -- Indication: For Per PMD    rasagiline 1 mg oral tablet  -- 1 tab(s) by mouth once a day  -- Indication: For Per PMD    SEROquel 25 mg oral tablet  -- 25 milligram(s) by mouth once a day (at bedtime)  -- Indication: For Per PMD    ferrous sulfate 325 mg (65 mg elemental iron) oral tablet  -- 1 tab(s) by mouth 2 times a day  -- Indication: For Per PMD    senna oral tablet  -- 2 tab(s) by mouth once a day (at bedtime)  -- Indication: For Per PMD    Multiple Vitamins oral tablet  -- 1 tab(s) by mouth once a day  -- Indication: For Per PMD    ascorbic acid 500 mg oral tablet  -- 1 tab(s) by mouth 2 times a day  -- Indication: For Per PMD    Vitamin D3 1000 intl units oral tablet  -- 2 tab(s) by mouth once a day  -- Indication: For Per PMD

## 2018-02-16 NOTE — DISCHARGE NOTE ADULT - MEDICATION SUMMARY - MEDICATIONS TO STOP TAKING
I will STOP taking the medications listed below when I get home from the hospital:    enoxaparin  -- 40 milligram(s) subcutaneous once a day X 4 weeks

## 2018-02-16 NOTE — DISCHARGE NOTE ADULT - CARE PLAN
Principal Discharge DX:	Status post hip hemiarthroplasty  Goal:	Return to baseline ADLs  Assessment and plan of treatment:	Discharge Instructions Total Hip Arthroplasty    1. Diet: Resume previous diet    2. Activity: WBAT. Rolling walker. Posterior Hip Dislocation Precautions. Abduction Pillow while in bed and Chair. Daily Physical Therapy. Knee immobilizer left lower extremity at all times for 2 months    3. Call with: fever over 101, wound redness, drainage or open area, calf pain/calf swelling.    4. Wound Care: Remove old and Place new Aquacel bandage to hip wound every 7days. Remove Sutures/Staples Post Op Day #14 so long as wound is healed, no drainage or open area. OK to Shower with Aquacel.  Avoid direct water beating on bandage.     5. DVT PE Prophylaxis: see med rec for details/dosing.  -Continue Pepcid while on Anticoagulant    6. Labs: Check H&H weekly while on Anticoagulation    7. Follow Up: Orthopedics, 10-14 days in office. Call to schedule. If going home, eRX sent to your pharmacy for .

## 2018-02-16 NOTE — BRIEF OPERATIVE NOTE - PROCEDURE
<<-----Click on this checkbox to enter Procedure Total hip arthroplasty  02/16/2018    Active  AMEGAS

## 2018-02-16 NOTE — CHART NOTE - NSCHARTNOTEFT_GEN_A_CORE
Patient is seen and examined. Chart reviewed.  Labs reviewed  EKG- sinus, LVH    Patient is medically optimized for OR

## 2018-02-16 NOTE — PROGRESS NOTE ADULT - SUBJECTIVE AND OBJECTIVE BOX
Pt S/E at bedside, no acute events overnight, pain controlled. Resting comfortably.     Vital Signs Last 24 Hrs  T(C): 36.8 (16 Feb 2018 04:00), Max: 36.9 (15 Feb 2018 09:25)  T(F): 98.3 (16 Feb 2018 04:00), Max: 98.4 (15 Feb 2018 09:25)  HR: 105 (16 Feb 2018 04:00) (87 - 110)  BP: 141/59 (16 Feb 2018 04:00) (127/59 - 170/89)  RR: 16 (16 Feb 2018 04:00) (16 - 18)  SpO2: 94% (16 Feb 2018 04:00) (93% - 100%)    Gen: NAD    Left Lower Extremity:  Skin intact  Leg short and internally rotated  Pt unable to follow commands but grossly moving all extermities  +DP/PT Pulses  Compartments soft  No calf TTP B/L

## 2018-02-16 NOTE — DISCHARGE NOTE ADULT - CARE PROVIDER_API CALL
Gallo South (MD), Orthopaedic Surgery  [address] [place] [address]  Phone: [phone]  Fax: [phone]

## 2018-02-16 NOTE — PROGRESS NOTE ADULT - SUBJECTIVE AND OBJECTIVE BOX
PCP- DR Nelson    CC- Patient is a 77y old  Female who presents with a chief complaint of left hip pain (15 Feb 2018 16:21)    HPI:  77y female w/ PMH Parkinson's dementia, chronic low back pain due to lumbar stenosis/scoliosis, HTN.   Pt was seen in ED yesterday  after fall at [facility] and was found to have left hip dislocation;  hip was relocated and then pt was sent back to [facility] w/ knee immobilizer and abduction pillow.  Pt returns from [facility] today after being found on floor complaining of severe left hip pain and found to have dislocation again.     Pt initially had left femoral neck fracture s/p partial hemiarthroplasty in 2017.  She is noted to have gait instability and recurrent falls prior to and after procedure.    Pt poor historian due to dementia therefore hx obtained from chart.  Pt denies pain, states she lives around her but uncertain where and denies taking meds. (15 Feb 2018 16:21)    18- for OR today  Review of system- All 10 systems reviewed and is as per HPI otherwise negative.     T(C): 36.8 (18 @ 04:00), Max: 36.8 (18 @ 04:00)  HR: 105 (18 @ 04:00) (87 - 110)  BP: 141/59 (18 @ 04:00) (127/59 - 170/89)  RR: 16 (18 @ 04:00) (16 - 18)  SpO2: 94% (18 @ 04:00) (94% - 100%)  Wt(kg): --    LABS:                        12.7   13.9  )-----------( 291      ( 2018 05:22 )             37.9     142  |  108  |  26<H>  ----------------------------<  104<H>  3.3<L>   |  28  |  0.60  Ca    8.9      2018 05:22  TPro  7.3  /  Alb  3.9  /  TBili  0.5  /  DBili  x   /  AST  39<H>  /  ALT  10<L>  /  AlkPhos  79  02-15  PT/INR - ( 2018 07:19 )   PT: 11.8 sec;   INR: 1.09 ratio    PTT - ( 15 Feb 2018 09:41 )  PTT:26.1 sec    Urinalysis Basic - ( 15 Feb 2018 12:25 )  Color: Yellow / Appearance: Clear / S.015 / pH: x  Gluc: x / Ketone: Moderate  / Bili: Negative / Urobili: Negative mg/dL   Blood: x / Protein: 30 mg/dL / Nitrite: Negative   Leuk Esterase: Trace / RBC: 0-2 /HPF / WBC 0-2   Sq Epi: x / Non Sq Epi: x / Bacteria: x    RADIOLOGY & ADDITIONAL TESTS:  EXAM:  XR HIP INCL PELV 2-3V LT                        PROCEDURE DATE:  02/15/2018    INTERPRETATION:  CLINICAL INFORMATION: Post reduction.    TECHNIQUE: Single frontal radiograph of the left hip was performed.   COMPARISON: February 15,2018 at 11:09 AM.    IMPRESSION:  Although evaluation is suboptimal due to the lack of orthogonal   radiograph, previously seen dislocated left hip appears reduced. Left hip   hemiarthroplasty with unchanged radiopaque fragment adjacent to the   lesser trochanter.    PHYSICAL EXAM:  GENERAL: NAD, well-groomed, well-developed  HEAD:  Atraumatic, Normocephalic  EYES: EOMI, PERRLA, conjunctiva and sclera clear  HEENT: Moist mucous membranes  NECK: Supple, No JVD  NERVOUS SYSTEM:  Alert & Oriented X3  CHEST/LUNG: Clear to auscultation bilaterally; No rales, rhonchi, wheezing, or rubs  HEART: Regular rate and rhythm; No murmurs, rubs, or gallops  ABDOMEN: Soft, Nontender, Nondistended; Bowel sounds present  GENITOURINARY- Voiding, no palpable bladder  EXTREMITIES:  2+ Peripheral Pulses, No clubbing, cyanosis, or edema  MUSCULOSKELTAL- LLE limited ROM  SKIN-no rash, no lesion  CNS- alert, oriented X3, non focal       acetaminophen   Tablet 650 milliGRAM(s) Oral every 6 hours PRN  calcium carbonate 1250 mG (OsCal) 1 Tablet(s) Oral daily  carbidopa/levodopa  25/100 2 Tablet(s) Oral daily  carbidopa/levodopa  25/100 1 Tablet(s) Oral three times a day  carbidopa/levodopa  25/100 1.5 Tablet(s) Oral daily  carbidopa/levodopa CR 25/100 2 Tablet(s) Oral at bedtime  cholecalciferol 1000 Unit(s) Oral daily  clonazePAM Tablet 0.5 milliGRAM(s) Oral at bedtime  DULoxetine 30 milliGRAM(s) Oral daily  gabapentin 600 milliGRAM(s) Oral three times a day  potassium chloride  10 mEq/100 mL IVPB 10 milliEquivalent(s) IV Intermittent every 1 hour  QUEtiapine 25 milliGRAM(s) Oral at bedtime  rasagiline Tablet 1 milliGRAM(s) Oral daily  sodium chloride 0.9% with potassium chloride 20 mEq/L 1000 milliLiter(s) IV Continuous <Continuous>  sodium chloride 0.9%. 1000 milliLiter(s) IV Continuous <Continuous>    Assessment/Plan  #left hip hemiarthroplasty dislocation due to falls  (left femoral neck fx s/p partial hemiarthroplasty 2017)  Ortho eval appreciated  NPO for OR today  Pain meds prn  Bedrest  Pt is medically optimized for OR    #Parkinson's dementia  continue home meds     #Leukocytosis likely reactive    #Hypokalemia- replace    #Dispo- medically optimized for OR

## 2018-02-16 NOTE — DISCHARGE NOTE ADULT - PLAN OF CARE
Return to baseline ADLs Discharge Instructions Total Hip Arthroplasty    1. Diet: Resume previous diet    2. Activity: WBAT. Rolling walker. Posterior Hip Dislocation Precautions. Abduction Pillow while in bed and Chair. Daily Physical Therapy. Knee immobilizer left lower extremity at all times for 2 months    3. Call with: fever over 101, wound redness, drainage or open area, calf pain/calf swelling.    4. Wound Care: Remove old and Place new Aquacel bandage to hip wound every 7days. Remove Sutures/Staples Post Op Day #14 so long as wound is healed, no drainage or open area. OK to Shower with Aquacel.  Avoid direct water beating on bandage.     5. DVT PE Prophylaxis: see med rec for details/dosing.  -Continue Pepcid while on Anticoagulant    6. Labs: Check H&H weekly while on Anticoagulation    7. Follow Up: Orthopedics, 10-14 days in office. Call to schedule. If going home, eRX sent to your pharmacy for .

## 2018-02-16 NOTE — DISCHARGE NOTE ADULT - HOSPITAL COURSE
77y female w/ PMH Parkinson's dementia, chronic low back pain due to lumbar stenosis/scoliosis, HTN who presented to the ER with left hip pain. She was seen in the ED a day prior to admission on 2/14 after fall at [facility] and was found to have left hip dislocation;  hip was relocated and then pt was sent back to [facility] w/ knee immobilizer and abduction pillow.  Pt returned from [facility] after being found on floor complaining of severe left hip pain and found to have dislocation again.   Pt initially had left femoral neck fracture s/p partial hemiarthroplasty in Nov. 2017.  She was noted to have gait instability and recurrent falls prior to and after procedure.   Pt poor historian due to dementia therefore hx obtained from chart. She was admitted to the hospitalist service. Underwent conversion of her left hip hemiarthroplasty to total hip arthroplasty with Dr. South.  She tolerated the procedure well. She required prbcs post procedure after which her hemoglobin remained stable.  She was seen by physical therapy and will be discharged to rehab today.    For physical exam please see progress note from 2/19/18    LABS:                        9.6    9.7   )-----------( 210      ( 19 Feb 2018 07:10 )             28.7     02-19    140  |  106  |  19  ----------------------------<  98  3.5   |  30  |  0.45<L>    Ca    8.2<L>      19 Feb 2018 07:10    Xray Hip w/ Pelvis 1 View, Left (02.16.18 @ 19:11) >  IMPRESSION:    Status post revision of left hip arthroplasty.    CT Head No Cont (02.15.18 @ 11:04) >  IMPRESSION:     Chronic small vessel ischemic changes.  No parenchymal contusion, hemorrhage or extra-axial collection.  No CT evidence of an acute territorial infarction.  If symptoms persist, and additional imaging evaluation is needed,   follow-up MRI is suggested.            1. Left hip hemiarthroplasty dislocation due to falls  -s/p  conversion of left hip hemiarthroplasty to total hip arthroplasty with Dr. South.   -WBAT with posterior hip precautions per ortho  -Abduction while supine/seated  -left knee immobilizer at all times for 2 months  2. Acute blood loss anemia:  3. Parkinson's dementia  4. Leukocytosis-resolved  5. Hypokalemia-resolved    time taken for dc 75 min  will d/w daughter

## 2018-02-16 NOTE — PROGRESS NOTE ADULT - SUBJECTIVE AND OBJECTIVE BOX
Post-Op Check:    Pt S/E at bedside, tolerated procedure well, pain controlled    Vital Signs Last 24 Hrs  T(C): 36.7 (16 Feb 2018 20:22), Max: 36.8 (16 Feb 2018 04:00)  T(F): 98 (16 Feb 2018 20:22), Max: 98.3 (16 Feb 2018 04:00)  HR: 92 (16 Feb 2018 20:22) (75 - 107)  BP: 135/74 (16 Feb 2018 19:30) (115/55 - 182/79)  RR: 17 (16 Feb 2018 20:22) (14 - 18)  SpO2: 98% (16 Feb 2018 20:22) (94% - 100%)    Gen: NAD, AAOx3    Left Lower Extremity:  dressing w/ minor strikethrough reinforced w/ ABD  KI and HMV in place  Abduction pillow  +EHL/FHL/TA/GS  SILT L3-S1  +DP/PT Pulses  Compartments soft  No calf TTP B/L

## 2018-02-16 NOTE — DISCHARGE NOTE ADULT - NS AS ACTIVITY OBS
Weightbearing as tolerated Weightbearing as tolerated,-WBAT with posterior hip precautions per ortho

## 2018-02-17 LAB
-  AMIKACIN: SIGNIFICANT CHANGE UP
-  AMPICILLIN/SULBACTAM: SIGNIFICANT CHANGE UP
-  AMPICILLIN: SIGNIFICANT CHANGE UP
-  AZTREONAM: SIGNIFICANT CHANGE UP
-  CEFAZOLIN: SIGNIFICANT CHANGE UP
-  CEFEPIME: SIGNIFICANT CHANGE UP
-  CEFOXITIN: SIGNIFICANT CHANGE UP
-  CEFTAZIDIME: SIGNIFICANT CHANGE UP
-  CEFTRIAXONE: SIGNIFICANT CHANGE UP
-  CIPROFLOXACIN: SIGNIFICANT CHANGE UP
-  ERTAPENEM: SIGNIFICANT CHANGE UP
-  GENTAMICIN: SIGNIFICANT CHANGE UP
-  IMIPENEM: SIGNIFICANT CHANGE UP
-  LEVOFLOXACIN: SIGNIFICANT CHANGE UP
-  MEROPENEM: SIGNIFICANT CHANGE UP
-  NITROFURANTOIN: SIGNIFICANT CHANGE UP
-  PIPERACILLIN/TAZOBACTAM: SIGNIFICANT CHANGE UP
-  TOBRAMYCIN: SIGNIFICANT CHANGE UP
-  TRIMETHOPRIM/SULFAMETHOXAZOLE: SIGNIFICANT CHANGE UP
ANION GAP SERPL CALC-SCNC: 7 MMOL/L — SIGNIFICANT CHANGE UP (ref 5–17)
BUN SERPL-MCNC: 27 MG/DL — HIGH (ref 7–23)
CALCIUM SERPL-MCNC: 8.4 MG/DL — LOW (ref 8.5–10.1)
CHLORIDE SERPL-SCNC: 106 MMOL/L — SIGNIFICANT CHANGE UP (ref 96–108)
CO2 SERPL-SCNC: 27 MMOL/L — SIGNIFICANT CHANGE UP (ref 22–31)
CREAT SERPL-MCNC: 0.53 MG/DL — SIGNIFICANT CHANGE UP (ref 0.5–1.3)
CULTURE RESULTS: SIGNIFICANT CHANGE UP
GLUCOSE SERPL-MCNC: 86 MG/DL — SIGNIFICANT CHANGE UP (ref 70–99)
HCT VFR BLD CALC: 28.6 % — LOW (ref 34.5–45)
HGB BLD-MCNC: 9.6 G/DL — LOW (ref 11.5–15.5)
MCHC RBC-ENTMCNC: 30.4 PG — SIGNIFICANT CHANGE UP (ref 27–34)
MCHC RBC-ENTMCNC: 33.5 GM/DL — SIGNIFICANT CHANGE UP (ref 32–36)
MCV RBC AUTO: 90.6 FL — SIGNIFICANT CHANGE UP (ref 80–100)
METHOD TYPE: SIGNIFICANT CHANGE UP
ORGANISM # SPEC MICROSCOPIC CNT: SIGNIFICANT CHANGE UP
ORGANISM # SPEC MICROSCOPIC CNT: SIGNIFICANT CHANGE UP
PLATELET # BLD AUTO: 228 K/UL — SIGNIFICANT CHANGE UP (ref 150–400)
POTASSIUM SERPL-MCNC: 3.5 MMOL/L — SIGNIFICANT CHANGE UP (ref 3.5–5.3)
POTASSIUM SERPL-SCNC: 3.5 MMOL/L — SIGNIFICANT CHANGE UP (ref 3.5–5.3)
RBC # BLD: 3.15 M/UL — LOW (ref 3.8–5.2)
RBC # FLD: 12.6 % — SIGNIFICANT CHANGE UP (ref 10.3–14.5)
SODIUM SERPL-SCNC: 140 MMOL/L — SIGNIFICANT CHANGE UP (ref 135–145)
SPECIMEN SOURCE: SIGNIFICANT CHANGE UP
WBC # BLD: 13.5 K/UL — HIGH (ref 3.8–10.5)
WBC # FLD AUTO: 13.5 K/UL — HIGH (ref 3.8–10.5)

## 2018-02-17 PROCEDURE — 99223 1ST HOSP IP/OBS HIGH 75: CPT

## 2018-02-17 RX ORDER — CARBIDOPA AND LEVODOPA 25; 100 MG/1; MG/1
1.5 TABLET ORAL
Qty: 0 | Refills: 0 | Status: DISCONTINUED | OUTPATIENT
Start: 2018-02-17 | End: 2018-02-19

## 2018-02-17 RX ORDER — CARBIDOPA AND LEVODOPA 25; 100 MG/1; MG/1
2 TABLET ORAL
Qty: 0 | Refills: 0 | Status: DISCONTINUED | OUTPATIENT
Start: 2018-02-17 | End: 2018-02-19

## 2018-02-17 RX ADMIN — POLYETHYLENE GLYCOL 3350 17 GRAM(S): 17 POWDER, FOR SOLUTION ORAL at 11:53

## 2018-02-17 RX ADMIN — DULOXETINE HYDROCHLORIDE 30 MILLIGRAM(S): 30 CAPSULE, DELAYED RELEASE ORAL at 11:52

## 2018-02-17 RX ADMIN — GABAPENTIN 600 MILLIGRAM(S): 400 CAPSULE ORAL at 13:38

## 2018-02-17 RX ADMIN — Medication 1 MILLIGRAM(S): at 11:52

## 2018-02-17 RX ADMIN — QUETIAPINE FUMARATE 25 MILLIGRAM(S): 200 TABLET, FILM COATED ORAL at 22:14

## 2018-02-17 RX ADMIN — Medication 100 MILLIGRAM(S): at 06:56

## 2018-02-17 RX ADMIN — Medication 1 TABLET(S): at 11:52

## 2018-02-17 RX ADMIN — CARBIDOPA AND LEVODOPA 2 TABLET(S): 25; 100 TABLET ORAL at 22:21

## 2018-02-17 RX ADMIN — RASAGILINE 1 MILLIGRAM(S): 0.5 TABLET ORAL at 11:51

## 2018-02-17 RX ADMIN — Medication 0.5 MILLIGRAM(S): at 22:15

## 2018-02-17 RX ADMIN — Medication 100 MILLIGRAM(S): at 13:38

## 2018-02-17 RX ADMIN — Medication 325 MILLIGRAM(S): at 05:11

## 2018-02-17 RX ADMIN — CEFTRIAXONE 1000 MILLIGRAM(S): 500 INJECTION, POWDER, FOR SOLUTION INTRAMUSCULAR; INTRAVENOUS at 05:12

## 2018-02-17 RX ADMIN — OXYCODONE HYDROCHLORIDE 5 MILLIGRAM(S): 5 TABLET ORAL at 11:51

## 2018-02-17 RX ADMIN — Medication 100 MILLIGRAM(S): at 22:15

## 2018-02-17 RX ADMIN — Medication 325 MILLIGRAM(S): at 17:04

## 2018-02-17 RX ADMIN — Medication 1000 UNIT(S): at 11:52

## 2018-02-17 RX ADMIN — CARBIDOPA AND LEVODOPA 1 TABLET(S): 25; 100 TABLET ORAL at 17:06

## 2018-02-17 RX ADMIN — CARBIDOPA AND LEVODOPA 1 TABLET(S): 25; 100 TABLET ORAL at 13:38

## 2018-02-17 RX ADMIN — CARBIDOPA AND LEVODOPA 1 TABLET(S): 25; 100 TABLET ORAL at 14:45

## 2018-02-17 RX ADMIN — PANTOPRAZOLE SODIUM 40 MILLIGRAM(S): 20 TABLET, DELAYED RELEASE ORAL at 11:52

## 2018-02-17 RX ADMIN — Medication 500 MILLIGRAM(S): at 17:04

## 2018-02-17 RX ADMIN — Medication 325 MILLIGRAM(S): at 09:06

## 2018-02-17 RX ADMIN — GABAPENTIN 600 MILLIGRAM(S): 400 CAPSULE ORAL at 22:15

## 2018-02-17 RX ADMIN — Medication 1 TABLET(S): at 14:44

## 2018-02-17 RX ADMIN — Medication 325 MILLIGRAM(S): at 17:06

## 2018-02-17 RX ADMIN — Medication 325 MILLIGRAM(S): at 11:52

## 2018-02-17 NOTE — PHYSICAL THERAPY INITIAL EVALUATION ADULT - GENERAL OBSERVATIONS, REHAB EVAL
Patient received in bed on 2N, daughter at bedside.  +flowtrons, + L KI, +IV. Patient c/o pain in L hip at 4/10.

## 2018-02-17 NOTE — PROGRESS NOTE ADULT - SUBJECTIVE AND OBJECTIVE BOX
Pt S/E at bedside, no acute events overnight, pain controlled    Vital Signs Last 24 Hrs  T(C): 37.4 (17 Feb 2018 05:09), Max: 37.4 (17 Feb 2018 05:09)  T(F): 99.3 (17 Feb 2018 05:09), Max: 99.3 (17 Feb 2018 05:09)  HR: 97 (17 Feb 2018 05:09) (75 - 97)  BP: 125/49 (17 Feb 2018 05:09) (96/71 - 182/79)  RR: 16 (17 Feb 2018 05:09) (14 - 18)  SpO2: 99% (17 Feb 2018 05:09) (96% - 100%)    Gen: NAD, AAOx3    Left Lower Extremity:  Dressing clean dry intact  +EHL/FHL/TA/GS  SILT L3-S1  +DP/PT Pulses  Compartments soft  No calf TTP B/L  Abduction pillow in place

## 2018-02-17 NOTE — PHYSICAL THERAPY INITIAL EVALUATION ADULT - ACTIVE RANGE OF MOTION EXAMINATION, REHAB EVAL
R LE not tested, in KI/bilateral upper extremity Active ROM was WFL (within functional limits)/Left LE Active ROM was WFL (within functional limits)

## 2018-02-17 NOTE — PROGRESS NOTE ADULT - SUBJECTIVE AND OBJECTIVE BOX
PCP- DR Nelson    CC- Patient is a 77y old  Female who presents with a chief complaint of left hip pain (15 Feb 2018 16:21)    HPI:  77y female w/ PMH Parkinson's dementia, chronic low back pain due to lumbar stenosis/scoliosis, HTN.   Pt was seen in ED yesterday  after fall at [facility] and was found to have left hip dislocation;  hip was relocated and then pt was sent back to [facility] w/ knee immobilizer and abduction pillow.  Pt returns from [facility] today after being found on floor complaining of severe left hip pain and found to have dislocation again.     Pt initially had left femoral neck fracture s/p partial hemiarthroplasty in 2017.  She is noted to have gait instability and recurrent falls prior to and after procedure.    Pt poor historian due to dementia therefore hx obtained from chart.  Pt denies pain, states she lives around her but uncertain where and denies taking meds.     18- for OR today    18: POD #1, awake and alert, somewhat uncomfortable. Nurse notified for pain medication.        REVIEW OF SYSTEMS:    CONSTITUTIONAL: No weakness, fevers or chills  EYES/ENT: No visual changes;  No vertigo or throat pain   NECK: No pain or stiffness  RESPIRATORY: No cough, wheezing, hemoptysis; No shortness of breath  CARDIOVASCULAR: No chest pain or palpitations  GASTROINTESTINAL: No abdominal or epigastric pain. No nausea, vomiting, or hematemesis; No diarrhea or constipation. No melena or hematochezia.  GENITOURINARY: No dysuria, frequency or hematuria  NEUROLOGICAL: No numbness or weakness  MUSCULOSKELETAL: (+) left hip pain  SKIN: No itching, burning, rashes, or lesions   All other review of systems is negative unless indicated above          T(C): 36.8 (18 @ 04:00), Max: 36.8 (18 @ 04:00)  HR: 105 (18 @ 04:00) (87 - 110)  BP: 141/59 (18 @ 04:00) (127/59 - 170/89)  RR: 16 (18 @ 04:00) (16 - 18)  SpO2: 94% (18 @ 04:00) (94% - 100%)  Wt(kg): --    LABS:                        12.7   13.9  )-----------( 291      ( 2018 05:22 )             37.9     142  |  108  |  26<H>  ----------------------------<  104<H>  3.3<L>   |  28  |  0.60    Ca    8.9      2018 05:22    TPro  7.3  /  Alb  3.9  /  TBili  0.5  /  DBili  x   /  AST  39<H>  /  ALT  10<L>  /  AlkPhos  79  02-15  PT/INR - ( 2018 07:19 )   PT: 11.8 sec;   INR: 1.09 ratio    PTT - ( 15 Feb 2018 09:41 )  PTT:26.1 sec    Urinalysis Basic - ( 15 Feb 2018 12:25 )  Color: Yellow / Appearance: Clear / S.015 / pH: x  Gluc: x / Ketone: Moderate  / Bili: Negative / Urobili: Negative mg/dL   Blood: x / Protein: 30 mg/dL / Nitrite: Negative   Leuk Esterase: Trace / RBC: 0-2 /HPF / WBC 0-2   Sq Epi: x / Non Sq Epi: x / Bacteria: x    RADIOLOGY & ADDITIONAL TESTS:  EXAM:  XR HIP INCL PELV 2-3V LT                        PROCEDURE DATE:  02/15/2018    INTERPRETATION:  CLINICAL INFORMATION: Post reduction.    TECHNIQUE: Single frontal radiograph of the left hip was performed.   COMPARISON: February 15,2018 at 11:09 AM.    IMPRESSION:  Although evaluation is suboptimal due to the lack of orthogonal   radiograph, previously seen dislocated left hip appears reduced. Left hip   hemiarthroplasty with unchanged radiopaque fragment adjacent to the   lesser trochanter.    PHYSICAL EXAM:  GENERAL: NAD, well-groomed, well-developed  HEAD:  Atraumatic, Normocephalic  EYES: EOMI, PERRLA, conjunctiva and sclera clear  HEENT: Moist mucous membranes  NECK: Supple, No JVD  NERVOUS SYSTEM:  Alert & Oriented X3  CHEST/LUNG: Clear to auscultation bilaterally; No rales, rhonchi, wheezing, or rubs  HEART: Regular rate and rhythm; No murmurs, rubs, or gallops  ABDOMEN: Soft, Nontender, Nondistended; Bowel sounds present  GENITOURINARY- Voiding, no palpable bladder  EXTREMITIES:  2+ Peripheral Pulses, No clubbing, cyanosis, or edema  MUSCULOSKELTAL- LLE limited ROM  SKIN-no rash, no lesion  CNS- alert, oriented X3, non focal       acetaminophen   Tablet 650 milliGRAM(s) Oral every 6 hours PRN  calcium carbonate 1250 mG (OsCal) 1 Tablet(s) Oral daily  carbidopa/levodopa  25/100 2 Tablet(s) Oral daily  carbidopa/levodopa  25/100 1 Tablet(s) Oral three times a day  carbidopa/levodopa  25/100 1.5 Tablet(s) Oral daily  carbidopa/levodopa CR 25/100 2 Tablet(s) Oral at bedtime  cholecalciferol 1000 Unit(s) Oral daily  clonazePAM Tablet 0.5 milliGRAM(s) Oral at bedtime  DULoxetine 30 milliGRAM(s) Oral daily  gabapentin 600 milliGRAM(s) Oral three times a day  potassium chloride  10 mEq/100 mL IVPB 10 milliEquivalent(s) IV Intermittent every 1 hour  QUEtiapine 25 milliGRAM(s) Oral at bedtime  rasagiline Tablet 1 milliGRAM(s) Oral daily  sodium chloride 0.9% with potassium chloride 20 mEq/L 1000 milliLiter(s) IV Continuous <Continuous>  sodium chloride 0.9%. 1000 milliLiter(s) IV Continuous <Continuous> PCP- DR Nelson    CC- Patient is a 77y old  Female who presents with a chief complaint of left hip pain (15 Feb 2018 16:21)    HPI:  77y female w/ PMH Parkinson's dementia, chronic low back pain due to lumbar stenosis/scoliosis, HTN.   Pt was seen in ED yesterday  after fall at [facility] and was found to have left hip dislocation;  hip was relocated and then pt was sent back to [facility] w/ knee immobilizer and abduction pillow.  Pt returns from [facility] today after being found on floor complaining of severe left hip pain and found to have dislocation again.     Pt initially had left femoral neck fracture s/p partial hemiarthroplasty in 2017.  She is noted to have gait instability and recurrent falls prior to and after procedure.    Pt poor historian due to dementia therefore hx obtained from chart.  Pt denies pain, states she lives around her but uncertain where and denies taking meds.     18- for OR today    18: POD #1, awake and alert, somewhat uncomfortable. Nurse notified for pain medication.        REVIEW OF SYSTEMS:    CONSTITUTIONAL: No weakness, fevers or chills  EYES/ENT: No visual changes;  No vertigo or throat pain   NECK: No pain or stiffness  RESPIRATORY: No cough, wheezing, hemoptysis; No shortness of breath  CARDIOVASCULAR: No chest pain or palpitations  GASTROINTESTINAL: No abdominal or epigastric pain. No nausea, vomiting, or hematemesis; No diarrhea or constipation. No melena or hematochezia.  GENITOURINARY: No dysuria, frequency or hematuria  NEUROLOGICAL: No numbness or weakness  MUSCULOSKELETAL: (+) left hip pain  SKIN: No itching, burning, rashes, or lesions   All other review of systems is negative unless indicated above          PHYSICAL EXAM:    Constitutional: NAD, awake and alert, well-developed  HEENT: PERR, EOMI, Normal Hearing, MMM  Neck: Soft and supple, No LAD, No JVD  Respiratory: Breath sounds are clear bilaterally, No wheezing, rales or rhonchi  Cardiovascular: S1 and S2, regular rate and rhythm, no Murmurs, gallops or rubs  Gastrointestinal: Bowel Sounds present, soft, nontender, nondistended, no guarding, no rebound  Extremities: No peripheral edema  Vascular: 2+ peripheral pulses  Neurological: A/O x 1, no focal deficits  Musculoskeletal: 5/5 strength b/l upper and lower extremities. left leg in abd pillow.  Skin: No rashes          T(C): 36.8 (18 @ 04:00), Max: 36.8 (18 @ 04:00)  HR: 105 (18 @ 04:00) (87 - 110)  BP: 141/59 (18 @ 04:00) (127/59 - 170/89)  RR: 16 (18 @ 04:00) (16 - 18)  SpO2: 94% (18 @ 04:00) (94% - 100%)  Wt(kg): --    LABS:                        12.7   13.9  )-----------( 291      ( 2018 05:22 )             37.9     142  |  108  |  26<H>  ----------------------------<  104<H>  3.3<L>   |  28  |  0.60    Ca    8.9      2018 05:22    TPro  7.3  /  Alb  3.9  /  TBili  0.5  /  DBili  x   /  AST  39<H>  /  ALT  10<L>  /  AlkPhos  79  02-15  PT/INR - ( 2018 07:19 )   PT: 11.8 sec;   INR: 1.09 ratio    PTT - ( 15 Feb 2018 09:41 )  PTT:26.1 sec    Urinalysis Basic - ( 15 Feb 2018 12:25 )  Color: Yellow / Appearance: Clear / S.015 / pH: x  Gluc: x / Ketone: Moderate  / Bili: Negative / Urobili: Negative mg/dL   Blood: x / Protein: 30 mg/dL / Nitrite: Negative   Leuk Esterase: Trace / RBC: 0-2 /HPF / WBC 0-2   Sq Epi: x / Non Sq Epi: x / Bacteria: x    RADIOLOGY & ADDITIONAL TESTS:  EXAM:  XR HIP INCL PELV 2-3V LT                        PROCEDURE DATE:  02/15/2018    INTERPRETATION:  CLINICAL INFORMATION: Post reduction.    TECHNIQUE: Single frontal radiograph of the left hip was performed.   COMPARISON: February 15,2018 at 11:09 AM.    IMPRESSION:  Although evaluation is suboptimal due to the lack of orthogonal   radiograph, previously seen dislocated left hip appears reduced. Left hip   hemiarthroplasty with unchanged radiopaque fragment adjacent to the   lesser trochanter.    PHYSICAL EXAM:  GENERAL: NAD, well-groomed, well-developed  HEAD:  Atraumatic, Normocephalic  EYES: EOMI, PERRLA, conjunctiva and sclera clear  HEENT: Moist mucous membranes  NECK: Supple, No JVD  NERVOUS SYSTEM:  Alert & Oriented X3  CHEST/LUNG: Clear to auscultation bilaterally; No rales, rhonchi, wheezing, or rubs  HEART: Regular rate and rhythm; No murmurs, rubs, or gallops  ABDOMEN: Soft, Nontender, Nondistended; Bowel sounds present  GENITOURINARY- Voiding, no palpable bladder  EXTREMITIES:  2+ Peripheral Pulses, No clubbing, cyanosis, or edema  MUSCULOSKELTAL- LLE limited ROM  SKIN-no rash, no lesion  CNS- alert, oriented X3, non focal       acetaminophen   Tablet 650 milliGRAM(s) Oral every 6 hours PRN  calcium carbonate 1250 mG (OsCal) 1 Tablet(s) Oral daily  carbidopa/levodopa  25/100 2 Tablet(s) Oral daily  carbidopa/levodopa  25/100 1 Tablet(s) Oral three times a day  carbidopa/levodopa  25/100 1.5 Tablet(s) Oral daily  carbidopa/levodopa CR 25/100 2 Tablet(s) Oral at bedtime  cholecalciferol 1000 Unit(s) Oral daily  clonazePAM Tablet 0.5 milliGRAM(s) Oral at bedtime  DULoxetine 30 milliGRAM(s) Oral daily  gabapentin 600 milliGRAM(s) Oral three times a day  potassium chloride  10 mEq/100 mL IVPB 10 milliEquivalent(s) IV Intermittent every 1 hour  QUEtiapine 25 milliGRAM(s) Oral at bedtime  rasagiline Tablet 1 milliGRAM(s) Oral daily  sodium chloride 0.9% with potassium chloride 20 mEq/L 1000 milliLiter(s) IV Continuous <Continuous>  sodium chloride 0.9%. 1000 milliLiter(s) IV Continuous <Continuous>

## 2018-02-17 NOTE — PHYSICAL THERAPY INITIAL EVALUATION ADULT - PERTINENT HX OF CURRENT PROBLEM, REHAB EVAL
76 yo F admitted post a fall at home.  Pt initially had left femoral neck fracture s/p partial hemiarthroplasty in Nov. 2017.   Pt was seen in ED yesterday 2/14 after fall at [facility] and was found to have left hip dislocation;  hip was relocated and then pt was sent back to [facility] w/ knee immobilizer and abduction pillow.  Pt returns from [facility] today after being found on floor complaining of severe left hip pain and found to have dislocation again.

## 2018-02-17 NOTE — CONSULT NOTE ADULT - SUBJECTIVE AND OBJECTIVE BOX
HPI: This is a 77y female w/ PMH Parkinson's dementia, chronic low back pain due to lumbar stenosis/scoliosis, HTN. Pt seen in ED 18 after fall at [facility] and was found to have left hip dislocation;  hip was relocated and then pt was sent back to [facility] w/ knee immobilizer and abduction pillow.  Pt returns from [facility] today after being found on floor complaining of severe left hip pain and found to have dislocation again. Pt initially had left femoral neck fracture s/p partial hemiarthroplasty in 2017.  She is noted to have gait instability and recurrent falls prior to and after procedure. Pt poor historian due to dementia therefore hx obtained from chart.  Pt denies pain, states she lives around her but uncertain where and denies taking meds. (15 Feb 2018 16:21). She is s/p left hip hemiarthroplasty recurrent dislocations conversion to left JEREMIE with Dr. South on 18.     Patient is a 77y old  Female who presents with a chief complaint of left hip pain (2018 18:29)    Consulted by Dr. South for VTE prophylaxis, risk stratification, and anticoagulation management.    PAST MEDICAL & SURGICAL HISTORY:  Scoliosis  Neuropathy  Parkinson's disease  Status post hip hemiarthroplasty    BMI: 26.6    CrCL: 98.65    Caprini VTE Risk Score: 9    IMPROVE Bleeding Risk Score: 1.5    Falls Risk:   High ( X )  Mod (  )  Low (  )    EBL: 100 ml    18: Pt seen at bedside, no complaints, resting comfortably. As per RN, pt pulled out drainage tube. Spoke with daughter, [phone], pt has fallen frequently, since January already had 3 falls. Discussed thrombosis risks for pt with daughter, and needs VTE ppx. However, with her frequent and recurrent fall hx, risks outweigh benefit for pt to be on oral anticoagulation. Daughter agrees to  mg BID.      FAMILY HISTORY:  Family history of age of death (Mother): mother  of old age  Family history of Parkinson disease (Father)    Denies any personal or familial history of clotting or bleeding disorders.    Allergies    No Known Allergies    Intolerances    REVIEW OF SYSTEMS    (  )Fever	     (  )Constipation	(  )SOB			(  )Headache	(  )Dysuria  (  )Chills	     (  )Melena	(  )Dyspnea present on exertion    (  )Dizziness                    (  )Polyuria  (  )Nausea	     (  )Hematochezia	(  )Cough		                    (  )Syncope   	(  )Hematuria  (  )Vomiting    (  )Chest Pain	(  )Wheezing		(  )Weakness  (  )Diarrhea     (  )Palpitations	(  )Anorexia		(  )Myalgia    All other review of systems negative: Yes    PHYSICAL EXAM:    Constitutional: Appears Well    Neurological: A& O x 1 to name/, doesn't know which hospital, reports "February" and year ""    Skin: Warm    Respiratory and Thorax: normal effort; Breath sounds: normal; No rales/wheezing/rhonchi  	  Cardiovascular: S1, S2, regular, NMBR	    Gastrointestinal: BS + x 4Q, nontender	    Genitourinary:  Bladder nondistended, nontender    Musculoskeletal:   General Right:   no muscle/joint tenderness,   normal tone, no joint swelling,   ROM: full	    General Left:   no muscle/joint tenderness,   normal tone, no joint swelling,   ROM: limited, abduction pillow in place    Hip:  Left: Aquacel dressing CDI    Lower extrems:   Right: no calf tenderness              negative nubia's sign               + pedal pulses    Left:   no calf tenderness              negative nubia's sign               + pedal pulses               9.6    13.5  )-----------( 228      ( 2018 05:48 )             28.6       02-17    140  |  106  |  27<H>  ----------------------------<  86  3.5   |  27  |  0.53    Ca    8.4<L>      2018 05:48    PT/INR - ( 2018 07:19 )   PT: 11.8 sec;   INR: 1.09 ratio      MEDICATIONS  (STANDING):  ascorbic acid 500 milliGRAM(s) Oral two times a day  aspirin enteric coated 325 milliGRAM(s) Oral two times a day  calcium carbonate 1250 mG (OsCal) 1 Tablet(s) Oral daily  carbidopa/levodopa  25/100 1 Tablet(s) Oral three times a day  carbidopa/levodopa  25/100 2 Tablet(s) Oral daily  carbidopa/levodopa  25/100 1.5 Tablet(s) Oral daily  carbidopa/levodopa CR 25/100 2 Tablet(s) Oral at bedtime  cefTRIAXone Injectable. 1000 milliGRAM(s) IV Push every 24 hours  cholecalciferol 1000 Unit(s) Oral daily  clonazePAM Tablet 0.5 milliGRAM(s) Oral at bedtime  docusate sodium 100 milliGRAM(s) Oral three times a day  DULoxetine 30 milliGRAM(s) Oral daily  ferrous    sulfate 325 milliGRAM(s) Oral three times a day with meals  folic acid 1 milliGRAM(s) Oral daily  gabapentin 600 milliGRAM(s) Oral three times a day  lactated ringers. 1000 milliLiter(s) IV Continuous <Continuous>  multivitamin 1 Tablet(s) Oral daily  pantoprazole    Tablet 40 milliGRAM(s) Oral daily  polyethylene glycol 3350 17 Gram(s) Oral daily  QUEtiapine 25 milliGRAM(s) Oral at bedtime  rasagiline Tablet 1 milliGRAM(s) Oral daily  sodium chloride 0.9% with potassium chloride 20 mEq/L 1000 milliLiter(s) IV Continuous <Continuous>  sodium chloride 0.9%. 1000 milliLiter(s) IV Continuous <Continuous>    Vital Signs Last 24 Hrs  T(C): 37.6 (2018 11:30), Max: 37.6 (2018 11:30)  T(F): 99.6 (2018 11:30), Max: 99.6 (2018 11:30)  HR: 94 (2018 11:30) (75 - 97)  BP: 125/48 (2018 11:30) (96/71 - 182/79)  BP(mean): --  RR: 16 (2018 11:30) (14 - 17)  SpO2: 97% (2018 11:30) (96% - 100%)    DVT Prophylaxis:  LMWH                   (  )  Heparin SQ           (  )  Coumadin             (  )  Xarelto                  (  )  Eliquis                   (  )  Venodynes           (  )  Ambulation          (  )  UFH                       (  )  Contraindicated  (  )  ASA (X)

## 2018-02-17 NOTE — PHYSICAL THERAPY INITIAL EVALUATION ADULT - IMPAIRMENTS FOUND, PT EVAL
aerobic capacity/endurance/arousal, attention, and cognition/gait, locomotion, and balance/gross motor

## 2018-02-17 NOTE — CONSULT NOTE ADULT - ASSESSMENT
A 77y female w/ PMH Parkinson's dementia, chronic low back pain due to lumbar stenosis/scoliosis, HTN from [facility] with frequent falls and found to have left femoral neck fx s/p partial hemiarthroplasty in 11/2017, and now conversion to left JEREMIE with Dr. South 2/16/18. Pt is at risk for thrombosis and oral AC would be optimal for VTE prevention, however as per discussion with pt's daughter, patient frequently falls (since early this year had 3 falls), and also would be at risk for internal bleeding if on oral AC; risks outweigh benefits currently, and daughter agrees with  mg BID for now.     Plan:  Enteric coated ASA 325mg PO BID x 30 days  c/w protonix 40 mg PO daily  Daily CBC/BMP  maintain b/l Venodynes and encourage ambulation  dispo: possibly [facility]    Thank you for this consult, will continue to follow.
A/P: 77y Female with left hip hemiarthroplasty dislocation  -Post reduction films show hip is located  -Post reduction exam unchanged; pt NVI  -Hip Abduction pillow and knee immobilizer placed  -WBAT, posterior hip precautions  -D/w attending Dr. Khan and agrees with above plan  -will advise if plan changes
A/P: 77y Female with left hip hemiarthroplasty dislocation  -Post reduction films show hip is located  -Post reduction exam unchanged; pt NVI  -Hip Abduction pillow and knee immobilizer placed  -WBAT, posterior hip precautions  -D/w attending Dr. South and agrees with above plan  -Pt needs one-to-one supervision at all times  - Will d/w attending Dr. South and advise if plan changes

## 2018-02-18 PROCEDURE — 99233 SBSQ HOSP IP/OBS HIGH 50: CPT

## 2018-02-18 RX ORDER — ASPIRIN/CALCIUM CARB/MAGNESIUM 324 MG
1 TABLET ORAL
Qty: 0 | Refills: 0 | COMMUNITY
Start: 2018-02-18

## 2018-02-18 RX ORDER — POTASSIUM CHLORIDE 20 MEQ
40 PACKET (EA) ORAL EVERY 4 HOURS
Qty: 0 | Refills: 0 | Status: COMPLETED | OUTPATIENT
Start: 2018-02-18 | End: 2018-02-18

## 2018-02-18 RX ADMIN — RASAGILINE 1 MILLIGRAM(S): 0.5 TABLET ORAL at 13:18

## 2018-02-18 RX ADMIN — Medication 500 MILLIGRAM(S): at 06:44

## 2018-02-18 RX ADMIN — CARBIDOPA AND LEVODOPA 1.5 TABLET(S): 25; 100 TABLET ORAL at 10:32

## 2018-02-18 RX ADMIN — PANTOPRAZOLE SODIUM 40 MILLIGRAM(S): 20 TABLET, DELAYED RELEASE ORAL at 13:18

## 2018-02-18 RX ADMIN — GABAPENTIN 600 MILLIGRAM(S): 400 CAPSULE ORAL at 22:41

## 2018-02-18 RX ADMIN — CARBIDOPA AND LEVODOPA 1 TABLET(S): 25; 100 TABLET ORAL at 18:29

## 2018-02-18 RX ADMIN — Medication 0.5 MILLIGRAM(S): at 22:41

## 2018-02-18 RX ADMIN — GABAPENTIN 600 MILLIGRAM(S): 400 CAPSULE ORAL at 06:44

## 2018-02-18 RX ADMIN — Medication 650 MILLIGRAM(S): at 18:27

## 2018-02-18 RX ADMIN — CARBIDOPA AND LEVODOPA 1 TABLET(S): 25; 100 TABLET ORAL at 13:18

## 2018-02-18 RX ADMIN — CARBIDOPA AND LEVODOPA 2 TABLET(S): 25; 100 TABLET ORAL at 06:46

## 2018-02-18 RX ADMIN — Medication 325 MILLIGRAM(S): at 18:27

## 2018-02-18 RX ADMIN — Medication 40 MILLIEQUIVALENT(S): at 13:18

## 2018-02-18 RX ADMIN — CEFTRIAXONE 1000 MILLIGRAM(S): 500 INJECTION, POWDER, FOR SOLUTION INTRAMUSCULAR; INTRAVENOUS at 06:44

## 2018-02-18 RX ADMIN — GABAPENTIN 600 MILLIGRAM(S): 400 CAPSULE ORAL at 13:18

## 2018-02-18 RX ADMIN — QUETIAPINE FUMARATE 25 MILLIGRAM(S): 200 TABLET, FILM COATED ORAL at 22:41

## 2018-02-18 RX ADMIN — CARBIDOPA AND LEVODOPA 2 TABLET(S): 25; 100 TABLET ORAL at 22:44

## 2018-02-18 RX ADMIN — Medication 40 MILLIEQUIVALENT(S): at 10:31

## 2018-02-18 RX ADMIN — CARBIDOPA AND LEVODOPA 1 TABLET(S): 25; 100 TABLET ORAL at 15:50

## 2018-02-18 RX ADMIN — Medication 325 MILLIGRAM(S): at 06:45

## 2018-02-18 RX ADMIN — Medication 100 MILLIGRAM(S): at 22:41

## 2018-02-18 RX ADMIN — DULOXETINE HYDROCHLORIDE 30 MILLIGRAM(S): 30 CAPSULE, DELAYED RELEASE ORAL at 13:18

## 2018-02-18 RX ADMIN — Medication 100 MILLIGRAM(S): at 06:46

## 2018-02-18 NOTE — PROGRESS NOTE ADULT - SUBJECTIVE AND OBJECTIVE BOX
PCP- DR Nelson    CC- Patient is a 77y old  Female who presents with a chief complaint of left hip pain (15 Feb 2018 16:21)    HPI:  77y female w/ PMH Parkinson's dementia, chronic low back pain due to lumbar stenosis/scoliosis, HTN.   Pt was seen in ED yesterday  after fall at [facility] and was found to have left hip dislocation;  hip was relocated and then pt was sent back to [facility] w/ knee immobilizer and abduction pillow.  Pt returns from [facility] today after being found on floor complaining of severe left hip pain and found to have dislocation again.     Pt initially had left femoral neck fracture s/p partial hemiarthroplasty in 2017.  She is noted to have gait instability and recurrent falls prior to and after procedure.    Pt poor historian due to dementia therefore hx obtained from chart.  Pt denies pain, states she lives around her but uncertain where and denies taking meds.     18- for OR today    18: POD #1, awake and alert, somewhat uncomfortable. Nurse notified for pain medication.    18: POD #2. resting comfortably, arousable. offers no complaints.        REVIEW OF SYSTEMS:    CONSTITUTIONAL: No weakness, fevers or chills  EYES/ENT: No visual changes;  No vertigo or throat pain   NECK: No pain or stiffness  RESPIRATORY: No cough, wheezing, hemoptysis; No shortness of breath  CARDIOVASCULAR: No chest pain or palpitations  GASTROINTESTINAL: No abdominal or epigastric pain. No nausea, vomiting, or hematemesis; No diarrhea or constipation. No melena or hematochezia.  GENITOURINARY: No dysuria, frequency or hematuria  NEUROLOGICAL: No numbness or weakness  MUSCULOSKELETAL: (+) left hip pain  SKIN: No itching, burning, rashes, or lesions   All other review of systems is negative unless indicated above          PHYSICAL EXAM:    Constitutional: NAD, awake and alert, well-developed  HEENT: PERR, EOMI, Normal Hearing, MMM  Neck: Soft and supple, No LAD, No JVD  Respiratory: Breath sounds are clear bilaterally, No wheezing, rales or rhonchi  Cardiovascular: S1 and S2, regular rate and rhythm, no Murmurs, gallops or rubs  Gastrointestinal: Bowel Sounds present, soft, nontender, nondistended, no guarding, no rebound  Extremities: No peripheral edema  Vascular: 2+ peripheral pulses  Neurological: A/O x 1, no focal deficits  Musculoskeletal: 5/5 strength b/l upper and lower extremities. left leg in abd pillow.  Skin: No rashes          T(C): 36.8 (18 @ 04:00), Max: 36.8 (18 @ 04:00)  HR: 105 (18 @ 04:00) (87 - 110)  BP: 141/59 (18 @ 04:00) (127/59 - 170/89)  RR: 16 (18 @ 04:00) (16 - 18)  SpO2: 94% (18 @ 04:00) (94% - 100%)  Wt(kg): --    LABS:                        12.7   13.9  )-----------( 291      ( 2018 05:22 )             37.9     142  |  108  |  26<H>  ----------------------------<  104<H>  3.3<L>   |  28  |  0.60    Ca    8.9      2018 05:22    TPro  7.3  /  Alb  3.9  /  TBili  0.5  /  DBili  x   /  AST  39<H>  /  ALT  10<L>  /  AlkPhos  79  02-15  PT/INR - ( 2018 07:19 )   PT: 11.8 sec;   INR: 1.09 ratio    PTT - ( 15 Feb 2018 09:41 )  PTT:26.1 sec    Urinalysis Basic - ( 15 Feb 2018 12:25 )  Color: Yellow / Appearance: Clear / S.015 / pH: x  Gluc: x / Ketone: Moderate  / Bili: Negative / Urobili: Negative mg/dL   Blood: x / Protein: 30 mg/dL / Nitrite: Negative   Leuk Esterase: Trace / RBC: 0-2 /HPF / WBC 0-2   Sq Epi: x / Non Sq Epi: x / Bacteria: x    RADIOLOGY & ADDITIONAL TESTS:  EXAM:  XR HIP INCL PELV 2-3V LT                        PROCEDURE DATE:  02/15/2018    INTERPRETATION:  CLINICAL INFORMATION: Post reduction.    TECHNIQUE: Single frontal radiograph of the left hip was performed.   COMPARISON: February 15,2018 at 11:09 AM.    IMPRESSION:  Although evaluation is suboptimal due to the lack of orthogonal   radiograph, previously seen dislocated left hip appears reduced. Left hip   hemiarthroplasty with unchanged radiopaque fragment adjacent to the   lesser trochanter.    PHYSICAL EXAM:  GENERAL: NAD, well-groomed, well-developed  HEAD:  Atraumatic, Normocephalic  EYES: EOMI, PERRLA, conjunctiva and sclera clear  HEENT: Moist mucous membranes  NECK: Supple, No JVD  NERVOUS SYSTEM:  Alert & Oriented X3  CHEST/LUNG: Clear to auscultation bilaterally; No rales, rhonchi, wheezing, or rubs  HEART: Regular rate and rhythm; No murmurs, rubs, or gallops  ABDOMEN: Soft, Nontender, Nondistended; Bowel sounds present  GENITOURINARY- Voiding, no palpable bladder  EXTREMITIES:  2+ Peripheral Pulses, No clubbing, cyanosis, or edema  MUSCULOSKELTAL- LLE limited ROM secondary to pain  SKIN-no rash, no lesion  CNS- alert, oriented X3, non focal       acetaminophen   Tablet 650 milliGRAM(s) Oral every 6 hours PRN  calcium carbonate 1250 mG (OsCal) 1 Tablet(s) Oral daily  carbidopa/levodopa  25/100 2 Tablet(s) Oral daily  carbidopa/levodopa  25/100 1 Tablet(s) Oral three times a day  carbidopa/levodopa  25/100 1.5 Tablet(s) Oral daily  carbidopa/levodopa CR 25/100 2 Tablet(s) Oral at bedtime  cholecalciferol 1000 Unit(s) Oral daily  clonazePAM Tablet 0.5 milliGRAM(s) Oral at bedtime  DULoxetine 30 milliGRAM(s) Oral daily  gabapentin 600 milliGRAM(s) Oral three times a day  potassium chloride  10 mEq/100 mL IVPB 10 milliEquivalent(s) IV Intermittent every 1 hour  QUEtiapine 25 milliGRAM(s) Oral at bedtime  rasagiline Tablet 1 milliGRAM(s) Oral daily  sodium chloride 0.9% with potassium chloride 20 mEq/L 1000 milliLiter(s) IV Continuous <Continuous>  sodium chloride 0.9%. 1000 milliLiter(s) IV Continuous <Continuous> PCP- DR Nelson    CC- Patient is a 77y old  Female who presents with a chief complaint of left hip pain (15 Feb 2018 16:21)    HPI:  77y female w/ PMH Parkinson's dementia, chronic low back pain due to lumbar stenosis/scoliosis, HTN.   Pt was seen in ED yesterday 2/14 after fall at [facility] and was found to have left hip dislocation;  hip was relocated and then pt was sent back to [facility] w/ knee immobilizer and abduction pillow.  Pt returns from [facility] today after being found on floor complaining of severe left hip pain and found to have dislocation again.     Pt initially had left femoral neck fracture s/p partial hemiarthroplasty in Nov. 2017.  She is noted to have gait instability and recurrent falls prior to and after procedure.    Pt poor historian due to dementia therefore hx obtained from chart.  Pt denies pain, states she lives around her but uncertain where and denies taking meds.     2/16/18- for OR today    2/17/18: POD #1, awake and alert, somewhat uncomfortable. Nurse notified for pain medication.    2/18/18: POD #2. resting comfortably, arousable. offers no complaints.        REVIEW OF SYSTEMS:    CONSTITUTIONAL: No weakness, fevers or chills  EYES/ENT: No visual changes;  No vertigo or throat pain   NECK: No pain or stiffness  RESPIRATORY: No cough, wheezing, hemoptysis; No shortness of breath  CARDIOVASCULAR: No chest pain or palpitations  GASTROINTESTINAL: No abdominal or epigastric pain. No nausea, vomiting, or hematemesis; No diarrhea or constipation. No melena or hematochezia.  GENITOURINARY: No dysuria, frequency or hematuria  NEUROLOGICAL: No numbness or weakness  MUSCULOSKELETAL: (+) left hip pain  SKIN: No itching, burning, rashes, or lesions   All other review of systems is negative unless indicated above          PHYSICAL EXAM:    Constitutional: NAD, awake and alert, well-developed  HEENT: PERR, EOMI, Normal Hearing, MMM  Neck: Soft and supple, No LAD, No JVD  Respiratory: Breath sounds are clear bilaterally, No wheezing, rales or rhonchi  Cardiovascular: S1 and S2, regular rate and rhythm, no Murmurs, gallops or rubs  Gastrointestinal: Bowel Sounds present, soft, nontender, nondistended, no guarding, no rebound  Extremities: No peripheral edema  Vascular: 2+ peripheral pulses  Neurological: A/O x 1, no focal deficits  Musculoskeletal: 5/5 strength b/l upper and lower extremities. left leg in abd pillow.  Skin: No rashes      T(C): 37 (02-18-18 @ 11:26), Max: 37.8 (02-17-18 @ 17:13)  HR: 88 (02-18-18 @ 11:26) (80 - 100)  BP: 122/59 (02-18-18 @ 11:26) (102/43 - 122/59)  RR: 18 (02-18-18 @ 11:26) (16 - 18)  SpO2: 97% (02-18-18 @ 11:26) (97% - 98%)  Wt(kg): --                              8.7    9.3   )-----------( 186      ( 18 Feb 2018 05:33 )             26.8     18 Feb 2018 05:33    141    |  105    |  24     ----------------------------<  97     3.1     |  30     |  0.49     Ca    8.2        18 Feb 2018 05:33        CAPILLARY BLOOD GLUCOSE                      RADIOLOGY & ADDITIONAL TESTS:  EXAM:  XR HIP INCL PELV 2-3V LT                        PROCEDURE DATE:  02/15/2018    INTERPRETATION:  CLINICAL INFORMATION: Post reduction.    TECHNIQUE: Single frontal radiograph of the left hip was performed.   COMPARISON: February 15,2018 at 11:09 AM.    IMPRESSION:  Although evaluation is suboptimal due to the lack of orthogonal   radiograph, previously seen dislocated left hip appears reduced. Left hip   hemiarthroplasty with unchanged radiopaque fragment adjacent to the   lesser trochanter.    PHYSICAL EXAM:  GENERAL: NAD, well-groomed, well-developed  HEAD:  Atraumatic, Normocephalic  EYES: EOMI, PERRLA, conjunctiva and sclera clear  HEENT: Moist mucous membranes  NECK: Supple, No JVD  NERVOUS SYSTEM:  Alert & Oriented X3  CHEST/LUNG: Clear to auscultation bilaterally; No rales, rhonchi, wheezing, or rubs  HEART: Regular rate and rhythm; No murmurs, rubs, or gallops  ABDOMEN: Soft, Nontender, Nondistended; Bowel sounds present  GENITOURINARY- Voiding, no palpable bladder  EXTREMITIES:  2+ Peripheral Pulses, No clubbing, cyanosis, or edema  MUSCULOSKELTAL- LLE limited ROM secondary to pain  SKIN-no rash, no lesion  CNS- alert, oriented X3, non focal       acetaminophen   Tablet 650 milliGRAM(s) Oral every 6 hours PRN  calcium carbonate 1250 mG (OsCal) 1 Tablet(s) Oral daily  carbidopa/levodopa  25/100 2 Tablet(s) Oral daily  carbidopa/levodopa  25/100 1 Tablet(s) Oral three times a day  carbidopa/levodopa  25/100 1.5 Tablet(s) Oral daily  carbidopa/levodopa CR 25/100 2 Tablet(s) Oral at bedtime  cholecalciferol 1000 Unit(s) Oral daily  clonazePAM Tablet 0.5 milliGRAM(s) Oral at bedtime  DULoxetine 30 milliGRAM(s) Oral daily  gabapentin 600 milliGRAM(s) Oral three times a day  potassium chloride  10 mEq/100 mL IVPB 10 milliEquivalent(s) IV Intermittent every 1 hour  QUEtiapine 25 milliGRAM(s) Oral at bedtime  rasagiline Tablet 1 milliGRAM(s) Oral daily  sodium chloride 0.9% with potassium chloride 20 mEq/L 1000 milliLiter(s) IV Continuous <Continuous>  sodium chloride 0.9%. 1000 milliLiter(s) IV Continuous <Continuous>

## 2018-02-18 NOTE — PROGRESS NOTE ADULT - ASSESSMENT
A/P: 77F w/ L hemiarthroplasty dislocation  Pain control  Bed rest  Post hip precautions, abduction pillow, knee immobilizer  NPO for possible OR  IVF while NPO  Hold all chemical DVT ppx  Will d/w attending Dr. South and advise as plan changes
77F s/p fall with left hip fx        #left hip hemiarthroplasty dislocation due to falls, POD #1  (left femoral neck fx s/p partial hemiarthroplasty 11/2017)  Ortho eval appreciated  Pain meds prn  physical therapy eval  fall px      #Anemia  perioperative acute blood loss in the setting of recent sx  monitor H/H      #Parkinson's dementia  continue home meds   supportive care    #Leukocytosis   likely reactive, afebrile    #Hypokalemia  replace prn    #DVT Px  ASA BID
77F s/p fall with left hip fx        #left hip hemiarthroplasty dislocation due to falls, POD #2  (left femoral neck fx s/p partial hemiarthroplasty 11/2017)  Ortho eval appreciated  Pain meds prn  physical therapy eval  fall px      #Anemia  perioperative acute blood loss in the setting of recent sx  receiving 1 unit prbc today  monitor H/H      #Parkinson's dementia  continue home meds   supportive care    #Leukocytosis   likely reactive, afebrile    #Hypokalemia  replace prn    #DVT Px  ASA BID
A 77y female w/ PMH Parkinson's dementia, chronic low back pain due to lumbar stenosis/scoliosis, HTN from [facility] with frequent falls and found to have left femoral neck fx s/p partial hemiarthroplasty in 11/2017, and now conversion to left JEREMIE with Dr. South 2/16/18. Pt is at risk for thrombosis and oral AC would be optimal for VTE prevention, however as per discussion with pt's daughter, patient frequently falls (since early this year had 3 falls), and also would be at risk for internal bleeding if on oral AC; risks outweigh benefits currently, and daughter agrees with  mg BID for now. H/H 8.7/26.8, receiving pRBC transfusion.    Plan:  Enteric coated ASA 325mg PO BID x 30 days (started 2/17/18-3/17/18)  c/w protonix 40 mg PO daily  Daily CBC/BMP  maintain b/l Venodynes and encourage ambulation  dispo: possibly [facility]    Will continue to follow. May call [phone] for this weekend.
A/P: 77F s/p L hemiarthroplasty conversion to JEREMIE POD  Pain Control  DVT ppx  WBAT  PT/OT  Incentive Spirometry  Monitor drain output  Posterior hip precautions  Medical management appreciated  DC planning
A/P: 77F s/p L hemiarthroplasty conversion to JEREMIE POD1  Pain Control  DVT ppx  WBAT  PT/OT  Incentive Spirometry  Posterior hip precautions  Medical management appreciated  DC planning

## 2018-02-18 NOTE — PROGRESS NOTE ADULT - SUBJECTIVE AND OBJECTIVE BOX
Patient seen and examined. Pain controlled.    Physical exam  VS: Afebrile, vital signs stable  Gen: NAD  Left LE: Dressing clean, dry, and intact. +EHL/FHL/TA/GSC. SILT L3-S1. +Dorsalis pedis, capillary refill brisk. Compartments soft and nontender.      77F s/p left hip hemiarthroplasty conversion to JEREMIE POD# 2    WBAT with posterior hip precautions  Pain control  DVT prophylaxis  Abduction while supine/seated  left knee immobilizer at all times for 2 months  PT  Discharge planning

## 2018-02-18 NOTE — PROGRESS NOTE ADULT - SUBJECTIVE AND OBJECTIVE BOX
HPI: This is a 77y female w/ PMH Parkinson's dementia, chronic low back pain due to lumbar stenosis/scoliosis, HTN. Pt seen in ED 18 after fall at [facility] and was found to have left hip dislocation;  hip was relocated and then pt was sent back to [facility] w/ knee immobilizer and abduction pillow.  Pt returns from [facility] today after being found on floor complaining of severe left hip pain and found to have dislocation again. Pt initially had left femoral neck fracture s/p partial hemiarthroplasty in 2017.  She is noted to have gait instability and recurrent falls prior to and after procedure. Pt poor historian due to dementia therefore hx obtained from chart.  Pt denies pain, states she lives around her but uncertain where and denies taking meds. (15 Feb 2018 16:21). She is s/p left hip hemiarthroplasty recurrent dislocations conversion to left JEREMIE with Dr. South on 18.     Patient is a 77y old  Female who presents with a chief complaint of left hip pain (2018 18:29)    Consulted by Dr. South for VTE prophylaxis, risk stratification, and anticoagulation management.    PAST MEDICAL & SURGICAL HISTORY:  Scoliosis  Neuropathy  Parkinson's disease  Status post hip hemiarthroplasty    BMI: 26.6    CrCL: 98.65    Caprini VTE Risk Score: 9    IMPROVE Bleeding Risk Score: 1.5    Falls Risk:   High ( X )  Mod (  )  Low (  )    EBL: 100 ml    18: Pt seen at bedside, no complaints, resting comfortably. As per RN, pt pulled out drainage tube. Spoke with daughter, [phone], pt has fallen frequently, since January already had 3 falls. Discussed thrombosis risks for pt with daughter, and needs VTE ppx. However, with her frequent and recurrent fall hx, risks outweigh benefit for pt to be on oral anticoagulation. Daughter agrees to  mg BID.    18: Pt seen at bedside, no complaints, continues to be confused. Currently receiving pRBC transfusion. H/H 8.7/26.8    FAMILY HISTORY:  Family history of age of death (Mother): mother  of old age  Family history of Parkinson disease (Father)    Denies any personal or familial history of clotting or bleeding disorders.    Allergies    No Known Allergies    Intolerances    REVIEW OF SYSTEMS    (  )Fever	     (  )Constipation	(  )SOB			(  )Headache	(  )Dysuria  (  )Chills	     (  )Melena	(  )Dyspnea present on exertion    (  )Dizziness                    (  )Polyuria  (  )Nausea	     (  )Hematochezia	(  )Cough		                    (  )Syncope   	(  )Hematuria  (  )Vomiting    (  )Chest Pain	(  )Wheezing		(  )Weakness  (  )Diarrhea     (  )Palpitations	(  )Anorexia		(  )Myalgia    All other review of systems negative: Yes    PHYSICAL EXAM:    Constitutional: Appears Well    Neurological: A& O x 1 to name/, doesn't know which hospital, reports "February" and year "" eyes kept closed    Skin: Warm    Respiratory and Thorax: normal effort; Breath sounds: normal; No rales/wheezing/rhonchi  	  Cardiovascular: S1, S2, regular, NMBR	    Gastrointestinal: BS + x 4Q, nontender	    Genitourinary:  Bladder nondistended, nontender    Musculoskeletal:   General Right:   no muscle/joint tenderness,   normal tone, no joint swelling,   ROM: full	    General Left:   no muscle/joint tenderness,   normal tone, no joint swelling,   ROM: limited, abduction pillow in place    Hip:  Left: Aquacel dressing CDI    Lower extrems:   Right: no calf tenderness              negative nubia's sign               + pedal pulses    Left:   no calf tenderness              negative nubia's sign               + pedal pulses                          8.7    9.3   )-----------( 186      ( 2018 05:33 )             26.8       02-18    141  |  105  |  24<H>  ----------------------------<  97  3.1<L>   |  30  |  0.49<L>    Ca    8.2<L>      2018 05:33                   9.6    13.5  )-----------( 228      ( 2018 05:48 )             28.6       02-17    140  |  106  |  27<H>  ----------------------------<  86  3.5   |  27  |  0.53    Ca    8.4<L>      2018 05:48    PT/INR - ( 2018 07:19 )   PT: 11.8 sec;   INR: 1.09 ratio      MEDICATIONS  (STANDING):  ascorbic acid 500 milliGRAM(s) Oral two times a day  aspirin enteric coated 325 milliGRAM(s) Oral two times a day  calcium carbonate 1250 mG (OsCal) 1 Tablet(s) Oral daily  carbidopa/levodopa  25/100 1 Tablet(s) Oral three times a day  carbidopa/levodopa  25/100 2 Tablet(s) Oral <User Schedule>  carbidopa/levodopa  25/100 1.5 Tablet(s) Oral <User Schedule>  carbidopa/levodopa CR 25/100 2 Tablet(s) Oral at bedtime  cefTRIAXone Injectable. 1000 milliGRAM(s) IV Push every 24 hours  cholecalciferol 1000 Unit(s) Oral daily  clonazePAM Tablet 0.5 milliGRAM(s) Oral at bedtime  docusate sodium 100 milliGRAM(s) Oral three times a day  DULoxetine 30 milliGRAM(s) Oral daily  ferrous    sulfate 325 milliGRAM(s) Oral three times a day with meals  folic acid 1 milliGRAM(s) Oral daily  gabapentin 600 milliGRAM(s) Oral three times a day  lactated ringers. 1000 milliLiter(s) (75 mL/Hr) IV Continuous <Continuous>  multivitamin 1 Tablet(s) Oral daily  pantoprazole    Tablet 40 milliGRAM(s) Oral daily  polyethylene glycol 3350 17 Gram(s) Oral daily  potassium chloride   Powder 40 milliEquivalent(s) Oral every 4 hours  QUEtiapine 25 milliGRAM(s) Oral at bedtime  rasagiline Tablet 1 milliGRAM(s) Oral daily  sodium chloride 0.9% with potassium chloride 20 mEq/L 1000 milliLiter(s) (60 mL/Hr) IV Continuous <Continuous>  sodium chloride 0.9%. 1000 milliLiter(s) (75 mL/Hr) IV Continuous <Continuous>    Vital Signs Last 24 Hrs  T(C): 37 (18 @ 11:26), Max: 37.8 (18 @ 17:13)  T(F): 98.6 (18 @ 11:26), Max: 100 (18 @ 17:13)  HR: 88 (18 @ 11:26) (80 - 100)  BP: 122/59 (18 @ 11:26) (102/43 - 122/59)  BP(mean): 73 (18 @ 11:26) (57 - 73)  RR: 18 (18 @ 11:26) (16 - 18)  SpO2: 97% (18 @ 11:26) (97% - 98%)    DVT Prophylaxis:  LMWH                   (  )  Heparin SQ           (  )  Coumadin             (  )  Xarelto                  (  )  Eliquis                   (  )  Venodynes           (  )  Ambulation          (  )  UFH                       (  )  Contraindicated  (  )  ASA (X)

## 2018-02-19 VITALS
OXYGEN SATURATION: 98 % | RESPIRATION RATE: 18 BRPM | DIASTOLIC BLOOD PRESSURE: 54 MMHG | SYSTOLIC BLOOD PRESSURE: 112 MMHG | HEART RATE: 95 BPM | TEMPERATURE: 98 F

## 2018-02-19 RX ADMIN — PANTOPRAZOLE SODIUM 40 MILLIGRAM(S): 20 TABLET, DELAYED RELEASE ORAL at 11:31

## 2018-02-19 RX ADMIN — Medication 100 MILLIGRAM(S): at 06:34

## 2018-02-19 RX ADMIN — Medication 1 TABLET(S): at 11:33

## 2018-02-19 RX ADMIN — RASAGILINE 1 MILLIGRAM(S): 0.5 TABLET ORAL at 11:33

## 2018-02-19 RX ADMIN — POLYETHYLENE GLYCOL 3350 17 GRAM(S): 17 POWDER, FOR SOLUTION ORAL at 11:31

## 2018-02-19 RX ADMIN — GABAPENTIN 600 MILLIGRAM(S): 400 CAPSULE ORAL at 13:16

## 2018-02-19 RX ADMIN — DULOXETINE HYDROCHLORIDE 30 MILLIGRAM(S): 30 CAPSULE, DELAYED RELEASE ORAL at 11:30

## 2018-02-19 RX ADMIN — Medication 1 TABLET(S): at 11:30

## 2018-02-19 RX ADMIN — CARBIDOPA AND LEVODOPA 1 TABLET(S): 25; 100 TABLET ORAL at 15:45

## 2018-02-19 RX ADMIN — CEFTRIAXONE 1000 MILLIGRAM(S): 500 INJECTION, POWDER, FOR SOLUTION INTRAMUSCULAR; INTRAVENOUS at 06:33

## 2018-02-19 RX ADMIN — GABAPENTIN 600 MILLIGRAM(S): 400 CAPSULE ORAL at 06:33

## 2018-02-19 RX ADMIN — Medication 100 MILLIGRAM(S): at 13:16

## 2018-02-19 RX ADMIN — CARBIDOPA AND LEVODOPA 1 TABLET(S): 25; 100 TABLET ORAL at 13:15

## 2018-02-19 RX ADMIN — Medication 325 MILLIGRAM(S): at 11:31

## 2018-02-19 RX ADMIN — CARBIDOPA AND LEVODOPA 1.5 TABLET(S): 25; 100 TABLET ORAL at 09:31

## 2018-02-19 RX ADMIN — Medication 500 MILLIGRAM(S): at 06:33

## 2018-02-19 RX ADMIN — Medication 1000 UNIT(S): at 11:30

## 2018-02-19 RX ADMIN — Medication 1 MILLIGRAM(S): at 11:31

## 2018-02-19 RX ADMIN — Medication 325 MILLIGRAM(S): at 09:31

## 2018-02-19 NOTE — PROGRESS NOTE ADULT - SUBJECTIVE AND OBJECTIVE BOX
Patient seen and examined. Pain controlled.    Physical exam  VS: Afebrile, vital signs stable  Gen: NAD  Left LE: Dressing clean, dry, and intact/reinforced. +EHL/FHL/TA/GSC. SILT L3-S1. +Dorsalis pedis, capillary refill brisk. Compartments soft and nontender.      77F s/p left hip hemiarthroplasty conversion to JEREMIE POD#3  WBAT with posterior hip precautions  Dressing change today 2/19  Pain control  DVT prophylaxis  Abduction while supine/seated  left knee immobilizer at all times for 2 months  PT  Discharge planning

## 2018-02-19 NOTE — PROGRESS NOTE ADULT - PROVIDER SPECIALTY LIST ADULT
Anticoag Management
Hospitalist
Hospitalist
Orthopedics
Hospitalist
Orthopedics
Hospitalist

## 2018-02-19 NOTE — PROGRESS NOTE ADULT - SUBJECTIVE AND OBJECTIVE BOX
PCP- DR Nelson    CC-left hip pain    HPI:  77y female w/ PMH Parkinson's dementia, chronic low back pain due to lumbar stenosis/scoliosis, HTN who presented to the ER with left hip pain. She was seen in the ED a day prior to admission on 2/14 after fall at [facility] and was found to have left hip dislocation;  hip was relocated and then pt was sent back to [facility] w/ knee immobilizer and abduction pillow.  Pt returned from [facility] after being found on floor complaining of severe left hip pain and found to have dislocation again.   Pt initially had left femoral neck fracture s/p partial hemiarthroplasty in Nov. 2017.  She was noted to have gait instability and recurrent falls prior to and after procedure.    Pt poor historian due to dementia therefore hx obtained from chart. She was admitted to the hospitalist service. Underwent conversion of her left hip hemiarthroplasty to total hip arthroplasty with Dr. South.  She tolerated the procedure well. She required prbcs post procedure after which her hemoglobin remained stable.  She was seen by physical therapy and will be discharged to rehab today.    2/19: pt seen and examined this am.  No complaints. No pain. no sob/chest pain.    REVIEW OF SYSTEMS: all 10 systems reviewed and is as above otherwise negative although limited due to dementia.    Vital Signs Last 24 Hrs  T(C): 36.5 (19 Feb 2018 11:08), Max: 36.8 (18 Feb 2018 13:30)  T(F): 97.7 (19 Feb 2018 11:08), Max: 98.3 (18 Feb 2018 17:13)  HR: 95 (19 Feb 2018 11:08) (83 - 99)  BP: 112/54 (19 Feb 2018 11:08) (106/52 - 112/54)  BP(mean): 64 (18 Feb 2018 13:30) (64 - 64)  RR: 18 (19 Feb 2018 11:08) (16 - 18)  SpO2: 98% (19 Feb 2018 11:08) (98% - 98%)    PHYSICAL EXAM:    Constitutional: NAD, awake and alert, well-developed  HEENT: PERR, EOMI, Normal Hearing, MMM  Neck: Soft and supple, No LAD, No JVD  Respiratory: Breath sounds are clear bilaterally, decreased bs at bases  Cardiovascular: S1 and S2, regular rate and rhythm  Gastrointestinal: Bowel Sounds present, soft, nontender, nondistended, no guarding, no rebound  Extremities: No peripheral edema  Vascular: 2+ peripheral pulses  Neurological: A/O x 1, no focal deficits  Musculoskeletal: 5/5 strength b/l upper and lower extremities. left leg in abd pillow.  Skin: No rashes  LABS:                        9.6    9.7   )-----------( 210      ( 19 Feb 2018 07:10 )             28.7     02-19    140  |  106  |  19  ----------------------------<  98  3.5   |  30  |  0.45<L>    Ca    8.2<L>      19 Feb 2018 07:10        RADIOLOGY & ADDITIONAL TESTS:  EXAM:  XR HIP INCL PELV 2-3V LT                        PROCEDURE DATE:  02/15/2018    INTERPRETATION:  CLINICAL INFORMATION: Post reduction.    TECHNIQUE: Single frontal radiograph of the left hip was performed.   COMPARISON: February 15,2018 at 11:09 AM.    IMPRESSION:  Although evaluation is suboptimal due to the lack of orthogonal   radiograph, previously seen dislocated left hip appears reduced. Left hip   hemiarthroplasty with unchanged radiopaque fragment adjacent to the   lesser trochanter.    MEDICATIONS  (STANDING):  ascorbic acid 500 milliGRAM(s) Oral two times a day  aspirin enteric coated 325 milliGRAM(s) Oral two times a day  calcium carbonate 1250 mG (OsCal) 1 Tablet(s) Oral daily  carbidopa/levodopa  25/100 1 Tablet(s) Oral three times a day  carbidopa/levodopa  25/100 2 Tablet(s) Oral <User Schedule>  carbidopa/levodopa  25/100 1.5 Tablet(s) Oral <User Schedule>  carbidopa/levodopa CR 25/100 2 Tablet(s) Oral at bedtime  cefTRIAXone Injectable. 1000 milliGRAM(s) IV Push every 24 hours  cholecalciferol 1000 Unit(s) Oral daily  clonazePAM Tablet 0.5 milliGRAM(s) Oral at bedtime  docusate sodium 100 milliGRAM(s) Oral three times a day  DULoxetine 30 milliGRAM(s) Oral daily  ferrous    sulfate 325 milliGRAM(s) Oral three times a day with meals  folic acid 1 milliGRAM(s) Oral daily  gabapentin 600 milliGRAM(s) Oral three times a day  multivitamin 1 Tablet(s) Oral daily  pantoprazole    Tablet 40 milliGRAM(s) Oral daily  polyethylene glycol 3350 17 Gram(s) Oral daily  QUEtiapine 25 milliGRAM(s) Oral at bedtime  rasagiline Tablet 1 milliGRAM(s) Oral daily    MEDICATIONS  (PRN):  acetaminophen   Tablet 650 milliGRAM(s) Oral every 6 hours PRN For Temp greater than 38 C (100.4 F)  acetaminophen   Tablet. 650 milliGRAM(s) Oral every 6 hours PRN headache  aluminum hydroxide/magnesium hydroxide/simethicone Suspension 30 milliLiter(s) Oral four times a day PRN Indigestion  benzocaine 15 mG/menthol 3.6 mG Lozenge 1 Lozenge Oral every 2 hours PRN Sore Throat  bisacodyl Suppository 10 milliGRAM(s) Rectal daily PRN If no bowel movement by POD#2  diphenhydrAMINE   Capsule 50 milliGRAM(s) Oral every 4 hours PRN Rash and/or Itching  HYDROmorphone  Injectable 1 milliGRAM(s) SubCutaneous every 3 hours PRN Severe Pain  ondansetron Injectable 4 milliGRAM(s) IV Push every 6 hours PRN Nausea and/or Vomiting  oxyCODONE    IR 5 milliGRAM(s) Oral every 4 hours PRN Mild Pain  oxyCODONE    IR 10 milliGRAM(s) Oral every 4 hours PRN Moderate Pain  senna 2 Tablet(s) Oral at bedtime PRN Constipation  zaleplon 5 milliGRAM(s) Oral at bedtime PRN Insomnia    77F, pmh as above a/w:    1. Left hip hemiarthroplasty dislocation due to falls  -s/p  conversion of left hip hemiarthroplasty to total hip arthroplasty with Dr. South.   -WBAT with posterior hip precautions per ortho  -Abduction while supine/seated  -left knee immobilizer at all times for 2 months  -pain control  -physical therapy  -incentive spirometry    2. Acute blood loss anemia:  -stable post transfusion    3. Parkinson's dementia  -continue home meds   -supportive care    4. Leukocytosis   -likely reactive  -resolved    5. Hypokalemia:  -repleted    6. DVT px:  -asa    7. Dispo:   dc planning to rehab once bed available.

## 2018-02-20 LAB
CULTURE RESULTS: SIGNIFICANT CHANGE UP
CULTURE RESULTS: SIGNIFICANT CHANGE UP
SPECIMEN SOURCE: SIGNIFICANT CHANGE UP
SPECIMEN SOURCE: SIGNIFICANT CHANGE UP

## 2018-02-21 LAB — SURGICAL PATHOLOGY FINAL REPORT - CH: SIGNIFICANT CHANGE UP

## 2018-02-23 DIAGNOSIS — G20 PARKINSON'S DISEASE: ICD-10-CM

## 2018-02-23 DIAGNOSIS — F02.80 DEMENTIA IN OTHER DISEASES CLASSIFIED ELSEWHERE, UNSPECIFIED SEVERITY, WITHOUT BEHAVIORAL DISTURBANCE, PSYCHOTIC DISTURBANCE, MOOD DISTURBANCE, AND ANXIETY: ICD-10-CM

## 2018-02-23 DIAGNOSIS — M48.061 SPINAL STENOSIS, LUMBAR REGION WITHOUT NEUROGENIC CLAUDICATION: ICD-10-CM

## 2018-02-23 DIAGNOSIS — D62 ACUTE POSTHEMORRHAGIC ANEMIA: ICD-10-CM

## 2018-02-23 DIAGNOSIS — D72.829 ELEVATED WHITE BLOOD CELL COUNT, UNSPECIFIED: ICD-10-CM

## 2018-02-23 DIAGNOSIS — T84.021A DISLOCATION OF INTERNAL LEFT HIP PROSTHESIS, INITIAL ENCOUNTER: ICD-10-CM

## 2018-02-23 DIAGNOSIS — M41.20 OTHER IDIOPATHIC SCOLIOSIS, SITE UNSPECIFIED: ICD-10-CM

## 2018-02-23 DIAGNOSIS — G62.9 POLYNEUROPATHY, UNSPECIFIED: ICD-10-CM

## 2018-02-23 DIAGNOSIS — I10 ESSENTIAL (PRIMARY) HYPERTENSION: ICD-10-CM

## 2018-02-23 DIAGNOSIS — E87.6 HYPOKALEMIA: ICD-10-CM
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
## 2018-02-23 DIAGNOSIS — G89.29 OTHER CHRONIC PAIN: ICD-10-CM

## 2018-02-23 DIAGNOSIS — M25.552 PAIN IN LEFT HIP: ICD-10-CM

## 2018-03-03 ENCOUNTER — INPATIENT (INPATIENT)
Facility: HOSPITAL | Age: 78
LOS: 2 days | Discharge: NURSING, REHAB OR LONG-TERM CARE | End: 2018-03-06
Payer: MEDICARE

## 2018-03-03 VITALS
OXYGEN SATURATION: 97 % | TEMPERATURE: 98 F | DIASTOLIC BLOOD PRESSURE: 72 MMHG | HEART RATE: 89 BPM | SYSTOLIC BLOOD PRESSURE: 132 MMHG | HEIGHT: 63 IN | WEIGHT: 126.1 LBS | RESPIRATION RATE: 17 BRPM

## 2018-03-03 DIAGNOSIS — Z96.649 PRESENCE OF UNSPECIFIED ARTIFICIAL HIP JOINT: Chronic | ICD-10-CM

## 2018-03-03 LAB
ALBUMIN SERPL ELPH-MCNC: 2.7 G/DL — LOW (ref 3.3–5)
ALP SERPL-CCNC: 89 U/L — SIGNIFICANT CHANGE UP (ref 40–120)
ALT FLD-CCNC: <6 U/L — LOW (ref 12–78)
ANION GAP SERPL CALC-SCNC: 3 MMOL/L — LOW (ref 5–17)
APPEARANCE UR: CLEAR — SIGNIFICANT CHANGE UP
APTT BLD: 32.4 SEC — SIGNIFICANT CHANGE UP (ref 27.5–37.4)
AST SERPL-CCNC: 20 U/L — SIGNIFICANT CHANGE UP (ref 15–37)
BACTERIA # UR AUTO: NEGATIVE — SIGNIFICANT CHANGE UP
BASOPHILS # BLD AUTO: 0.1 K/UL — SIGNIFICANT CHANGE UP (ref 0–0.2)
BASOPHILS NFR BLD AUTO: 1.2 % — SIGNIFICANT CHANGE UP (ref 0–2)
BILIRUB SERPL-MCNC: 0.4 MG/DL — SIGNIFICANT CHANGE UP (ref 0.2–1.2)
BILIRUB UR-MCNC: NEGATIVE — SIGNIFICANT CHANGE UP
BLD GP AB SCN SERPL QL: SIGNIFICANT CHANGE UP
BUN SERPL-MCNC: 21 MG/DL — SIGNIFICANT CHANGE UP (ref 7–23)
CALCIUM SERPL-MCNC: 9 MG/DL — SIGNIFICANT CHANGE UP (ref 8.5–10.1)
CHLORIDE SERPL-SCNC: 106 MMOL/L — SIGNIFICANT CHANGE UP (ref 96–108)
CO2 SERPL-SCNC: 31 MMOL/L — SIGNIFICANT CHANGE UP (ref 22–31)
COLOR SPEC: YELLOW — SIGNIFICANT CHANGE UP
COMMENT - URINE: SIGNIFICANT CHANGE UP
COMMENT - URINE: SIGNIFICANT CHANGE UP
CREAT SERPL-MCNC: 0.51 MG/DL — SIGNIFICANT CHANGE UP (ref 0.5–1.3)
DIFF PNL FLD: (no result)
EOSINOPHIL # BLD AUTO: 0.2 K/UL — SIGNIFICANT CHANGE UP (ref 0–0.5)
EOSINOPHIL NFR BLD AUTO: 2.5 % — SIGNIFICANT CHANGE UP (ref 0–6)
EPI CELLS # UR: SIGNIFICANT CHANGE UP
GLUCOSE SERPL-MCNC: 93 MG/DL — SIGNIFICANT CHANGE UP (ref 70–99)
GLUCOSE UR QL: NEGATIVE MG/DL — SIGNIFICANT CHANGE UP
HCT VFR BLD CALC: 33.1 % — LOW (ref 34.5–45)
HGB BLD-MCNC: 10.7 G/DL — LOW (ref 11.5–15.5)
INR BLD: 1.06 RATIO — SIGNIFICANT CHANGE UP (ref 0.88–1.16)
KETONES UR-MCNC: NEGATIVE — SIGNIFICANT CHANGE UP
LACTATE SERPL-SCNC: 0.6 MMOL/L — LOW (ref 0.7–2)
LEUKOCYTE ESTERASE UR-ACNC: (no result)
LYMPHOCYTES # BLD AUTO: 1.5 K/UL — SIGNIFICANT CHANGE UP (ref 1–3.3)
LYMPHOCYTES # BLD AUTO: 16.6 % — SIGNIFICANT CHANGE UP (ref 13–44)
MCHC RBC-ENTMCNC: 29.9 PG — SIGNIFICANT CHANGE UP (ref 27–34)
MCHC RBC-ENTMCNC: 32.5 GM/DL — SIGNIFICANT CHANGE UP (ref 32–36)
MCV RBC AUTO: 92.2 FL — SIGNIFICANT CHANGE UP (ref 80–100)
MONOCYTES # BLD AUTO: 0.6 K/UL — SIGNIFICANT CHANGE UP (ref 0–0.9)
MONOCYTES NFR BLD AUTO: 6.9 % — SIGNIFICANT CHANGE UP (ref 2–14)
NEUTROPHILS # BLD AUTO: 6.5 K/UL — SIGNIFICANT CHANGE UP (ref 1.8–7.4)
NEUTROPHILS NFR BLD AUTO: 72.7 % — SIGNIFICANT CHANGE UP (ref 43–77)
NITRITE UR-MCNC: NEGATIVE — SIGNIFICANT CHANGE UP
PH UR: 7 — SIGNIFICANT CHANGE UP (ref 5–8)
PLATELET # BLD AUTO: 501 K/UL — HIGH (ref 150–400)
POTASSIUM SERPL-MCNC: 3.9 MMOL/L — SIGNIFICANT CHANGE UP (ref 3.5–5.3)
POTASSIUM SERPL-SCNC: 3.9 MMOL/L — SIGNIFICANT CHANGE UP (ref 3.5–5.3)
PROT SERPL-MCNC: 6.3 GM/DL — SIGNIFICANT CHANGE UP (ref 6–8.3)
PROT UR-MCNC: NEGATIVE MG/DL — SIGNIFICANT CHANGE UP
PROTHROM AB SERPL-ACNC: 11.5 SEC — SIGNIFICANT CHANGE UP (ref 9.8–12.7)
RBC # BLD: 3.59 M/UL — LOW (ref 3.8–5.2)
RBC # FLD: 13.9 % — SIGNIFICANT CHANGE UP (ref 10.3–14.5)
RBC CASTS # UR COMP ASSIST: SIGNIFICANT CHANGE UP /HPF (ref 0–4)
SODIUM SERPL-SCNC: 140 MMOL/L — SIGNIFICANT CHANGE UP (ref 135–145)
SP GR SPEC: 1.01 — SIGNIFICANT CHANGE UP (ref 1.01–1.02)
TYPE + AB SCN PNL BLD: SIGNIFICANT CHANGE UP
UROBILINOGEN FLD QL: NEGATIVE MG/DL — SIGNIFICANT CHANGE UP
WBC # BLD: 9 K/UL — SIGNIFICANT CHANGE UP (ref 3.8–10.5)
WBC # FLD AUTO: 9 K/UL — SIGNIFICANT CHANGE UP (ref 3.8–10.5)
WBC UR QL: SIGNIFICANT CHANGE UP

## 2018-03-03 PROCEDURE — 73502 X-RAY EXAM HIP UNI 2-3 VIEWS: CPT | Mod: 26,LT

## 2018-03-03 PROCEDURE — 71045 X-RAY EXAM CHEST 1 VIEW: CPT | Mod: 26

## 2018-03-03 PROCEDURE — 99285 EMERGENCY DEPT VISIT HI MDM: CPT | Mod: 25

## 2018-03-03 PROCEDURE — 99152 MOD SED SAME PHYS/QHP 5/>YRS: CPT

## 2018-03-03 PROCEDURE — 73552 X-RAY EXAM OF FEMUR 2/>: CPT | Mod: 26

## 2018-03-03 PROCEDURE — 73590 X-RAY EXAM OF LOWER LEG: CPT | Mod: 26,LT

## 2018-03-03 PROCEDURE — 72170 X-RAY EXAM OF PELVIS: CPT | Mod: 26,59

## 2018-03-03 PROCEDURE — 73562 X-RAY EXAM OF KNEE 3: CPT | Mod: 26,LT

## 2018-03-03 RX ORDER — CARBIDOPA AND LEVODOPA 25; 100 MG/1; MG/1
1 TABLET ORAL
Qty: 0 | Refills: 0 | Status: DISCONTINUED | OUTPATIENT
Start: 2018-03-03 | End: 2018-03-06

## 2018-03-03 RX ORDER — MORPHINE SULFATE 50 MG/1
2 CAPSULE, EXTENDED RELEASE ORAL ONCE
Qty: 0 | Refills: 0 | Status: DISCONTINUED | OUTPATIENT
Start: 2018-03-03 | End: 2018-03-06

## 2018-03-03 RX ORDER — GABAPENTIN 400 MG/1
600 CAPSULE ORAL THREE TIMES A DAY
Qty: 0 | Refills: 0 | Status: DISCONTINUED | OUTPATIENT
Start: 2018-03-03 | End: 2018-03-06

## 2018-03-03 RX ORDER — QUETIAPINE FUMARATE 200 MG/1
25 TABLET, FILM COATED ORAL AT BEDTIME
Qty: 0 | Refills: 0 | Status: DISCONTINUED | OUTPATIENT
Start: 2018-03-03 | End: 2018-03-06

## 2018-03-03 RX ORDER — FERROUS SULFATE 325(65) MG
325 TABLET ORAL
Qty: 0 | Refills: 0 | Status: DISCONTINUED | OUTPATIENT
Start: 2018-03-03 | End: 2018-03-06

## 2018-03-03 RX ORDER — OXYCODONE AND ACETAMINOPHEN 5; 325 MG/1; MG/1
1 TABLET ORAL EVERY 6 HOURS
Qty: 0 | Refills: 0 | Status: DISCONTINUED | OUTPATIENT
Start: 2018-03-03 | End: 2018-03-04

## 2018-03-03 RX ORDER — DULOXETINE HYDROCHLORIDE 30 MG/1
30 CAPSULE, DELAYED RELEASE ORAL DAILY
Qty: 0 | Refills: 0 | Status: DISCONTINUED | OUTPATIENT
Start: 2018-03-03 | End: 2018-03-06

## 2018-03-03 RX ORDER — PROPOFOL 10 MG/ML
80 INJECTION, EMULSION INTRAVENOUS ONCE
Qty: 0 | Refills: 0 | Status: COMPLETED | OUTPATIENT
Start: 2018-03-03 | End: 2018-03-03

## 2018-03-03 RX ORDER — SODIUM CHLORIDE 9 MG/ML
3 INJECTION INTRAMUSCULAR; INTRAVENOUS; SUBCUTANEOUS ONCE
Qty: 0 | Refills: 0 | Status: COMPLETED | OUTPATIENT
Start: 2018-03-03 | End: 2018-03-03

## 2018-03-03 RX ORDER — CHOLECALCIFEROL (VITAMIN D3) 125 MCG
2000 CAPSULE ORAL DAILY
Qty: 0 | Refills: 0 | Status: DISCONTINUED | OUTPATIENT
Start: 2018-03-03 | End: 2018-03-04

## 2018-03-03 RX ORDER — ASCORBIC ACID 60 MG
1000 TABLET,CHEWABLE ORAL DAILY
Qty: 0 | Refills: 0 | Status: DISCONTINUED | OUTPATIENT
Start: 2018-03-03 | End: 2018-03-04

## 2018-03-03 RX ORDER — CARBIDOPA AND LEVODOPA 25; 100 MG/1; MG/1
2 TABLET ORAL
Qty: 0 | Refills: 0 | Status: DISCONTINUED | OUTPATIENT
Start: 2018-03-03 | End: 2018-03-06

## 2018-03-03 RX ORDER — ASPIRIN/CALCIUM CARB/MAGNESIUM 324 MG
325 TABLET ORAL
Qty: 0 | Refills: 0 | Status: DISCONTINUED | OUTPATIENT
Start: 2018-03-03 | End: 2018-03-03

## 2018-03-03 RX ORDER — CALCIUM CARBONATE 500(1250)
1 TABLET ORAL DAILY
Qty: 0 | Refills: 0 | Status: DISCONTINUED | OUTPATIENT
Start: 2018-03-03 | End: 2018-03-04

## 2018-03-03 RX ORDER — KETOROLAC TROMETHAMINE 30 MG/ML
15 SYRINGE (ML) INJECTION ONCE
Qty: 0 | Refills: 0 | Status: DISCONTINUED | OUTPATIENT
Start: 2018-03-03 | End: 2018-03-03

## 2018-03-03 RX ORDER — SENNA PLUS 8.6 MG/1
2 TABLET ORAL AT BEDTIME
Qty: 0 | Refills: 0 | Status: DISCONTINUED | OUTPATIENT
Start: 2018-03-03 | End: 2018-03-06

## 2018-03-03 RX ORDER — RASAGILINE 0.5 MG/1
1 TABLET ORAL DAILY
Qty: 0 | Refills: 0 | Status: DISCONTINUED | OUTPATIENT
Start: 2018-03-03 | End: 2018-03-06

## 2018-03-03 RX ORDER — ACETAMINOPHEN 500 MG
650 TABLET ORAL EVERY 6 HOURS
Qty: 0 | Refills: 0 | Status: DISCONTINUED | OUTPATIENT
Start: 2018-03-03 | End: 2018-03-06

## 2018-03-03 RX ORDER — CARBIDOPA AND LEVODOPA 25; 100 MG/1; MG/1
2 TABLET ORAL AT BEDTIME
Qty: 0 | Refills: 0 | Status: DISCONTINUED | OUTPATIENT
Start: 2018-03-03 | End: 2018-03-06

## 2018-03-03 RX ORDER — CLONAZEPAM 1 MG
0.5 TABLET ORAL AT BEDTIME
Qty: 0 | Refills: 0 | Status: DISCONTINUED | OUTPATIENT
Start: 2018-03-03 | End: 2018-03-06

## 2018-03-03 RX ORDER — CARBIDOPA AND LEVODOPA 25; 100 MG/1; MG/1
1.5 TABLET ORAL
Qty: 0 | Refills: 0 | Status: DISCONTINUED | OUTPATIENT
Start: 2018-03-03 | End: 2018-03-06

## 2018-03-03 RX ORDER — PROPOFOL 10 MG/ML
50 INJECTION, EMULSION INTRAVENOUS ONCE
Qty: 0 | Refills: 0 | Status: COMPLETED | OUTPATIENT
Start: 2018-03-03 | End: 2018-03-03

## 2018-03-03 RX ORDER — ENOXAPARIN SODIUM 100 MG/ML
40 INJECTION SUBCUTANEOUS ONCE
Qty: 0 | Refills: 0 | Status: COMPLETED | OUTPATIENT
Start: 2018-03-03 | End: 2018-03-03

## 2018-03-03 RX ORDER — OXYCODONE AND ACETAMINOPHEN 5; 325 MG/1; MG/1
2 TABLET ORAL EVERY 6 HOURS
Qty: 0 | Refills: 0 | Status: DISCONTINUED | OUTPATIENT
Start: 2018-03-03 | End: 2018-03-06

## 2018-03-03 RX ADMIN — Medication 0.5 MILLIGRAM(S): at 21:29

## 2018-03-03 RX ADMIN — PROPOFOL 50 MILLIGRAM(S): 10 INJECTION, EMULSION INTRAVENOUS at 12:46

## 2018-03-03 RX ADMIN — SODIUM CHLORIDE 3 MILLILITER(S): 9 INJECTION INTRAMUSCULAR; INTRAVENOUS; SUBCUTANEOUS at 12:08

## 2018-03-03 RX ADMIN — DULOXETINE HYDROCHLORIDE 30 MILLIGRAM(S): 30 CAPSULE, DELAYED RELEASE ORAL at 16:54

## 2018-03-03 RX ADMIN — QUETIAPINE FUMARATE 25 MILLIGRAM(S): 200 TABLET, FILM COATED ORAL at 21:30

## 2018-03-03 RX ADMIN — Medication 500 MILLIGRAM(S): at 16:53

## 2018-03-03 RX ADMIN — OXYCODONE AND ACETAMINOPHEN 1 TABLET(S): 5; 325 TABLET ORAL at 16:55

## 2018-03-03 RX ADMIN — Medication 1 TABLET(S): at 16:55

## 2018-03-03 RX ADMIN — Medication 325 MILLIGRAM(S): at 16:59

## 2018-03-03 RX ADMIN — CARBIDOPA AND LEVODOPA 2 TABLET(S): 25; 100 TABLET ORAL at 21:30

## 2018-03-03 RX ADMIN — Medication 325 MILLIGRAM(S): at 16:58

## 2018-03-03 RX ADMIN — Medication 2000 UNIT(S): at 16:56

## 2018-03-03 RX ADMIN — SENNA PLUS 2 TABLET(S): 8.6 TABLET ORAL at 21:29

## 2018-03-03 RX ADMIN — PROPOFOL 80 MILLIGRAM(S): 10 INJECTION, EMULSION INTRAVENOUS at 12:46

## 2018-03-03 RX ADMIN — Medication 1 TABLET(S): at 16:54

## 2018-03-03 RX ADMIN — GABAPENTIN 600 MILLIGRAM(S): 400 CAPSULE ORAL at 23:02

## 2018-03-03 RX ADMIN — CARBIDOPA AND LEVODOPA 1 TABLET(S): 25; 100 TABLET ORAL at 16:58

## 2018-03-03 RX ADMIN — ENOXAPARIN SODIUM 40 MILLIGRAM(S): 100 INJECTION SUBCUTANEOUS at 21:28

## 2018-03-03 NOTE — ED PROVIDER NOTE - OBJECTIVE STATEMENT
78 y/o female PMHx of Dementia, Parkinson's, Chronic lower back pain, Neuropathy, HTN, presenting to the ED c/o hip pain. Pt is poor historian, but able to provide some information. Pt has history of dislocated hip on Feb. 14th. Pt had a hip fracture in Nov. 2017, as well as two hip dislocations. Pt history limited secondary to dementia.

## 2018-03-03 NOTE — H&P ADULT - NSHPLABSRESULTS_GEN_ALL_CORE
T(C): --  HR: 89 (18 @ 10:44) (89 - 89)  BP: 132/72 (18 @ 10:44) (132/72 - 132/72)  RR: 17 (18 @ 10:44) (17 - 17)  SpO2: 97% (18 @ 10:44) (97% - 97%)  Wt(kg): --                              10.7   9.0   )-----------( 501      ( 03 Mar 2018 11:44 )             33.1     03 Mar 2018 11:44    140    |  106    |  21     ----------------------------<  93     3.9     |  31     |  0.51     Ca    9.0        03 Mar 2018 11:44    TPro  6.3    /  Alb  2.7    /  TBili  0.4    /  DBili  x      /  AST  20     /  ALT  <6     /  AlkPhos  89     03 Mar 2018 11:44    PT/INR - ( 03 Mar 2018 14:32 )   PT: 11.5 sec;   INR: 1.06 ratio         PTT - ( 03 Mar 2018 14:32 )  PTT:32.4 sec  CAPILLARY BLOOD GLUCOSE        LIVER FUNCTIONS - ( 03 Mar 2018 11:44 )  Alb: 2.7 g/dL / Pro: 6.3 gm/dL / ALK PHOS: 89 U/L / ALT: <6 U/L / AST: 20 U/L / GGT: x           Urinalysis Basic - ( 03 Mar 2018 13:11 )    Color: Yellow / Appearance: Clear / S.015 / pH: x  Gluc: x / Ketone: Negative  / Bili: Negative / Urobili: Negative mg/dL   Blood: x / Protein: Negative mg/dL / Nitrite: Negative   Leuk Esterase: Moderate / RBC: 0-2 /HPF / WBC 3-5   Sq Epi: x / Non Sq Epi: Occasional / Bacteria: Negative

## 2018-03-03 NOTE — ED ADULT NURSE NOTE - CHIEF COMPLAINT QUOTE
Pt BIBA from [facility] for "left hip fx" found on x-ray.  As per EMS, "Pt fell in febuary and had a x-ray done yesterday showing left hip fx." Hx: dementia.

## 2018-03-03 NOTE — ED PROCEDURE NOTE - CPROC ED INFORMED CONSENT1
TO consent with daughter/Benefits, risks, and possible complications of procedure explained to patient/caregiver who verbalized understanding and gave verbal consent.

## 2018-03-03 NOTE — ED ADULT NURSE NOTE - NS ED NURSE REPORT GIVEN TO FT
2N-Pt medicated without difficulty. Unit notified of daughter's request to be called if the OR is early am.

## 2018-03-03 NOTE — H&P ADULT - NSHPPHYSICALEXAM_GEN_ALL_CORE
PHYSICAL EXAM:    Constitutional: NAD, awake and alert, well-developed  HEENT: PERR, EOMI, Normal Hearing, MMM  Neck: Soft and supple, No LAD, No JVD  Respiratory: Breath sounds are clear bilaterally, No wheezing, rales or rhonchi  Cardiovascular: S1 and S2, regular rate and rhythm, no Murmurs, gallops or rubs  Gastrointestinal: Bowel Sounds present, soft, nontender, nondistended, no guarding, no rebound  Extremities: No peripheral edema  Vascular: 2+ peripheral pulses  Neurological: A/O x 2, no focal deficits  Musculoskeletal: 5/5 strength b/l upper and lower extremities  Skin: No rashes

## 2018-03-03 NOTE — H&P ADULT - HISTORY OF PRESENT ILLNESS
77F with hx of Parkinsons, Dementia, Anemia, HTN, Spinal Stenosis, PVD sent from [facility] for left hip pain. patient recent had a left hip arthroplasty on 2/16/18. Patient states she had pain yesterday when working with physical therapy. limited with HPI secondary to Dementia. left hip noted to have periprosthetic dislocation. hip unable to be relocated in the ED despite conscious sedation. denies any pain now. plans for OR in the AM.

## 2018-03-03 NOTE — ED PROVIDER NOTE - NEUROLOGICAL, MLM
Alert and oriented, no focal deficits, no motor or sensory deficits. Confused, no focal deficits, no motor or sensory deficits.

## 2018-03-03 NOTE — H&P ADULT - ASSESSMENT
77F with left hip periprosthetic dislocation    ***Medical Clearance:  -pt is moderate risk for perioperative cardiac complications secondary to advanced age. Patient is medically optimized for an intermediate risk procedure with the following recommendations:      *Left hip periprosthetic dislocation  -admit to medicine  -bed rest  -Ortho eval  -prn pain meds  -fall px  -NPO after midnight  -ASA BID per Ortho for DVT Px      *Parkinsons  -c/w home meds and current schedule  -c/w Rasagiline      *Dementia:  -supportive care      *DVT Px:  -on ASA 325mg PO BID per Ortho, continue

## 2018-03-03 NOTE — CONSULT NOTE ADULT - ASSESSMENT
A/P: 77F w/ L prosthesis dislocation plan for OR closed vs open reduction vs [place]  Pain control  NWB LLE  Medical optimization  NPO for OR  IVF while NPO  Hold all chemical DVT ppx  Plan for OR tomorrow AM 3/4

## 2018-03-03 NOTE — ED PROVIDER NOTE - SKIN, MLM
Left hip has clean, dry and intact incision with mild surrounding erythema, edema and tenderness to palpation.

## 2018-03-03 NOTE — CONSULT NOTE ADULT - SUBJECTIVE AND OBJECTIVE BOX
77F with multiple Hx of L hip dislocation who has not been ambulatory for recent months presents w/ L hip dislocation from nursing home. Pt w/ advanced Parkinsonian dementia and unable to provide details of event. Nursing home does not provide information on events as has occurred on her prior to ED admissions for dislocations in the past 2 months. Pt was recently converted from hemiarthroplasty to JEREMIE on  by Dr. South and this is the first dislocation since the surgery. Pt does not complain of pain elsewhere at this time.     PAST MEDICAL & SURGICAL HISTORY:  No pertinent past medical history  H/O:     MEDICATIONS  (STANDING):  docusate sodium 100 milliGRAM(s) Oral three times a day  lactated ringers. 1000 milliLiter(s) IV Continuous <Continuous>    Allergies  No Known Allergies                          10.0   x     )-----------( x        ( 03 Mar 2018 17:52 )             28.8     03 Mar 2018 11:20    128    |  96     |  3      ----------------------------<  111    4.1     |  22     |  0.45     Ca    7.8        03 Mar 2018 11:20    TPro  6.9    /  Alb  3.8    /  TBili  0.3    /  DBili  x      /  AST  63     /  ALT  63     /  AlkPhos  44     03 Mar 2018 11:20  PT/INR - ( 03 Mar 2018 11:20 )   PT: 9.8 sec;   INR: 0.91 ratio     PTT - ( 03 Mar 2018 11:20 )  PTT:23.9 sec    Vital Signs Last 24 Hrs  T(C): 36.6 (18 @ 17:30), Max: 36.6 (18 @ 17:02)  T(F): 97.8 (18 @ 17:30), Max: 97.9 (18 @ 17:02)  HR: 60 (18 @ 17:30) (60 - 68)  BP: 114/62 (18 @ 17:30) (103/50 - 114/62)  RR: 17 (18 @ 17:30) (17 - 17)  SpO2: 100% (18 @ 17:30) (100% - 100%)    Imaging: XR demonstates posterior dislocation of L hip prosthesis    Physical Exam  General: NAD,  Neurologic: No gross deficits, moving all 4s.    HIPS and PELVIS: Unable to SLR **Hip.     RIGHT LE: Healed scar over hip.  No open skin. Internally Rotated and shortened. No deformities or other signs of trauma at  knee, lower leg, ankle or foot. Full baseline painless ROM at ankle and toes with.SILT toes 1-5. 2+ DP/PT pulses with brisk cap refill distally. Compartments soft and compressible.     Secondary Survey: No TTP over bony prominences, SILT, palpable pulses, full/painless range of motion, compartments soft    A/P: 40y Female with Right** Left** hip prosthesis dislocation s/p Total hip Arthroplasty** Hip Hemiarthroplasty    Procedure:   Closed Reduction under conscious sedation in ED w/ sedation by ED staff  Unsuccessful reduction  Pt NVI post-procedure 77F with multiple Hx of L hip dislocation who has not been ambulatory for recent months presents w/ L hip dislocation from nursing home. Pt w/ advanced Parkinsonian dementia and unable to provide details of event. Nursing home does not provide information on events as has occurred on her prior to ED admissions for dislocations in the past 2 months. Pt was recently converted from hemiarthroplasty to JEREMIE on  by Dr. South and this is the first dislocation since the surgery. Pt does not complain of pain elsewhere at this time.     PAST MEDICAL & SURGICAL HISTORY:  No pertinent past medical history  H/O:     MEDICATIONS  (STANDING):  docusate sodium 100 milliGRAM(s) Oral three times a day  lactated ringers. 1000 milliLiter(s) IV Continuous <Continuous>    Allergies  No Known Allergies                          10.0   x     )-----------( x        ( 03 Mar 2018 17:52 )             28.8     03 Mar 2018 11:20    128    |  96     |  3      ----------------------------<  111    4.1     |  22     |  0.45     Ca    7.8        03 Mar 2018 11:20    TPro  6.9    /  Alb  3.8    /  TBili  0.3    /  DBili  x      /  AST  63     /  ALT  63     /  AlkPhos  44     03 Mar 2018 11:20  PT/INR - ( 03 Mar 2018 11:20 )   PT: 9.8 sec;   INR: 0.91 ratio     PTT - ( 03 Mar 2018 11:20 )  PTT:23.9 sec    Vital Signs Last 24 Hrs  T(C): 36.6 (18 @ 17:30), Max: 36.6 (18 @ 17:02)  T(F): 97.8 (18 @ 17:30), Max: 97.9 (18 @ 17:02)  HR: 60 (18 @ 17:30) (60 - 68)  BP: 114/62 (18 @ 17:30) (103/50 - 114/62)  RR: 17 (18 @ 17:30) (17 - 17)  SpO2: 100% (18 @ 17:30) (100% - 100%)    Imaging: XR demonstates posterior dislocation of L hip prosthesis    Physical Exam  General: NAD,  Neurologic: No gross deficits, moving all 4s.    HIPS and PELVIS: Unable to SLR **Hip.     L LE: Healed scar over hip.  No open skin. Internally Rotated and shortened. No deformities or other signs of trauma at  knee, lower leg, ankle or foot. Full baseline painless ROM at ankle and toes with.SILT toes 1-5. 2+ DP/PT pulses with brisk cap refill distally. Compartments soft and compressible.     Secondary Survey: No TTP over bony prominences, SILT, palpable pulses, full/painless range of motion, compartments soft    A/P: 40y Female with Right** Left** hip prosthesis dislocation s/p Total hip Arthroplasty** Hip Hemiarthroplasty    Procedure:   Closed Reduction under conscious sedation in ED w/ sedation by ED staff  Unsuccessful reduction  Pt NVI post-procedure

## 2018-03-03 NOTE — ED PROVIDER NOTE - PROGRESS NOTE DETAILS
Discussed XR finding with Ortho; Hip dislocation. Discussed XR finding with Ortho; Hip dislocation- Norma Sol. from [facility] , [phone]. unit 2, d/w rn, pt last ate at dinner yesterday 3/2/18. Naldo attempt to call daughter listed on record (Simón) for consent for deep sedation and reduction of left hip prosthetic dislocation,  no answer. MD Olman unable to redice hip under deep sedation. will admit for reduction in the OR tomorrow. MD Naldo

## 2018-03-04 ENCOUNTER — TRANSCRIPTION ENCOUNTER (OUTPATIENT)
Age: 78
End: 2018-03-04

## 2018-03-04 ENCOUNTER — RESULT REVIEW (OUTPATIENT)
Age: 78
End: 2018-03-04

## 2018-03-04 LAB
ANION GAP SERPL CALC-SCNC: 10 MMOL/L — SIGNIFICANT CHANGE UP (ref 5–17)
ANION GAP SERPL CALC-SCNC: 8 MMOL/L — SIGNIFICANT CHANGE UP (ref 5–17)
APTT BLD: 34.1 SEC — SIGNIFICANT CHANGE UP (ref 27.5–37.4)
BUN SERPL-MCNC: 17 MG/DL — SIGNIFICANT CHANGE UP (ref 7–23)
BUN SERPL-MCNC: 19 MG/DL — SIGNIFICANT CHANGE UP (ref 7–23)
CALCIUM SERPL-MCNC: 8.8 MG/DL — SIGNIFICANT CHANGE UP (ref 8.5–10.1)
CALCIUM SERPL-MCNC: 9.2 MG/DL — SIGNIFICANT CHANGE UP (ref 8.5–10.1)
CHLORIDE SERPL-SCNC: 100 MMOL/L — SIGNIFICANT CHANGE UP (ref 96–108)
CHLORIDE SERPL-SCNC: 101 MMOL/L — SIGNIFICANT CHANGE UP (ref 96–108)
CO2 SERPL-SCNC: 25 MMOL/L — SIGNIFICANT CHANGE UP (ref 22–31)
CO2 SERPL-SCNC: 30 MMOL/L — SIGNIFICANT CHANGE UP (ref 22–31)
CREAT SERPL-MCNC: 0.49 MG/DL — LOW (ref 0.5–1.3)
CREAT SERPL-MCNC: 0.56 MG/DL — SIGNIFICANT CHANGE UP (ref 0.5–1.3)
GLUCOSE SERPL-MCNC: 81 MG/DL — SIGNIFICANT CHANGE UP (ref 70–99)
GLUCOSE SERPL-MCNC: 84 MG/DL — SIGNIFICANT CHANGE UP (ref 70–99)
HCT VFR BLD CALC: 35.9 % — SIGNIFICANT CHANGE UP (ref 34.5–45)
HGB BLD-MCNC: 11.8 G/DL — SIGNIFICANT CHANGE UP (ref 11.5–15.5)
INR BLD: 1.1 RATIO — SIGNIFICANT CHANGE UP (ref 0.88–1.16)
MCHC RBC-ENTMCNC: 30.1 PG — SIGNIFICANT CHANGE UP (ref 27–34)
MCHC RBC-ENTMCNC: 32.9 GM/DL — SIGNIFICANT CHANGE UP (ref 32–36)
MCV RBC AUTO: 91.5 FL — SIGNIFICANT CHANGE UP (ref 80–100)
PLATELET # BLD AUTO: 503 K/UL — HIGH (ref 150–400)
POTASSIUM SERPL-MCNC: 3.7 MMOL/L — SIGNIFICANT CHANGE UP (ref 3.5–5.3)
POTASSIUM SERPL-MCNC: 3.7 MMOL/L — SIGNIFICANT CHANGE UP (ref 3.5–5.3)
POTASSIUM SERPL-SCNC: 3.7 MMOL/L — SIGNIFICANT CHANGE UP (ref 3.5–5.3)
POTASSIUM SERPL-SCNC: 3.7 MMOL/L — SIGNIFICANT CHANGE UP (ref 3.5–5.3)
PROTHROM AB SERPL-ACNC: 11.9 SEC — SIGNIFICANT CHANGE UP (ref 9.8–12.7)
RBC # BLD: 3.92 M/UL — SIGNIFICANT CHANGE UP (ref 3.8–5.2)
RBC # FLD: 13.4 % — SIGNIFICANT CHANGE UP (ref 10.3–14.5)
SODIUM SERPL-SCNC: 136 MMOL/L — SIGNIFICANT CHANGE UP (ref 135–145)
SODIUM SERPL-SCNC: 138 MMOL/L — SIGNIFICANT CHANGE UP (ref 135–145)
WBC # BLD: 15.9 K/UL — HIGH (ref 3.8–10.5)
WBC # FLD AUTO: 15.9 K/UL — HIGH (ref 3.8–10.5)

## 2018-03-04 PROCEDURE — 73501 X-RAY EXAM HIP UNI 1 VIEW: CPT | Mod: 26

## 2018-03-04 PROCEDURE — 93010 ELECTROCARDIOGRAM REPORT: CPT

## 2018-03-04 PROCEDURE — 88300 SURGICAL PATH GROSS: CPT | Mod: 26

## 2018-03-04 RX ORDER — CARBIDOPA AND LEVODOPA 25; 100 MG/1; MG/1
1.5 TABLET ORAL DAILY
Qty: 0 | Refills: 0 | Status: DISCONTINUED | OUTPATIENT
Start: 2018-03-04 | End: 2018-03-04

## 2018-03-04 RX ORDER — ACETAMINOPHEN 500 MG
1000 TABLET ORAL ONCE
Qty: 0 | Refills: 0 | Status: COMPLETED | OUTPATIENT
Start: 2018-03-04 | End: 2018-03-04

## 2018-03-04 RX ORDER — SODIUM CHLORIDE 9 MG/ML
1000 INJECTION, SOLUTION INTRAVENOUS
Qty: 0 | Refills: 0 | Status: DISCONTINUED | OUTPATIENT
Start: 2018-03-04 | End: 2018-03-05

## 2018-03-04 RX ORDER — ACETAMINOPHEN 500 MG
650 TABLET ORAL EVERY 6 HOURS
Qty: 0 | Refills: 0 | Status: DISCONTINUED | OUTPATIENT
Start: 2018-03-04 | End: 2018-03-06

## 2018-03-04 RX ORDER — OXYCODONE HYDROCHLORIDE 5 MG/1
5 TABLET ORAL EVERY 4 HOURS
Qty: 0 | Refills: 0 | Status: DISCONTINUED | OUTPATIENT
Start: 2018-03-04 | End: 2018-03-06

## 2018-03-04 RX ORDER — RASAGILINE 0.5 MG/1
1 TABLET ORAL DAILY
Qty: 0 | Refills: 0 | Status: DISCONTINUED | OUTPATIENT
Start: 2018-03-04 | End: 2018-03-04

## 2018-03-04 RX ORDER — CARBIDOPA AND LEVODOPA 25; 100 MG/1; MG/1
2 TABLET ORAL DAILY
Qty: 0 | Refills: 0 | Status: DISCONTINUED | OUTPATIENT
Start: 2018-03-04 | End: 2018-03-04

## 2018-03-04 RX ORDER — FOLIC ACID 0.8 MG
1 TABLET ORAL DAILY
Qty: 0 | Refills: 0 | Status: DISCONTINUED | OUTPATIENT
Start: 2018-03-04 | End: 2018-03-06

## 2018-03-04 RX ORDER — MAGNESIUM HYDROXIDE 400 MG/1
30 TABLET, CHEWABLE ORAL DAILY
Qty: 0 | Refills: 0 | Status: DISCONTINUED | OUTPATIENT
Start: 2018-03-04 | End: 2018-03-06

## 2018-03-04 RX ORDER — PANTOPRAZOLE SODIUM 20 MG/1
40 TABLET, DELAYED RELEASE ORAL DAILY
Qty: 0 | Refills: 0 | Status: DISCONTINUED | OUTPATIENT
Start: 2018-03-04 | End: 2018-03-06

## 2018-03-04 RX ORDER — CHOLECALCIFEROL (VITAMIN D3) 125 MCG
1000 CAPSULE ORAL
Qty: 0 | Refills: 0 | Status: DISCONTINUED | OUTPATIENT
Start: 2018-03-04 | End: 2018-03-06

## 2018-03-04 RX ORDER — CLONAZEPAM 1 MG
0.5 TABLET ORAL AT BEDTIME
Qty: 0 | Refills: 0 | Status: DISCONTINUED | OUTPATIENT
Start: 2018-03-04 | End: 2018-03-04

## 2018-03-04 RX ORDER — FENTANYL CITRATE 50 UG/ML
50 INJECTION INTRAVENOUS
Qty: 0 | Refills: 0 | Status: DISCONTINUED | OUTPATIENT
Start: 2018-03-04 | End: 2018-03-04

## 2018-03-04 RX ORDER — DOCUSATE SODIUM 100 MG
100 CAPSULE ORAL THREE TIMES A DAY
Qty: 0 | Refills: 0 | Status: DISCONTINUED | OUTPATIENT
Start: 2018-03-04 | End: 2018-03-06

## 2018-03-04 RX ORDER — CEFAZOLIN SODIUM 1 G
2000 VIAL (EA) INJECTION EVERY 8 HOURS
Qty: 0 | Refills: 0 | Status: COMPLETED | OUTPATIENT
Start: 2018-03-04 | End: 2018-03-05

## 2018-03-04 RX ORDER — FERROUS SULFATE 325(65) MG
325 TABLET ORAL
Qty: 0 | Refills: 0 | Status: DISCONTINUED | OUTPATIENT
Start: 2018-03-04 | End: 2018-03-04

## 2018-03-04 RX ORDER — SENNA PLUS 8.6 MG/1
2 TABLET ORAL AT BEDTIME
Qty: 0 | Refills: 0 | Status: DISCONTINUED | OUTPATIENT
Start: 2018-03-04 | End: 2018-03-04

## 2018-03-04 RX ORDER — ASPIRIN/CALCIUM CARB/MAGNESIUM 324 MG
325 TABLET ORAL
Qty: 0 | Refills: 0 | Status: DISCONTINUED | OUTPATIENT
Start: 2018-03-05 | End: 2018-03-05

## 2018-03-04 RX ORDER — ASCORBIC ACID 60 MG
500 TABLET,CHEWABLE ORAL
Qty: 0 | Refills: 0 | Status: DISCONTINUED | OUTPATIENT
Start: 2018-03-04 | End: 2018-03-06

## 2018-03-04 RX ORDER — DULOXETINE HYDROCHLORIDE 30 MG/1
30 CAPSULE, DELAYED RELEASE ORAL DAILY
Qty: 0 | Refills: 0 | Status: DISCONTINUED | OUTPATIENT
Start: 2018-03-04 | End: 2018-03-04

## 2018-03-04 RX ORDER — POLYETHYLENE GLYCOL 3350 17 G/17G
17 POWDER, FOR SOLUTION ORAL DAILY
Qty: 0 | Refills: 0 | Status: DISCONTINUED | OUTPATIENT
Start: 2018-03-04 | End: 2018-03-06

## 2018-03-04 RX ORDER — OXYCODONE HYDROCHLORIDE 5 MG/1
10 TABLET ORAL EVERY 4 HOURS
Qty: 0 | Refills: 0 | Status: DISCONTINUED | OUTPATIENT
Start: 2018-03-04 | End: 2018-03-06

## 2018-03-04 RX ORDER — CARBIDOPA AND LEVODOPA 25; 100 MG/1; MG/1
1 TABLET ORAL THREE TIMES A DAY
Qty: 0 | Refills: 0 | Status: DISCONTINUED | OUTPATIENT
Start: 2018-03-04 | End: 2018-03-04

## 2018-03-04 RX ORDER — QUETIAPINE FUMARATE 200 MG/1
25 TABLET, FILM COATED ORAL AT BEDTIME
Qty: 0 | Refills: 0 | Status: DISCONTINUED | OUTPATIENT
Start: 2018-03-04 | End: 2018-03-04

## 2018-03-04 RX ORDER — ONDANSETRON 8 MG/1
4 TABLET, FILM COATED ORAL EVERY 4 HOURS
Qty: 0 | Refills: 0 | Status: DISCONTINUED | OUTPATIENT
Start: 2018-03-04 | End: 2018-03-06

## 2018-03-04 RX ORDER — SODIUM CHLORIDE 9 MG/ML
1000 INJECTION, SOLUTION INTRAVENOUS
Qty: 0 | Refills: 0 | Status: DISCONTINUED | OUTPATIENT
Start: 2018-03-04 | End: 2018-03-04

## 2018-03-04 RX ORDER — CARBIDOPA AND LEVODOPA 25; 100 MG/1; MG/1
2 TABLET ORAL AT BEDTIME
Qty: 0 | Refills: 0 | Status: DISCONTINUED | OUTPATIENT
Start: 2018-03-04 | End: 2018-03-04

## 2018-03-04 RX ORDER — HYDROMORPHONE HYDROCHLORIDE 2 MG/ML
0.5 INJECTION INTRAMUSCULAR; INTRAVENOUS; SUBCUTANEOUS
Qty: 0 | Refills: 0 | Status: DISCONTINUED | OUTPATIENT
Start: 2018-03-04 | End: 2018-03-04

## 2018-03-04 RX ORDER — BENZOCAINE AND MENTHOL 5; 1 G/100ML; G/100ML
1 LIQUID ORAL
Qty: 0 | Refills: 0 | Status: DISCONTINUED | OUTPATIENT
Start: 2018-03-04 | End: 2018-03-06

## 2018-03-04 RX ADMIN — Medication 400 MILLIGRAM(S): at 15:53

## 2018-03-04 RX ADMIN — Medication 1 TABLET(S): at 21:56

## 2018-03-04 RX ADMIN — Medication 325 MILLIGRAM(S): at 09:19

## 2018-03-04 RX ADMIN — CARBIDOPA AND LEVODOPA 2 TABLET(S): 25; 100 TABLET ORAL at 06:15

## 2018-03-04 RX ADMIN — DULOXETINE HYDROCHLORIDE 30 MILLIGRAM(S): 30 CAPSULE, DELAYED RELEASE ORAL at 17:23

## 2018-03-04 RX ADMIN — QUETIAPINE FUMARATE 25 MILLIGRAM(S): 200 TABLET, FILM COATED ORAL at 21:56

## 2018-03-04 RX ADMIN — CARBIDOPA AND LEVODOPA 1 TABLET(S): 25; 100 TABLET ORAL at 17:23

## 2018-03-04 RX ADMIN — CARBIDOPA AND LEVODOPA 1.5 TABLET(S): 25; 100 TABLET ORAL at 09:16

## 2018-03-04 RX ADMIN — OXYCODONE HYDROCHLORIDE 5 MILLIGRAM(S): 5 TABLET ORAL at 22:10

## 2018-03-04 RX ADMIN — Medication 0.5 MILLIGRAM(S): at 21:56

## 2018-03-04 RX ADMIN — CARBIDOPA AND LEVODOPA 2 TABLET(S): 25; 100 TABLET ORAL at 21:57

## 2018-03-04 RX ADMIN — Medication 1000 MILLIGRAM(S): at 16:30

## 2018-03-04 RX ADMIN — SODIUM CHLORIDE 75 MILLILITER(S): 9 INJECTION, SOLUTION INTRAVENOUS at 15:54

## 2018-03-04 RX ADMIN — Medication 100 MILLIGRAM(S): at 21:56

## 2018-03-04 RX ADMIN — SODIUM CHLORIDE 75 MILLILITER(S): 9 INJECTION, SOLUTION INTRAVENOUS at 09:12

## 2018-03-04 RX ADMIN — Medication 325 MILLIGRAM(S): at 17:24

## 2018-03-04 RX ADMIN — GABAPENTIN 600 MILLIGRAM(S): 400 CAPSULE ORAL at 21:56

## 2018-03-04 RX ADMIN — Medication 100 MILLIGRAM(S): at 21:54

## 2018-03-04 RX ADMIN — SENNA PLUS 2 TABLET(S): 8.6 TABLET ORAL at 21:56

## 2018-03-04 RX ADMIN — GABAPENTIN 600 MILLIGRAM(S): 400 CAPSULE ORAL at 06:14

## 2018-03-04 RX ADMIN — RASAGILINE 1 MILLIGRAM(S): 0.5 TABLET ORAL at 17:23

## 2018-03-04 NOTE — PROGRESS NOTE ADULT - SUBJECTIVE AND OBJECTIVE BOX
Pt S/E at bedside, no acute events overnight, pain controlled    Vital Signs Last 24 Hrs  T(C): 36.4 (04 Mar 2018 06:19), Max: 36.8 (03 Mar 2018 19:37)  T(F): 97.5 (04 Mar 2018 06:19), Max: 98.2 (03 Mar 2018 19:37)  HR: 81 (04 Mar 2018 06:19) (77 - 94)  BP: 121/59 (04 Mar 2018 06:19) (121/59 - 154/72)  RR: 18 (04 Mar 2018 06:19) (17 - 18)  SpO2: 98% (04 Mar 2018 06:19) (96% - 100%)    Gen: NAD    Left Lower Extremity:  Skin intact, leg short and internally rotated  Unable to asses motor and sensory 2/2 demntia  +DP/PT Pulses  Compartments soft  No calf TTP B/L

## 2018-03-04 NOTE — PROGRESS NOTE ADULT - SUBJECTIVE AND OBJECTIVE BOX
Post-Op Check:    Pt S/E at bedside, tolerated procedure well, pain controlled    Vital Signs Last 24 Hrs  T(C): 36.5 (04 Mar 2018 17:30), Max: 36.8 (03 Mar 2018 19:37)  T(F): 97.7 (04 Mar 2018 17:30), Max: 98.2 (03 Mar 2018 19:37)  HR: 111 (04 Mar 2018 17:30) (81 - 111)  BP: 98/78 (04 Mar 2018 17:30) (98/78 - 154/71)  RR: 18 (04 Mar 2018 17:30) (14 - 20)  SpO2: 99% (04 Mar 2018 17:30) (96% - 100%)    Gen: NAD    Left Lower Extremity:  Dressing clean dry intact  +EHL/FHL/TA/GS  SILT L3-S1  +DP/PT Pulses  Compartments soft  No calf TTP B/L

## 2018-03-04 NOTE — BRIEF OPERATIVE NOTE - PROCEDURE
<<-----Click on this checkbox to enter Procedure Girdlestone procedure of left hip  03/04/2018    Active  [facility]

## 2018-03-04 NOTE — DISCHARGE NOTE ADULT - HOSPITAL COURSE
Orthopedic Summary  H&P:  Pt is a 77y Female  PAST MEDICAL & SURGICAL HISTORY:  Scoliosis  Neuropathy  Parkinson's disease  Status post hip hemiarthroplasty       Now s/p LEFT Hip Girdlestone for recurrent dislocation. Pt is afebrile with stable vital signs. Pain is controlled. Exam reveals intact EHL FHL TA GS, +DP. Dressing is clean and dry with a New Aquacel bandage on.  Vital Signs Last 24 Hrs  T(C): 36.2 (03-06-18 @ 03:31), Max: 37.2 (03-05-18 @ 11:13)  T(F): 97.2 (03-06-18 @ 03:31), Max: 99 (03-05-18 @ 11:13)  HR: 80 (03-06-18 @ 03:31) (79 - 107)  BP: 106/47 (03-06-18 @ 03:31) (92/42 - 140/47)  BP(mean): 52 (03-05-18 @ 11:13) (52 - 52)  RR: 16 (03-06-18 @ 03:31) (16 - 16)  SpO2: 99% (03-06-18 @ 03:31) (95% - 99%)                        8.2    9.2   )-----------( 331      ( 06 Mar 2018 05:31 )             25.4     PT/INR - ( 06 Mar 2018 05:31 )   PT: 13.1 sec;   INR: 1.21 ratio             Hospital Course:  Patient presented to [facility] ED found to have a hip prosthesis dislocation of unknown duration. She recently had a conversion from jordana to JEREMIE in Feb 2018. Pt was  medically/cardiac cleared prior to surgery. Prophylactic antibiotics were started before the procedure and continued for 24 hours. They were admitted after surgery to the orthopedic floor.  **Post op required **unit PRBC transfusion for acute blood loss anemia.  There were no complications during the hospital stay. All home medications were continued.    Routine consults were obtained from the Anticoagulation Team for DVT/PE prophylaxis, from Physical Therapy for OOB to Chair NWB LLE, and pt was managed by Medicine primarily. Patient was placed on HEPARIN SC and Coumadin for anticoagulation.  Pertinent home medications were continued.  Daily labs were followed.      On POD 0 there were no overnight events. Received daily PT for OOB to chair only, NWB LLE and new Aquacel dressing was applied prior to discharge. The pt will likely need DC to Rehab for ongoing PT, once evaluated and ok per Medicine.  The orthopedic Attending is aware and agrees. See addendum to DC summary per medical team below for any additional info or if any changes. Orthopedic Summary  H&P:  Pt is a 77y Female  PAST MEDICAL & SURGICAL HISTORY:  Scoliosis  Neuropathy  Parkinson's disease  Status post hip hemiarthroplasty       Now s/p LEFT Hip Girdlestone for recurrent dislocation. Pt is afebrile with stable vital signs. Pain is controlled. Exam reveals intact EHL FHL TA GS, +DP. Dressing is clean and dry with a New Aquacel bandage on.  Vital Signs Last 24 Hrs  T(C): 37.2 (06 Mar 2018 13:49), Max: 37.2 (06 Mar 2018 13:49)  T(F): 98.9 (06 Mar 2018 13:49), Max: 98.9 (06 Mar 2018 13:49)  HR: 96 (06 Mar 2018 13:49) (79 - 107)  BP: 121/55 (06 Mar 2018 13:49) (89/61 - 140/47)  BP(mean): --  RR: 17 (06 Mar 2018 13:49) (16 - 17)  SpO2: 100% (06 Mar 2018 13:49) (95% - 100%)               8.2    9.2   )-----------( 331      ( 06 Mar 2018 05:31 )             25.4     PT/INR - ( 06 Mar 2018 05:31 )   PT: 13.1 sec;   INR: 1.21 ratio    Hospital Course:  Patient presented to [facility] [place] ED found to have a hip prosthesis dislocation of unknown duration. She recently had a conversion from jordana to JEREMIE in Feb 2018. Pt was  medically/cardiac cleared prior to surgery. Prophylactic antibiotics were started before the procedure and continued for 24 hours. They were admitted after surgery to the orthopedic floor.  **Post op required 1 unit PRBC transfusion for acute blood loss anemia.  There were no complications during the hospital stay. All home medications were continued.    Routine consults were obtained from the Anticoagulation Team for DVT/PE prophylaxis, from Physical Therapy for OOB to Chair NWB LLE, and pt was managed by Medicine primarily. Patient was placed on HEPARIN SC and Coumadin for anticoagulation.  Pertinent home medications were continued.  Daily labs were followed.      On POD 0 there were no overnight events. Received daily PT for OOB to chair only, NWB LLE and new Aquacel dressing was applied prior to discharge. The pt will likely need DC to Rehab for ongoing PT, once evaluated and ok per Medicine.  The orthopedic Attending is aware and agrees. See addendum to DC summary per medical team below for any additional info or if any changes.    HOSPITALIST ADDENDUM:  pt recieved ivf for dehydration post op  Pt to get 1 unit of prbcs prior to dc.  if vitals stable, patient will be discharged to SNF    1. Recurrent left hip periprosthetic dislocation.  -s/p girdlestone pod#2  2. Acute blood loss anemia:  3. Dehydration  4. Parkinson's  5. Dementia    time taken for dc 75 min

## 2018-03-04 NOTE — DISCHARGE NOTE ADULT - CARE PLAN
Principal Discharge DX:	Hip dislocation, left  Goal:	return to baseline ADLs  Assessment and plan of treatment:	1.	Pain Control  2.	Non weight bearing left lower extremity with assistive devices (walker/Cane as Needed)  3.	DVT Prophylaxis as per anticoagulation team. see med reconciliation.   4.	PT as needed  5.	Follow up with Dr. Armendariz as Outpatient in 10-14 Days after Discharge from the Hospital or Rehab. Call Office For Appointment.   6.	Remove Staples Post-Op Day 14, and Remove Dressing Post-Op Day 10, with Daily Dressing Changes as Need.  7.	Ice/Elevate affected area as Needed  8.	Keep Dressing Clean and dry. Principal Discharge DX:	Hip dislocation, left  Goal:	return to baseline ADLs  Assessment and plan of treatment:	1.	Pain Control  2.	Non weight bearing left lower extremity OOB to chair daily  3.	DVT Prophylaxis as per anticoagulation team. see med reconciliation.   4.	PT as needed  5.	Follow up with Dr. Armendariz as Outpatient in 10-14 Days after Discharge from the Hospital or Rehab. Call Office For Appointment.   6.	Remove Staples Post-Op Day 14 (3/18), with Aquacel or dry sterile gauze/paper tape as Need.  7.	Ice hip for 1-2 weeks   8.	Keep Dressing Clean and dry.

## 2018-03-04 NOTE — DISCHARGE NOTE ADULT - CARE PROVIDER_API CALL
Gallo South (MD), Orthopaedic Surgery  [address] [place] [address]  Phone: [phone]  Fax: [phone]

## 2018-03-04 NOTE — CHART NOTE - NSCHARTNOTEFT_GEN_A_CORE
called by nurse  pt non compliant with dressing  has removed dressing twice  xeroform and sterile 4x4 with tegaderm applied to left hip

## 2018-03-04 NOTE — PROGRESS NOTE ADULT - SUBJECTIVE AND OBJECTIVE BOX
PCP- DR Nelson    CC- Patient is a 77y old  Female who presents with a chief complaint of left hip pain (03 Mar 2018 16:51)    HPI:  77F with hx of Parkinsons, Dementia, Anemia, HTN, Spinal Stenosis, PVD sent from [facility] for left hip pain. patient recent had a left hip arthroplasty on 18. Patient states she had pain yesterday when working with physical therapy. limited with HPI secondary to Dementia. left hip noted to have periprosthetic dislocation. hip unable to be relocated in the ED despite conscious sedation. denies any pain now. plans for OR in the AM. (03 Mar 2018 16:51)    3/4/18- awaiting for OR    Review of system- UTO 2 to dementia    T(C): 36.4 (18 @ 06:19), Max: 36.8 (18 @ 19:37)  HR: 81 (18 @ 06:19) (77 - 94)  BP: 121/59 (18 @ 06:19) (121/59 - 154/72)  RR: 18 (18 @ 06:19) (18 - 18)  SpO2: 98% (18 @ 06:19) (96% - 100%)  Wt(kg): --    LABS:                        10.9   12.0  )-----------( 469      ( 04 Mar 2018 07:02 )             33.5     03-04    138  |  100  |  17  ----------------------------<  84  3.7   |  30  |  0.49<L>    Ca    9.2      04 Mar 2018 07:02  TPro  6.3  /  Alb  2.7<L>  /  TBili  0.4  /  DBili  x   /  AST  20  /  ALT  <6<L>  /  AlkPhos  89  03-03  PT/INR - ( 04 Mar 2018 07:02 )   PT: 11.9 sec;   INR: 1.10 ratio     PTT - ( 04 Mar 2018 07:02 )  PTT:34.1 sec    Urinalysis Basic - ( 03 Mar 2018 13:11 )  Color: Yellow / Appearance: Clear / S.015 / pH: x  Gluc: x / Ketone: Negative  / Bili: Negative / Urobili: Negative mg/dL   Blood: x / Protein: Negative mg/dL / Nitrite: Negative   Leuk Esterase: Moderate / RBC: 0-2 /HPF / WBC 3-5   Sq Epi: x / Non Sq Epi: Occasional / Bacteria: Negative    RADIOLOGY & ADDITIONAL TESTS:  EXAM:  XR FEMUR 2 VIEWS LT                        PROCEDURE DATE:  2018    INTERPRETATION:  Exam Date: 3/3/2018 3:12 PM  Clinical Information: Left hip dislocation  Technique: 4 views of the left femur    Findings:    Persistent dislocation of the left femoral head prosthesis. Persistent   linear density along the lesser trochanter and medial aspect of the   femoral prosthesis unchanged. No definite acute appearing femur   fractures. Partially visualized fracture the proximal left fibula.    Impression:  Persistent dislocation of left femoral head prosthesis without definite   acute fracture in the left femur. Persistent concern for periprosthetic   fracture or left femur fracture, CT can be obtained.  Partially visualized proximal left fibular fracture    PHYSICAL EXAM:  GENERAL: NAD, well-groomed, well-developed  HEAD:  Atraumatic, Normocephalic  EYES: EOMI, PERRLA, conjunctiva and sclera clear  HEENT: Moist mucous membranes  NECK: Supple, No JVD  NERVOUS SYSTEM:  Awake, confused  CHEST/LUNG: Clear to auscultation bilaterally; No rales, rhonchi, wheezing, or rubs  HEART: Regular rate and rhythm; No murmurs, rubs, or gallops  ABDOMEN: Soft, Nontender, Nondistended; Bowel sounds present  GENITOURINARY- Voiding, no palpable bladder  EXTREMITIES:  2+ Peripheral Pulses, No clubbing, cyanosis, or edema  MUSCULOSKELTAL- limited ROM LLE  SKIN-no rash, no lesion      acetaminophen   Tablet. 650 milliGRAM(s) Oral every 6 hours PRN  ascorbic acid 1000 milliGRAM(s) Oral daily  calcium carbonate 1250 mG (OsCal) 1 Tablet(s) Oral daily  carbidopa/levodopa  25/100 1 Tablet(s) Oral <User Schedule>  carbidopa/levodopa  25/100 2 Tablet(s) Oral <User Schedule>  carbidopa/levodopa  25/100 1.5 Tablet(s) Oral <User Schedule>  carbidopa/levodopa CR 25/100 2 Tablet(s) Oral at bedtime  cholecalciferol 2000 Unit(s) Oral daily  clonazePAM Tablet 0.5 milliGRAM(s) Oral at bedtime  DULoxetine 30 milliGRAM(s) Oral daily  ferrous    sulfate 325 milliGRAM(s) Oral two times a day with meals  gabapentin 600 milliGRAM(s) Oral three times a day  lactated ringers. 1000 milliLiter(s) IV Continuous <Continuous>  morphine  - Injectable 2 milliGRAM(s) IV Push once  multivitamin 1 Tablet(s) Oral daily  oxyCODONE    5 mG/acetaminophen 325 mG 1 Tablet(s) Oral every 6 hours PRN  oxyCODONE    5 mG/acetaminophen 325 mG 2 Tablet(s) Oral every 6 hours PRN  QUEtiapine 25 milliGRAM(s) Oral at bedtime  rasagiline Tablet 1 milliGRAM(s) Oral daily  senna 2 Tablet(s) Oral at bedtime    Assessment/Plan  #Left hip periprosthetic dislocation  Ortho eval appreciated  For OR today  Pt is medically optimized  Pain meds prn  DVT proph on hold for OR    #Parkinson's  -c/w home meds and current schedule  -c/w Rasagiline    #Dementia:  -supportive care    #Dispo- medically stable for OR

## 2018-03-04 NOTE — DISCHARGE NOTE ADULT - PLAN OF CARE
return to baseline ADLs 1.	Pain Control  2.	Non weight bearing left lower extremity with assistive devices (walker/Cane as Needed)  3.	DVT Prophylaxis as per anticoagulation team. see med reconciliation.   4.	PT as needed  5.	Follow up with Dr. Armendariz as Outpatient in 10-14 Days after Discharge from the Hospital or Rehab. Call Office For Appointment.   6.	Remove Staples Post-Op Day 14, and Remove Dressing Post-Op Day 10, with Daily Dressing Changes as Need.  7.	Ice/Elevate affected area as Needed  8.	Keep Dressing Clean and dry. 1.	Pain Control  2.	Non weight bearing left lower extremity OOB to chair daily  3.	DVT Prophylaxis as per anticoagulation team. see med reconciliation.   4.	PT as needed  5.	Follow up with Dr. Armendariz as Outpatient in 10-14 Days after Discharge from the Hospital or Rehab. Call Office For Appointment.   6.	Remove Staples Post-Op Day 14 (3/18), with Aquacel or dry sterile gauze/paper tape as Need.  7.	Ice hip for 1-2 weeks   8.	Keep Dressing Clean and dry.

## 2018-03-04 NOTE — DISCHARGE NOTE ADULT - PATIENT PORTAL LINK FT
You can access the [facility] Patient Portal, offered by [facility], by registering with the following website: [email or web address]

## 2018-03-04 NOTE — DISCHARGE NOTE ADULT - MEDICATION SUMMARY - MEDICATIONS TO TAKE
I will START or STAY ON the medications listed below when I get home from the hospital:    oxyCODONE 5 mg oral tablet  -- 1 tab(s) by mouth every 4 hours, As needed, Moderate Pain  -- Indication: For Home med    acetaminophen 325 mg oral tablet  -- 2 tab(s) by mouth every 6 hours, As needed, Mild Pain (1 - 3)  -- Indication: For Per emed    aspirin 325 mg oral delayed release tablet  -- 1 tab(s) by mouth 2 times a day stop after 4/1/18  -- Indication: For dvt px    gabapentin 600 mg oral tablet  -- 1 tab(s) by mouth 3 times a day  -- Indication: For Per emed    clonazePAM 0.5 mg oral tablet  -- 0.5 milligram(s) by mouth once a day (at bedtime)  -- Indication: For Per emed    DULoxetine 30 mg oral delayed release capsule  -- 1 cap(s) by mouth once a day  -- Indication: For Per emed    carbidopa-levodopa 25 mg-100 mg oral tablet, extended release  -- 2 tab(s) by mouth once a day (at bedtime)   -- Indication: For Per emed    rasagiline 1 mg oral tablet  -- 1 tab(s) by mouth once a day  -- Indication: For Per emed    carbidopa-levodopa 25 mg-100 mg oral tablet  -- 1.5 tab(s) by mouth once a day 9 am  -- Indication: For Home med    carbidopa-levodopa 25 mg-100 mg oral tablet  -- 1 tab(s) by mouth 3 times a day 1 pm, 3 pm, and 6 pm   -- Indication: For Per emed    carbidopa-levodopa 25 mg-100 mg oral tablet  -- 2 tab(s) by mouth once a day 6 am  -- Indication: For Per emed    SEROquel 25 mg oral tablet  -- 25 milligram(s) by mouth once a day (at bedtime)  -- Indication: For Per emed    ferrous sulfate 325 mg (65 mg elemental iron) oral tablet  -- 1 tab(s) by mouth 2 times a day  -- Indication: For Home med    senna oral tablet  -- 2 tab(s) by mouth once a day (at bedtime)  -- Indication: For Home med    docusate sodium 100 mg oral capsule  -- 1 cap(s) by mouth 3 times a day  -- Indication: For Home med    polyethylene glycol 3350 oral powder for reconstitution  -- 17 gram(s) by mouth once a day  -- Indication: For constipation    pantoprazole 40 mg oral delayed release tablet  -- 1 tab(s) by mouth once a day  -- Indication: For Homemed    calcium-vitamin D 500 mg-200 intl units oral tablet  -- 1 tab(s) by mouth 3 times a day  -- Indication: For Home med    Multiple Vitamins oral tablet  -- 1 tab(s) by mouth once a day  -- Indication: For Home med    Multiple Vitamins oral tablet  -- 1 tab(s) by mouth once a day  -- Indication: For Home med    ascorbic acid 500 mg oral tablet  -- 1 tab(s) by mouth 2 times a day  -- Indication: For Home med    Vitamin D3 1000 intl units oral tablet  -- 2 tab(s) by mouth once a day  -- Indication: For Homemed

## 2018-03-05 LAB
ANION GAP SERPL CALC-SCNC: 8 MMOL/L — SIGNIFICANT CHANGE UP (ref 5–17)
BUN SERPL-MCNC: 25 MG/DL — HIGH (ref 7–23)
CALCIUM SERPL-MCNC: 8.3 MG/DL — LOW (ref 8.5–10.1)
CHLORIDE SERPL-SCNC: 101 MMOL/L — SIGNIFICANT CHANGE UP (ref 96–108)
CO2 SERPL-SCNC: 28 MMOL/L — SIGNIFICANT CHANGE UP (ref 22–31)
CREAT SERPL-MCNC: 0.61 MG/DL — SIGNIFICANT CHANGE UP (ref 0.5–1.3)
GLUCOSE SERPL-MCNC: 118 MG/DL — HIGH (ref 70–99)
HCT VFR BLD CALC: 27 % — LOW (ref 34.5–45)
HGB BLD-MCNC: 9.1 G/DL — LOW (ref 11.5–15.5)
MCHC RBC-ENTMCNC: 30.6 PG — SIGNIFICANT CHANGE UP (ref 27–34)
MCHC RBC-ENTMCNC: 33.9 GM/DL — SIGNIFICANT CHANGE UP (ref 32–36)
MCV RBC AUTO: 90.1 FL — SIGNIFICANT CHANGE UP (ref 80–100)
PLATELET # BLD AUTO: 419 K/UL — HIGH (ref 150–400)
POTASSIUM SERPL-MCNC: 3.5 MMOL/L — SIGNIFICANT CHANGE UP (ref 3.5–5.3)
POTASSIUM SERPL-SCNC: 3.5 MMOL/L — SIGNIFICANT CHANGE UP (ref 3.5–5.3)
RBC # BLD: 2.99 M/UL — LOW (ref 3.8–5.2)
RBC # FLD: 13.1 % — SIGNIFICANT CHANGE UP (ref 10.3–14.5)
SODIUM SERPL-SCNC: 137 MMOL/L — SIGNIFICANT CHANGE UP (ref 135–145)
SURGICAL PATHOLOGY FINAL REPORT - CH: SIGNIFICANT CHANGE UP
WBC # BLD: 15.3 K/UL — HIGH (ref 3.8–10.5)
WBC # FLD AUTO: 15.3 K/UL — HIGH (ref 3.8–10.5)

## 2018-03-05 PROCEDURE — 93010 ELECTROCARDIOGRAM REPORT: CPT

## 2018-03-05 PROCEDURE — 99223 1ST HOSP IP/OBS HIGH 75: CPT

## 2018-03-05 RX ORDER — WARFARIN SODIUM 2.5 MG/1
5 TABLET ORAL DAILY
Qty: 0 | Refills: 0 | Status: DISCONTINUED | OUTPATIENT
Start: 2018-03-05 | End: 2018-03-06

## 2018-03-05 RX ORDER — SODIUM CHLORIDE 9 MG/ML
1000 INJECTION INTRAMUSCULAR; INTRAVENOUS; SUBCUTANEOUS
Qty: 0 | Refills: 0 | Status: DISCONTINUED | OUTPATIENT
Start: 2018-03-05 | End: 2018-03-05

## 2018-03-05 RX ORDER — HEPARIN SODIUM 5000 [USP'U]/ML
5000 INJECTION INTRAVENOUS; SUBCUTANEOUS EVERY 8 HOURS
Qty: 0 | Refills: 0 | Status: DISCONTINUED | OUTPATIENT
Start: 2018-03-05 | End: 2018-03-06

## 2018-03-05 RX ADMIN — QUETIAPINE FUMARATE 25 MILLIGRAM(S): 200 TABLET, FILM COATED ORAL at 21:51

## 2018-03-05 RX ADMIN — RASAGILINE 1 MILLIGRAM(S): 0.5 TABLET ORAL at 11:04

## 2018-03-05 RX ADMIN — CARBIDOPA AND LEVODOPA 1 TABLET(S): 25; 100 TABLET ORAL at 13:12

## 2018-03-05 RX ADMIN — HEPARIN SODIUM 5000 UNIT(S): 5000 INJECTION INTRAVENOUS; SUBCUTANEOUS at 21:51

## 2018-03-05 RX ADMIN — Medication 500 MILLIGRAM(S): at 06:33

## 2018-03-05 RX ADMIN — OXYCODONE AND ACETAMINOPHEN 2 TABLET(S): 5; 325 TABLET ORAL at 15:20

## 2018-03-05 RX ADMIN — Medication 325 MILLIGRAM(S): at 08:29

## 2018-03-05 RX ADMIN — HEPARIN SODIUM 5000 UNIT(S): 5000 INJECTION INTRAVENOUS; SUBCUTANEOUS at 15:19

## 2018-03-05 RX ADMIN — Medication 1 TABLET(S): at 13:12

## 2018-03-05 RX ADMIN — Medication 100 MILLIGRAM(S): at 05:11

## 2018-03-05 RX ADMIN — Medication 1000 UNIT(S): at 17:41

## 2018-03-05 RX ADMIN — GABAPENTIN 600 MILLIGRAM(S): 400 CAPSULE ORAL at 21:51

## 2018-03-05 RX ADMIN — Medication 325 MILLIGRAM(S): at 17:41

## 2018-03-05 RX ADMIN — CARBIDOPA AND LEVODOPA 2 TABLET(S): 25; 100 TABLET ORAL at 06:26

## 2018-03-05 RX ADMIN — Medication 1 TABLET(S): at 11:05

## 2018-03-05 RX ADMIN — SENNA PLUS 2 TABLET(S): 8.6 TABLET ORAL at 21:51

## 2018-03-05 RX ADMIN — CARBIDOPA AND LEVODOPA 1 TABLET(S): 25; 100 TABLET ORAL at 17:41

## 2018-03-05 RX ADMIN — DULOXETINE HYDROCHLORIDE 30 MILLIGRAM(S): 30 CAPSULE, DELAYED RELEASE ORAL at 11:04

## 2018-03-05 RX ADMIN — Medication 0.5 MILLIGRAM(S): at 21:51

## 2018-03-05 RX ADMIN — SODIUM CHLORIDE 75 MILLILITER(S): 9 INJECTION INTRAMUSCULAR; INTRAVENOUS; SUBCUTANEOUS at 11:02

## 2018-03-05 RX ADMIN — Medication 1 TABLET(S): at 06:35

## 2018-03-05 RX ADMIN — PANTOPRAZOLE SODIUM 40 MILLIGRAM(S): 20 TABLET, DELAYED RELEASE ORAL at 11:04

## 2018-03-05 RX ADMIN — Medication 1 MILLIGRAM(S): at 11:05

## 2018-03-05 RX ADMIN — Medication 1000 UNIT(S): at 06:34

## 2018-03-05 RX ADMIN — CARBIDOPA AND LEVODOPA 1 TABLET(S): 25; 100 TABLET ORAL at 15:19

## 2018-03-05 RX ADMIN — GABAPENTIN 600 MILLIGRAM(S): 400 CAPSULE ORAL at 06:27

## 2018-03-05 RX ADMIN — GABAPENTIN 600 MILLIGRAM(S): 400 CAPSULE ORAL at 15:19

## 2018-03-05 RX ADMIN — Medication 1 TABLET(S): at 21:51

## 2018-03-05 RX ADMIN — Medication 500 MILLIGRAM(S): at 17:41

## 2018-03-05 RX ADMIN — WARFARIN SODIUM 5 MILLIGRAM(S): 2.5 TABLET ORAL at 21:51

## 2018-03-05 RX ADMIN — CARBIDOPA AND LEVODOPA 2 TABLET(S): 25; 100 TABLET ORAL at 21:51

## 2018-03-05 RX ADMIN — CARBIDOPA AND LEVODOPA 1.5 TABLET(S): 25; 100 TABLET ORAL at 08:30

## 2018-03-05 RX ADMIN — Medication 325 MILLIGRAM(S): at 06:26

## 2018-03-05 NOTE — PHYSICAL THERAPY INITIAL EVALUATION ADULT - RANGE OF MOTION EXAMINATION, REHAB EVAL
left LE hip reactive, Knee immobilizer in place for support in maintaining posterior hip precautions

## 2018-03-05 NOTE — PHYSICAL THERAPY INITIAL EVALUATION ADULT - IMPAIRMENTS CONTRIBUTING TO GAIT DEVIATIONS, PT EVAL
impaired balance/cognition/impaired postural control/unable to maintain NWB on the left LE/impaired coordination/decreased flexibility/pain

## 2018-03-05 NOTE — PHYSICAL THERAPY INITIAL EVALUATION ADULT - MODALITIES TREATMENT COMMENTS
The pt was left sitting OOB and in a gerichair at end of tx, in the hallway by the nursing station. Tray in place. The pt was in NAD at end of tx.

## 2018-03-05 NOTE — PROGRESS NOTE ADULT - SUBJECTIVE AND OBJECTIVE BOX
Pt S/E at bedside, pain controlled. Pt dressing reinforced. Bed in hallway for observation.     Vital Signs Last 24 Hrs  T(C): 36.9 (05 Mar 2018 05:30), Max: 36.9 (05 Mar 2018 05:30)  T(F): 98.5 (05 Mar 2018 05:30), Max: 98.5 (05 Mar 2018 05:30)  HR: 97 (05 Mar 2018 05:30) (90 - 111)  BP: 133/60 (05 Mar 2018 05:30) (98/78 - 143/72)  RR: 18 (05 Mar 2018 05:30) (14 - 20)  SpO2: 98% (05 Mar 2018 05:30) (96% - 100%)      Gen: NAD    Left Lower Extremity:  Dressing intact  +EHL/FHL/TA/GS  SILT L3-S1  +DP/PT Pulses  Compartments soft  No calf TTP B/L

## 2018-03-05 NOTE — CONSULT NOTE ADULT - ASSESSMENT
I/P:  76 yo female with recent THR POD#23, was at [facility], c/o inc hip pain, found to have hip dislocation, sent to ER and is now S/P girdlestone procedure of Left hip, THR POD#1.  At that time pt was placed on ASA for DVT/PE prophylaxis, due to h/o falling, however at this time pt is considered at higher risk due to recent THR and yesterdays procedure, Pt will be at rehab, considering pt will be closely observed will place pt on coumadin with heparin SQ bridging      Coumadin 5 mg po daily, adjust per INR, tx plan x 4 weeks  Heparin 5000 units sq q 8 h until INR > 2.0 x 2 days  Daily CBC, BMP, INR  venodynes BLE  INC mobility per PT and ortho    thank you for the consult, will follow I/P:  76 yo female with recent THR POD#23, was at [facility], c/o inc hip pain, found to have hip dislocation, sent to ER and is now S/P girdlestone procedure of Left hip, THR POD#1.  At that time pt was placed on ASA for DVT/PE prophylaxis, due to h/o falling, however at this time pt is considered at higher risk due to recent THR in january and present girdlestone procedure and removal of hip joint , Pt will be at rehab, considering pt will be closely observed will place pt on coumadin with heparin SQ bridging      Coumadin 5 mg po daily, adjust per INR, tx plan x 4 weeks  Heparin 5000 units sq q 8 h until INR > 2.0 x 2 days  Daily CBC, BMP, INR  venodynes BLE  INC mobility per PT and ortho    thank you for the consult, will follow

## 2018-03-05 NOTE — PHYSICAL THERAPY INITIAL EVALUATION ADULT - PERTINENT HX OF CURRENT PROBLEM, REHAB EVAL
77F with hx of Parkinsons, Dementia, Anemia, HTN, Spinal Stenosis, PVD sent from [facility] for left hip pain. patient recently had a left hip arthroplasty on 2/16/18. she reported  pain  when working with physical therapy. limited with HPI secondary to Dementia. left hip noted to have periprosthetic dislocation. hip unable to be relocated in the ED despite conscious sedation. Now s/p Girdlestone

## 2018-03-05 NOTE — CONSULT NOTE ADULT - SUBJECTIVE AND OBJECTIVE BOX
HPI:  this is 77F with hx of Parkinsons, Dementia, Anemia, HTN, Spinal Stenosis, PVD sent from [facility] for left hip pain. patient recent had a left hip arthroplasty on 18. Patient states she had pain yesterday when working with physical therapy. limited with HPI secondary to Dementia. left hip noted to have periprosthetic dislocation. hip unable to be relocated in the ED despite conscious sedation. Now on 2 North S/P Girdlestone procedure, and THR    Patient is a 77y old  Female who presents with a chief complaint of left hip pain (04 Mar 2018 22:03)      Consulted by Dr. South   for VTE prophylaxis, risk stratification, and anticoagulation management.    PAST MEDICAL & SURGICAL HISTORY:  Scoliosis  Neuropathy  Parkinson's disease  Status post hip hemiarthroplasty          CrCl:    Caprini VTE Risk Score:    IMPROVE VTE Risk Score:    [id number] Score:     IMPROVE Bleeding Risk Score    Falls Risk:   High (  )  Mod (  )  Low (  )      FAMILY HISTORY:  Family history of age of death (Mother): mother  of old age  Family history of Parkinson disease (Father)    Denies any personal or familial history of clotting or bleeding disorders.    Allergies    No Known Allergies    Intolerances        REVIEW OF SYSTEMS    (  )Fever	     (  )Constipation	(  )SOB				(  )Headache	(  )Dysuria  (  )Chills	     (  )Melena	(  )Dyspnea present on exertion	                    (  )Dizziness                    (  )Polyuria  (  )Nausea	     (  )Hematochezia	(  )Cough			                    (  )Syncope   	(  )Hematuria  (  )Vomiting    (  )Chest Pain	(  )Wheezing			(  )Weakness  (  )Diarrhea     (  )Palpitations	(  )Anorexia			(  )Myalgia    All other review of systems negative: Yes    Unable to obtain review of systems due to:      PHYSICAL EXAM:    Constitutional: Appears Well    Neurological: A& O x 3    Skin: Warm    Respiratory and Thorax: normal effort; Breath sounds: normal; No rales/wheezing/rhonchi  	  Cardiovascular: S1, S2, regular, NMBR	    Gastrointestinal: BS + x 4Q, nontender	    Genitourinary:  Bladder nondistended, nontender    Musculoskeletal:   General Right:   no muscle/joint tenderness,   normal tone, no joint swelling,   ROM: limited/full	    General Left:   no muscle/joint tenderness,   normal tone, no joint swelling,   ROM: limited/full    Hip:  Right: Dressing CDI; Drain: hemovac ; Type of drng.: serosang.; Amt. of drng: small            Left: Dressing CDI; Drain: hemovac ; Type of drng.: serosang.; Amt. of drng: small      Knee:  Right: Incision: ; Dressing CDI; Drain: hemovac ; Type of drng.: serosang.; Amt. of drng: small               Left: Incision: ; Dressing CDI; Drain: hemovac ; Type of drng.: serosang.; Amt. of drng: small    Shoulder:  Rt: Drsing CDI; Sling/immob. noted; Cap refill good; Radial pulse +; Sensation intact                     Lt: Drsing CDI; Sling/immob. noted; Cap refill good; Radial pulse +; Sensation intact    Lower extrems:   Right: no calf tenderness              negative nubia's sign               + pedal pulses    Left:   no calf tenderness              negative nubia's sign               + pedal pulses                          9.1    15.3  )-----------( 419      ( 05 Mar 2018 06:09 )             27.0       03-    137  |  101  |  25<H>  ----------------------------<  118<H>  3.5   |  28  |  0.61    Ca    8.3<L>      05 Mar 2018 06:09    TPro  6.3  /  Alb  2.7<L>  /  TBili  0.4  /  DBili  x   /  AST  20  /  ALT  <6<L>  /  AlkPhos  89  03-03      PT/INR - ( 04 Mar 2018 07:02 )   PT: 11.9 sec;   INR: 1.10 ratio         PTT - ( 04 Mar 2018 07:02 )  PTT:34.1 sec				    MEDICATIONS  (STANDING):  ascorbic acid 500 milliGRAM(s) Oral two times a day  aspirin enteric coated 325 milliGRAM(s) Oral two times a day  calcium carbonate 1250 mG + Vitamin D (OsCal 500 + D) 1 Tablet(s) Oral three times a day  carbidopa/levodopa  25/100 1 Tablet(s) Oral <User Schedule>  carbidopa/levodopa  25/100 2 Tablet(s) Oral <User Schedule>  carbidopa/levodopa  25/100 1.5 Tablet(s) Oral <User Schedule>  carbidopa/levodopa CR 25/100 2 Tablet(s) Oral at bedtime  cholecalciferol 1000 Unit(s) Oral two times a day  clonazePAM Tablet 0.5 milliGRAM(s) Oral at bedtime  docusate sodium 100 milliGRAM(s) Oral three times a day  DULoxetine 30 milliGRAM(s) Oral daily  ferrous    sulfate 325 milliGRAM(s) Oral two times a day with meals  folic acid 1 milliGRAM(s) Oral daily  gabapentin 600 milliGRAM(s) Oral three times a day  lactated ringers. 1000 milliLiter(s) IV Continuous <Continuous>  lactated ringers. 1000 milliLiter(s) IV Continuous <Continuous>  morphine  - Injectable 2 milliGRAM(s) IV Push once  multivitamin 1 Tablet(s) Oral daily  pantoprazole    Tablet 40 milliGRAM(s) Oral daily  polyethylene glycol 3350 17 Gram(s) Oral daily  QUEtiapine 25 milliGRAM(s) Oral at bedtime  rasagiline Tablet 1 milliGRAM(s) Oral daily  senna 2 Tablet(s) Oral at bedtime          DVT Prophylaxis:  LMWH                   (  )  Heparin SQ           (  )  Coumadin             (  )  Xarelto                  (  )  Eliquis                   (  )  Venodynes           (  )  Ambulation          (  )  UFH                       (  )  Contraindicated  (  ) HPI:  this is 77F with hx of Parkinsons, Dementia, Anemia, HTN, Spinal Stenosis, PVD sent from [facility] for left hip pain. patient recent had a left hip arthroplasty on 18. Patient states she had pain yesterday when working with physical therapy. limited with HPI secondary to Dementia. left hip noted to have periprosthetic dislocation. hip unable to be relocated in the ED despite conscious sedation. Now on 2 North S/P Girdlestone procedure, and THR    Patient is a 77y old  Female who presents with a chief complaint of left hip pain (04 Mar 2018 22:03)      Consulted by Dr. South   for VTE prophylaxis, risk stratification, and anticoagulation management.    PAST MEDICAL & SURGICAL HISTORY:  Scoliosis  Neuropathy  Parkinson's disease  Status post hip hemiarthroplasty          CrCl: 83.3    Caprini VTE Risk Score: #10      IMPROVE Bleeding Risk Score #1.5    Falls Risk:   High (x  )  Mod (  )  Low (  )      FAMILY HISTORY:  Family history of age of death (Mother): mother  of old age  Family history of Parkinson disease (Father)    Denies any personal or familial history of clotting or bleeding disorders.    Allergies    No Known Allergies    Intolerances        REVIEW OF SYSTEMS    (  )Fever	     (  )Constipation	(  )SOB				(  )Headache	(  )Dysuria  (  )Chills	     (  )Melena	(  )Dyspnea present on exertion	                    (  )Dizziness                    (  )Polyuria  (  )Nausea	     (  )Hematochezia	(  )Cough			                    (  )Syncope   	(  )Hematuria  (  )Vomiting    (  )Chest Pain	(  )Wheezing			(  )Weakness  (  )Diarrhea     (  )Palpitations	(  )Anorexia			(  )Myalgia        Unable to obtain review of systems due to: confusion      PHYSICAL EXAM:    Constitutional: Appears Well    Neurological: confusion, agitated    Skin: Warm    Respiratory and Thorax: normal effort; Breath sounds: normal; No rales/wheezing/rhonchi  	  Cardiovascular: S1, S2, regular, NMBR	    Gastrointestinal: BS + x 4Q, nontender	    Genitourinary:  Bladder nondistended, nontender    Musculoskeletal:   General Right:   no muscle/joint tenderness,   normal tone, no joint swelling,   ROM: full	    General Left:   + muscle/joint tenderness,   normal tone, no joint swelling,   ROM: limited    Hip:            Left: Dressing CDI;     Lower extrems:   Right: no calf tenderness              negative nubia's sign               + pedal pulses    Left:   no calf tenderness              negative nubia's sign               + pedal pulses                          9.1    15.3  )-----------( 419      ( 05 Mar 2018 06:09 )             27.0       03-05    137  |  101  |  25<H>  ----------------------------<  118<H>  3.5   |  28  |  0.61    Ca    8.3<L>      05 Mar 2018 06:09    TPro  6.3  /  Alb  2.7<L>  /  TBili  0.4  /  DBili  x   /  AST  20  /  ALT  <6<L>  /  AlkPhos  89  03-03      PT/INR - ( 04 Mar 2018 07:02 )   PT: 11.9 sec;   INR: 1.10 ratio         PTT - ( 04 Mar 2018 07:02 )  PTT:34.1 sec				    MEDICATIONS  (STANDING):  ascorbic acid 500 milliGRAM(s) Oral two times a day  calcium carbonate 1250 mG + Vitamin D (OsCal 500 + D) 1 Tablet(s) Oral three times a day  carbidopa/levodopa  25/100 1 Tablet(s) Oral <User Schedule>  carbidopa/levodopa  25/100 2 Tablet(s) Oral <User Schedule>  carbidopa/levodopa  25/100 1.5 Tablet(s) Oral <User Schedule>  carbidopa/levodopa CR 25/100 2 Tablet(s) Oral at bedtime  cholecalciferol 1000 Unit(s) Oral two times a day  clonazePAM Tablet 0.5 milliGRAM(s) Oral at bedtime  docusate sodium 100 milliGRAM(s) Oral three times a day  DULoxetine 30 milliGRAM(s) Oral daily  ferrous    sulfate 325 milliGRAM(s) Oral two times a day with meals  folic acid 1 milliGRAM(s) Oral daily  gabapentin 600 milliGRAM(s) Oral three times a day  heparin  Injectable 5000 Unit(s) SubCutaneous every 8 hours  lactated ringers. 1000 milliLiter(s) IV Continuous <Continuous>  lactated ringers. 1000 milliLiter(s) IV Continuous <Continuous>  morphine  - Injectable 2 milliGRAM(s) IV Push once  multivitamin 1 Tablet(s) Oral daily  pantoprazole    Tablet 40 milliGRAM(s) Oral daily  polyethylene glycol 3350 17 Gram(s) Oral daily  QUEtiapine 25 milliGRAM(s) Oral at bedtime  rasagiline Tablet 1 milliGRAM(s) Oral daily  senna 2 Tablet(s) Oral at bedtime  warfarin 5 milliGRAM(s) Oral daily      Vital Signs Last 24 Hrs  T(C): 36.9 (18 @ 05:30), Max: 36.9 (18 @ 05:30)  T(F): 98.5 (18 @ 05:30), Max: 98.5 (18 @ 05:30)  HR: 97 (18 @ 05:30) (90 - 111)  BP: 133/60 (18 @ 05:30) (98/78 - 143/72)  BP(mean): --  RR: 18 (18 @ 05:30) (14 - 20)  SpO2: 98% (18 @ 05:30) (96% - 100%)      DVT Prophylaxis:  LMWH                   (  )  Heparin SQ           (x )  Coumadin             ( x)  Xarelto                  (  )  Eliquis                   (  )  Venodynes           ( x)  Ambulation          ( x)  UFH                       (  )  Contraindicated  (  ) HPI:  this is 77F with hx of Parkinsons, Dementia, Anemia, HTN, Spinal Stenosis, PVD sent from [facility] for left hip pain. patient recent had a left hip arthroplasty on 18. Patient states she had pain yesterday when working with physical therapy. limited with HPI secondary to Dementia. left hip noted to have periprosthetic dislocation. hip unable to be relocated in the ED despite conscious sedation. Now on 2 North S/P Girdlestone procedure and removal of left hip hardware.    Patient is a 77y old  Female who presents with a chief complaint of left hip pain (04 Mar 2018 22:03)      Consulted by Dr. South   for VTE prophylaxis, risk stratification, and anticoagulation management.    PAST MEDICAL & SURGICAL HISTORY:  Scoliosis  Neuropathy  Parkinson's disease  Status post hip hemiarthroplasty          CrCl: 83.3    Caprini VTE Risk Score: #10      IMPROVE Bleeding Risk Score #1.5    Falls Risk:   High (x  )  Mod (  )  Low (  )      FAMILY HISTORY:  Family history of age of death (Mother): mother  of old age  Family history of Parkinson disease (Father)    Denies any personal or familial history of clotting or bleeding disorders.    Allergies    No Known Allergies    Intolerances        REVIEW OF SYSTEMS    (  )Fever	     (  )Constipation	(  )SOB				(  )Headache	(  )Dysuria  (  )Chills	     (  )Melena	(  )Dyspnea present on exertion	                    (  )Dizziness                    (  )Polyuria  (  )Nausea	     (  )Hematochezia	(  )Cough			                    (  )Syncope   	(  )Hematuria  (  )Vomiting    (  )Chest Pain	(  )Wheezing			(  )Weakness  (  )Diarrhea     (  )Palpitations	(  )Anorexia			(  )Myalgia        Unable to obtain review of systems due to: confusion      PHYSICAL EXAM:    Constitutional: Appears Well    Neurological: confusion, agitated    Skin: Warm    Respiratory and Thorax: normal effort; Breath sounds: normal; No rales/wheezing/rhonchi  	  Cardiovascular: S1, S2, regular, NMBR	    Gastrointestinal: BS + x 4Q, nontender	    Genitourinary:  Bladder nondistended, nontender    Musculoskeletal:   General Right:   no muscle/joint tenderness,   normal tone, no joint swelling,   ROM: full	    General Left:   + muscle/joint tenderness,   normal tone, no joint swelling,   ROM: limited    Hip:            Left: Dressing CDI;     Lower extrems:   Right: no calf tenderness              negative nubia's sign               + pedal pulses    Left:   no calf tenderness              negative nubia's sign               + pedal pulses                          9.1    15.3  )-----------( 419      ( 05 Mar 2018 06:09 )             27.0       03-05    137  |  101  |  25<H>  ----------------------------<  118<H>  3.5   |  28  |  0.61    Ca    8.3<L>      05 Mar 2018 06:09    TPro  6.3  /  Alb  2.7<L>  /  TBili  0.4  /  DBili  x   /  AST  20  /  ALT  <6<L>  /  AlkPhos  89  03-03      PT/INR - ( 04 Mar 2018 07:02 )   PT: 11.9 sec;   INR: 1.10 ratio         PTT - ( 04 Mar 2018 07:02 )  PTT:34.1 sec				    MEDICATIONS  (STANDING):  ascorbic acid 500 milliGRAM(s) Oral two times a day  calcium carbonate 1250 mG + Vitamin D (OsCal 500 + D) 1 Tablet(s) Oral three times a day  carbidopa/levodopa  25/100 1 Tablet(s) Oral <User Schedule>  carbidopa/levodopa  25/100 2 Tablet(s) Oral <User Schedule>  carbidopa/levodopa  25/100 1.5 Tablet(s) Oral <User Schedule>  carbidopa/levodopa CR 25/100 2 Tablet(s) Oral at bedtime  cholecalciferol 1000 Unit(s) Oral two times a day  clonazePAM Tablet 0.5 milliGRAM(s) Oral at bedtime  docusate sodium 100 milliGRAM(s) Oral three times a day  DULoxetine 30 milliGRAM(s) Oral daily  ferrous    sulfate 325 milliGRAM(s) Oral two times a day with meals  folic acid 1 milliGRAM(s) Oral daily  gabapentin 600 milliGRAM(s) Oral three times a day  heparin  Injectable 5000 Unit(s) SubCutaneous every 8 hours  lactated ringers. 1000 milliLiter(s) IV Continuous <Continuous>  lactated ringers. 1000 milliLiter(s) IV Continuous <Continuous>  morphine  - Injectable 2 milliGRAM(s) IV Push once  multivitamin 1 Tablet(s) Oral daily  pantoprazole    Tablet 40 milliGRAM(s) Oral daily  polyethylene glycol 3350 17 Gram(s) Oral daily  QUEtiapine 25 milliGRAM(s) Oral at bedtime  rasagiline Tablet 1 milliGRAM(s) Oral daily  senna 2 Tablet(s) Oral at bedtime  warfarin 5 milliGRAM(s) Oral daily      Vital Signs Last 24 Hrs  T(C): 36.9 (18 @ 05:30), Max: 36.9 (18 @ 05:30)  T(F): 98.5 (18 @ 05:30), Max: 98.5 (18 @ 05:30)  HR: 97 (18 @ 05:30) (90 - 111)  BP: 133/60 (18 @ 05:30) (98/78 - 143/72)  BP(mean): --  RR: 18 (18 @ 05:30) (14 - 20)  SpO2: 98% (18 @ 05:30) (96% - 100%)      DVT Prophylaxis:  LMWH                   (  )  Heparin SQ           (x )  Coumadin             ( x)  Xarelto                  (  )  Eliquis                   (  )  Venodynes           ( x)  Ambulation          ( x)  UFH                       (  )  Contraindicated  (  )

## 2018-03-05 NOTE — PROGRESS NOTE ADULT - SUBJECTIVE AND OBJECTIVE BOX
PCP- DR Nelson    CC-left hip pain    HPI:  77F with hx of Parkinsons, Dementia, Anemia, HTN, Spinal Stenosis, PVD sent from [facility] for left hip pain. patient recently had a left hip arthroplasty on 2/16/18. she reported  pain  when working with physical therapy. limited with HPI secondary to Dementia. left hip noted to have periprosthetic dislocation. hip unable to be relocated in the ED despite conscious sedation. Now s/p girdlestone    3/5: pt seen and examined this am. Was hungry and thirsty. Pain controlled. No sob/chest pain.    Review of system- UTO 2 to dementia    Vital Signs Last 24 Hrs  T(C): 37.2 (05 Mar 2018 11:13), Max: 37.2 (05 Mar 2018 11:13)  T(F): 99 (05 Mar 2018 11:13), Max: 99 (05 Mar 2018 11:13)  HR: 94 (05 Mar 2018 11:13) (90 - 111)  BP: 92/42 (05 Mar 2018 11:13) (92/42 - 143/72)  BP(mean): 52 (05 Mar 2018 11:13) (52 - 52)  RR: 16 (05 Mar 2018 11:13) (14 - 20)  SpO2: 97% (05 Mar 2018 11:13) (96% - 100%)    PHYSICAL EXAM:    GENERAL: elderly female in bed, NAD  HEAD:  Normocephalic, atraumatic  EYES: EOMI, PERRLA  HEENT:dry mucous membranes  NECK: Supple, No JVD  NERVOUS SYSTEM:  A+OX2, Non focal  CHEST/LUNG: Clear to auscultation bilaterally  HEART: Regular rate and rhythm  ABDOMEN: Soft, Nontender, Nondistended, Bowel sounds present  GENITOURINARY: Voiding, no palpable bladder  EXTREMITIES:   No clubbing, cyanosis, or edema  MUSCULOSKELETAL- LLE dressing intact  SKIN-no rash  LABS:                        9.1    15.3  )-----------( 419      ( 05 Mar 2018 06:09 )             27.0     03-05    137  |  101  |  25<H>  ----------------------------<  118<H>  3.5   |  28  |  0.61    Ca    8.3<L>      05 Mar 2018 06:09      PT/INR - ( 04 Mar 2018 07:02 )   PT: 11.9 sec;   INR: 1.10 ratio         PTT - ( 04 Mar 2018 07:02 )  PTT:34.1 sec      RADIOLOGY & ADDITIONAL TESTS:  EXAM:  XR FEMUR 2 VIEWS LT                        PROCEDURE DATE:  03/03/2018    INTERPRETATION:  Exam Date: 3/3/2018 3:12 PM  Clinical Information: Left hip dislocation  Technique: 4 views of the left femur    Findings:    Persistent dislocation of the left femoral head prosthesis. Persistent   linear density along the lesser trochanter and medial aspect of the   femoral prosthesis unchanged. No definite acute appearing femur   fractures. Partially visualized fracture the proximal left fibula.    Impression:  Persistent dislocation of left femoral head prosthesis without definite   acute fracture in the left femur. Persistent concern for periprosthetic   fracture or left femur fracture, CT can be obtained.  Partially visualized proximal left fibular fracture  MEDICATIONS  (STANDING):  ascorbic acid 500 milliGRAM(s) Oral two times a day  calcium carbonate 1250 mG + Vitamin D (OsCal 500 + D) 1 Tablet(s) Oral three times a day  carbidopa/levodopa  25/100 1 Tablet(s) Oral <User Schedule>  carbidopa/levodopa  25/100 2 Tablet(s) Oral <User Schedule>  carbidopa/levodopa  25/100 1.5 Tablet(s) Oral <User Schedule>  carbidopa/levodopa CR 25/100 2 Tablet(s) Oral at bedtime  cholecalciferol 1000 Unit(s) Oral two times a day  clonazePAM Tablet 0.5 milliGRAM(s) Oral at bedtime  docusate sodium 100 milliGRAM(s) Oral three times a day  DULoxetine 30 milliGRAM(s) Oral daily  ferrous    sulfate 325 milliGRAM(s) Oral two times a day with meals  folic acid 1 milliGRAM(s) Oral daily  gabapentin 600 milliGRAM(s) Oral three times a day  heparin  Injectable 5000 Unit(s) SubCutaneous every 8 hours  morphine  - Injectable 2 milliGRAM(s) IV Push once  multivitamin 1 Tablet(s) Oral daily  pantoprazole    Tablet 40 milliGRAM(s) Oral daily  polyethylene glycol 3350 17 Gram(s) Oral daily  QUEtiapine 25 milliGRAM(s) Oral at bedtime  rasagiline Tablet 1 milliGRAM(s) Oral daily  senna 2 Tablet(s) Oral at bedtime  sodium chloride 0.9%. 1000 milliLiter(s) (75 mL/Hr) IV Continuous <Continuous>  warfarin 5 milliGRAM(s) Oral daily    MEDICATIONS  (PRN):  acetaminophen   Tablet 650 milliGRAM(s) Oral every 6 hours PRN For Temp greater than 38 C (100.4 F)  acetaminophen   Tablet. 650 milliGRAM(s) Oral every 6 hours PRN headache  acetaminophen   Tablet. 650 milliGRAM(s) Oral every 6 hours PRN Mild Pain (1 - 3)  aluminum hydroxide/magnesium hydroxide/simethicone Suspension 30 milliLiter(s) Oral four times a day PRN Indigestion  benzocaine 15 mG/menthol 3.6 mG Lozenge 1 Lozenge Oral every 3 hours PRN Sore Throat  magnesium hydroxide Suspension 30 milliLiter(s) Oral daily PRN Constipation  ondansetron Injectable 4 milliGRAM(s) IV Push every 4 hours PRN Nausea and/or Vomiting  oxyCODONE    5 mG/acetaminophen 325 mG 2 Tablet(s) Oral every 6 hours PRN Severe Pain (7 - 10)  oxyCODONE    IR 5 milliGRAM(s) Oral every 4 hours PRN Mild Pain  oxyCODONE    IR 10 milliGRAM(s) Oral every 4 hours PRN Moderate Pain    Assessment/Plan  77F with hx of Parkinsons, Dementia, Anemia, HTN, Spinal Stenosis, PVD s/p recent left hip arthroplasty on 2/16/18 a/w:      1. Recurrent left hip periprosthetic dislocation.  -s/p girdlestone pod#1  -NWB LLE per ortho notes  -pain control  -physical therapy  -incentive spirometry    2. Acute blood loss anemia:  -likely due to surgery  -no need for transfusion at this time  -repeat h/h in am    3. Dehydration:  -encourage po fluids  -ivf X 1 L    4. Parkinson's  -c/w home meds and current schedule  -c/w Rasagiline    5. Dementia:  -supportive care    6. DVT Px.

## 2018-03-06 VITALS
RESPIRATION RATE: 17 BRPM | TEMPERATURE: 99 F | DIASTOLIC BLOOD PRESSURE: 55 MMHG | SYSTOLIC BLOOD PRESSURE: 121 MMHG | OXYGEN SATURATION: 100 % | HEART RATE: 96 BPM

## 2018-03-06 LAB
ANION GAP SERPL CALC-SCNC: 4 MMOL/L — LOW (ref 5–17)
BUN SERPL-MCNC: 18 MG/DL — SIGNIFICANT CHANGE UP (ref 7–23)
CALCIUM SERPL-MCNC: 8.5 MG/DL — SIGNIFICANT CHANGE UP (ref 8.5–10.1)
CHLORIDE SERPL-SCNC: 105 MMOL/L — SIGNIFICANT CHANGE UP (ref 96–108)
CO2 SERPL-SCNC: 33 MMOL/L — HIGH (ref 22–31)
CREAT SERPL-MCNC: 0.51 MG/DL — SIGNIFICANT CHANGE UP (ref 0.5–1.3)
GLUCOSE SERPL-MCNC: 96 MG/DL — SIGNIFICANT CHANGE UP (ref 70–99)
HCT VFR BLD CALC: 25.4 % — LOW (ref 34.5–45)
HGB BLD-MCNC: 8.2 G/DL — LOW (ref 11.5–15.5)
INR BLD: 1.21 RATIO — HIGH (ref 0.88–1.16)
MCHC RBC-ENTMCNC: 29.3 PG — SIGNIFICANT CHANGE UP (ref 27–34)
MCHC RBC-ENTMCNC: 32.3 GM/DL — SIGNIFICANT CHANGE UP (ref 32–36)
MCV RBC AUTO: 90.7 FL — SIGNIFICANT CHANGE UP (ref 80–100)
PLATELET # BLD AUTO: 331 K/UL — SIGNIFICANT CHANGE UP (ref 150–400)
POTASSIUM SERPL-MCNC: 3.6 MMOL/L — SIGNIFICANT CHANGE UP (ref 3.5–5.3)
POTASSIUM SERPL-SCNC: 3.6 MMOL/L — SIGNIFICANT CHANGE UP (ref 3.5–5.3)
PROTHROM AB SERPL-ACNC: 13.1 SEC — HIGH (ref 9.8–12.7)
RBC # BLD: 2.8 M/UL — LOW (ref 3.8–5.2)
RBC # FLD: 13.2 % — SIGNIFICANT CHANGE UP (ref 10.3–14.5)
SODIUM SERPL-SCNC: 142 MMOL/L — SIGNIFICANT CHANGE UP (ref 135–145)
WBC # BLD: 9.2 K/UL — SIGNIFICANT CHANGE UP (ref 3.8–10.5)
WBC # FLD AUTO: 9.2 K/UL — SIGNIFICANT CHANGE UP (ref 3.8–10.5)

## 2018-03-06 PROCEDURE — 99233 SBSQ HOSP IP/OBS HIGH 50: CPT

## 2018-03-06 RX ORDER — CALCIUM CARBONATE 500(1250)
2 TABLET ORAL
Qty: 0 | Refills: 0 | COMMUNITY

## 2018-03-06 RX ORDER — ACETAMINOPHEN 500 MG
2 TABLET ORAL
Qty: 0 | Refills: 0 | COMMUNITY
Start: 2018-03-06

## 2018-03-06 RX ORDER — ASPIRIN/CALCIUM CARB/MAGNESIUM 324 MG
325 TABLET ORAL
Qty: 0 | Refills: 0 | Status: DISCONTINUED | OUTPATIENT
Start: 2018-03-06 | End: 2018-03-06

## 2018-03-06 RX ORDER — DOCUSATE SODIUM 100 MG
1 CAPSULE ORAL
Qty: 0 | Refills: 0 | COMMUNITY
Start: 2018-03-06

## 2018-03-06 RX ORDER — PANTOPRAZOLE SODIUM 20 MG/1
1 TABLET, DELAYED RELEASE ORAL
Qty: 0 | Refills: 0 | COMMUNITY
Start: 2018-03-06

## 2018-03-06 RX ORDER — POLYETHYLENE GLYCOL 3350 17 G/17G
17 POWDER, FOR SOLUTION ORAL
Qty: 0 | Refills: 0 | COMMUNITY
Start: 2018-03-06

## 2018-03-06 RX ADMIN — Medication 1000 UNIT(S): at 06:23

## 2018-03-06 RX ADMIN — CARBIDOPA AND LEVODOPA 1 TABLET(S): 25; 100 TABLET ORAL at 15:08

## 2018-03-06 RX ADMIN — CARBIDOPA AND LEVODOPA 2 TABLET(S): 25; 100 TABLET ORAL at 06:24

## 2018-03-06 RX ADMIN — Medication 1 TABLET(S): at 06:24

## 2018-03-06 RX ADMIN — HEPARIN SODIUM 5000 UNIT(S): 5000 INJECTION INTRAVENOUS; SUBCUTANEOUS at 06:24

## 2018-03-06 RX ADMIN — CARBIDOPA AND LEVODOPA 1.5 TABLET(S): 25; 100 TABLET ORAL at 08:44

## 2018-03-06 RX ADMIN — Medication 500 MILLIGRAM(S): at 06:24

## 2018-03-06 RX ADMIN — Medication 325 MILLIGRAM(S): at 08:43

## 2018-03-06 RX ADMIN — GABAPENTIN 600 MILLIGRAM(S): 400 CAPSULE ORAL at 06:24

## 2018-03-06 RX ADMIN — Medication 100 MILLIGRAM(S): at 14:05

## 2018-03-06 RX ADMIN — OXYCODONE AND ACETAMINOPHEN 2 TABLET(S): 5; 325 TABLET ORAL at 14:05

## 2018-03-06 RX ADMIN — CARBIDOPA AND LEVODOPA 1 TABLET(S): 25; 100 TABLET ORAL at 12:52

## 2018-03-06 RX ADMIN — Medication 1 TABLET(S): at 14:04

## 2018-03-06 RX ADMIN — GABAPENTIN 600 MILLIGRAM(S): 400 CAPSULE ORAL at 14:05

## 2018-03-06 NOTE — PROGRESS NOTE ADULT - PROVIDER SPECIALTY LIST ADULT
Hospitalist
Orthopedics
Anticoag Management

## 2018-03-06 NOTE — PROGRESS NOTE ADULT - ASSESSMENT
I/P:  76 yo female with recent THR POD#23, was at [facility], c/o inc hip pain, found to have hip dislocation, sent to ER and is now S/P girdlestone procedure of Left hip, THR POD#2.  At that time pt was placed on ASA for DVT/PE prophylaxis, due to h/o falling, however at this time pt is considered at higher risk due to recent THR in january and present girdlestone procedure on 3-4-18 and removal of hip joint , Pt will be at rehab,  Will switch pt to asa 325mg enteric coasted twice a day for four weeks.  Pt is at high risk for falling even in a rehab facility.     dc Coumadin and heparin  aspirin 325mg enteric coated twice a day for four weeks pos procedure.   Daily CBC, BMP, INR  venodynes BLE  INC mobility per PT and ortho     will follow
A/P: 77F s/p L Hip girdlestone POD 2  Analgesia  DVT ppx  NWB , OOBTC  PT/OT  DC planning
A/P: 77F w/ L JEREMIE dislocation, plan for OR today 3/4  Pain control  NWB LLE  NPO for OR  Hold all chemical DVT ppx  IVF while NPO  medical management  Plan for OR 3/4
A/P: 77F w/ chronic L prosthesis dislocations s/p girdlestone  Pain Control  DVT ppx  PT/OT  Incentive Spirometry  Medical management appreciated  DC planning
A/P: 77F w/ chronic L prosthesis dislocations s/p girdlestone POD1  Pain Control  DVT ppx  PT/OT  Incentive Spirometry  Medical management appreciated  DC planning

## 2018-03-06 NOTE — PROGRESS NOTE ADULT - SUBJECTIVE AND OBJECTIVE BOX
HPI:  this is 77F with hx of Parkinsons, Dementia, Anemia, HTN, Spinal Stenosis, PVD sent from [facility] for left hip pain. patient recent had a left hip arthroplasty on 18. Patient states she had pain yesterday when working with physical therapy. limited with HPI secondary to Dementia. left hip noted to have periprosthetic dislocation. hip unable to be relocated in the ED despite conscious sedation. Now on 2 North S/P Girdlestone procedure and removal of left hip hardware.    Patient is a 77y old  Female who presents with a chief complaint of left hip pain (04 Mar 2018 22:03)      Consulted by Dr. South   for VTE prophylaxis, risk stratification, and anticoagulation management.    PAST MEDICAL & SURGICAL HISTORY:  Scoliosis  Neuropathy  Parkinson's disease  Status post hip hemiarthroplasty          CrCl: 83.3    Caprini VTE Risk Score: #10      IMPROVE Bleeding Risk Score #1.5    Falls Risk:   High (x  )  Mod (  )  Low (  )    3-6-18 Pt seen at bedside.  Pt alert to person only.  Spoke with nurse states pt pulled off dsg and hemovac out..  Becomes confused more at night.  Concerned that pt may try to get oob.  Will switch pt to ASA ENTERIC COATED 325MG TWICE A DAY FOR FOUR WEEKS. 	  FAMILY HISTORY:  Family history of age of death (Mother): mother  of old age  Family history of Parkinson disease (Father)    Denies any personal or familial history of clotting or bleeding disorders.    Allergies    No Known Allergies    Intolerances        REVIEW OF SYSTEMS    (  )Fever	     (  )Constipation	(  )SOB				(  )Headache	(  )Dysuria  (  )Chills	     (  )Melena	(  )Dyspnea present on exertion	                    (  )Dizziness                    (  )Polyuria  (  )Nausea	     (  )Hematochezia	(  )Cough			                    (  )Syncope   	(  )Hematuria  (  )Vomiting    (  )Chest Pain	(  )Wheezing			(  )Weakness  (  )Diarrhea     (  )Palpitations	(  )Anorexia			(  )Myalgia        Unable to obtain review of systems due to: confusion      PHYSICAL EXAM:    Constitutional: Appears Well    Neurological: confusion, agitated    Skin: Warm    Respiratory and Thorax: normal effort; Breath sounds: normal; No rales/wheezing/rhonchi  	  Cardiovascular: S1, S2, regular, NMBR	    Gastrointestinal: BS + x 4Q, nontender	    Genitourinary:  Bladder nondistended, nontender    Musculoskeletal:   General Right:   no muscle/joint tenderness,   normal tone, no joint swelling,   ROM: full	    General Left:   + muscle/joint tenderness,   normal tone, no joint swelling,   ROM: limited    Hip:  Left: Dressing CI; Noted swelling and dsg with blood stained.     Lower extrems:   Right: no calf tenderness              negative nubia's sign               + pedal pulses    Left:   no calf tenderness              negative nubia's sign               + pedal pulses                           8.2    9.2   )-----------( 331      ( 06 Mar 2018 05:31 )             25.4       03-06    142  |  105  |  18  ----------------------------<  96  3.6   |  33<H>  |  0.51    Ca    8.5      06 Mar 2018 05:31                         9.1    15.3  )-----------( 419      ( 05 Mar 2018 06:09 )             27.0       03-    137  |  101  |  25<H>  ----------------------------<  118<H>  3.5   |  28  |  0.61    Ca    8.3<L>      05 Mar 2018 06:09    TPro  6.3  /  Alb  2.7<L>  /  TBili  0.4  /  DBili  x   /  AST  20  /  ALT  <6<L>  /  AlkPhos  89  03-03    PT/INR - ( 06 Mar 2018 05:31 )   PT: 13.1 sec;   INR: 1.21 ratio    PT/INR - ( 04 Mar 2018 07:02 )   PT: 11.9 sec;   INR: 1.10 ratio         PTT - ( 04 Mar 2018 07:02 )  PTT:34.1 sec				    MEDICATIONS  (STANDING):  ascorbic acid 500 milliGRAM(s) Oral two times a day  aspirin enteric coated 325 milliGRAM(s) Oral two times a day  calcium carbonate 1250 mG + Vitamin D (OsCal 500 + D) 1 Tablet(s) Oral three times a day  carbidopa/levodopa  25/100 1 Tablet(s) Oral <User Schedule>  carbidopa/levodopa  25/100 2 Tablet(s) Oral <User Schedule>  carbidopa/levodopa  25/100 1.5 Tablet(s) Oral <User Schedule>  carbidopa/levodopa CR 25/100 2 Tablet(s) Oral at bedtime  cholecalciferol 1000 Unit(s) Oral two times a day  clonazePAM Tablet 0.5 milliGRAM(s) Oral at bedtime  docusate sodium 100 milliGRAM(s) Oral three times a day  DULoxetine 30 milliGRAM(s) Oral daily  ferrous    sulfate 325 milliGRAM(s) Oral two times a day with meals  folic acid 1 milliGRAM(s) Oral daily  gabapentin 600 milliGRAM(s) Oral three times a day  morphine  - Injectable 2 milliGRAM(s) IV Push once  multivitamin 1 Tablet(s) Oral daily  pantoprazole    Tablet 40 milliGRAM(s) Oral daily  polyethylene glycol 3350 17 Gram(s) Oral daily  QUEtiapine 25 milliGRAM(s) Oral at bedtime  rasagiline Tablet 1 milliGRAM(s) Oral daily  senna 2 Tablet(s) Oral at bedtime        Vital Signs Last 24 Hrs  T(C): 36.2 (18 @ 03:31), Max: 37.2 (18 @ 11:13)  T(F): 97.2 (18 @ 03:31), Max: 99 (18 @ 11:13)  HR: 80 (18 @ 03:31) (79 - 107)  BP: 106/47 (18 @ 03:31) (92/42 - 140/47)  BP(mean): 52 (18 @ 11:13) (52 - 52)  RR: 16 (18 @ 03:31) (16 - 16)  SpO2: 99% (18 @ 03:31) (95% - 99%)      DVT Prophylaxis:  LMWH                   (  )  Heparin SQ           ( )  Coumadin             ( )  Xarelto                  (  )  Eliquis                   (  )  Venodynes           ( x)  Ambulation          ( x)  UFH                       (  )  Contraindicated  (  )  aspirin              (x)

## 2018-03-06 NOTE — PROGRESS NOTE ADULT - SUBJECTIVE AND OBJECTIVE BOX
PCP- DR Nelson    CC-left hip pain    HPI:  77F with hx of Parkinsons, Dementia, Anemia, HTN, Spinal Stenosis, PVD sent from [facility] for left hip pain. patient recently had a left hip arthroplasty on 2/16/18. she reported  pain  when working with physical therapy. limited with HPI secondary to Dementia. left hip noted to have periprosthetic dislocation. hip unable to be relocated in the ED despite conscious sedation. Now s/p girdlestone    3/6: pt seen and examined this am. Felt well. Pain controlled. No sob/chest pain    Review of system- all 10 systems reviewed and is as above otherwise negative but limited due to dementia    Vital Signs Last 24 Hrs  T(C): 36.7 (06 Mar 2018 10:40), Max: 36.7 (06 Mar 2018 10:40)  T(F): 98 (06 Mar 2018 10:40), Max: 98 (06 Mar 2018 10:40)  HR: 99 (06 Mar 2018 10:40) (79 - 107)  BP: 108/59 (06 Mar 2018 10:40) (89/61 - 140/47)  RR: 17 (06 Mar 2018 10:40) (16 - 17)  SpO2: 97% (06 Mar 2018 10:40) (95% - 99%)    PHYSICAL EXAM:    GENERAL: elderly female in bed, NAD  HEAD:  Normocephalic, atraumatic  EYES: EOMI, PERRLA  HEENT:moist mucous membranes  NECK: Supple, No JVD  NERVOUS SYSTEM:  A+OX2, Non focal  CHEST/LUNG: Clear to auscultation bilaterally  HEART: Regular rate and rhythm  ABDOMEN: Soft, Nontender, Nondistended, Bowel sounds present  GENITOURINARY: Voiding, no palpable bladder  EXTREMITIES:   No clubbing, cyanosis, or edema  MUSCULOSKELETAL- LLE in immobilizer  SKIN-no rash  LABS:                        8.2    9.2   )-----------( 331      ( 06 Mar 2018 05:31 )             25.4     03-06    142  |  105  |  18  ----------------------------<  96  3.6   |  33<H>  |  0.51    Ca    8.5      06 Mar 2018 05:31      PT/INR - ( 06 Mar 2018 05:31 )   PT: 13.1 sec;   INR: 1.21 ratio        RADIOLOGY & ADDITIONAL TESTS:  EXAM:  XR FEMUR 2 VIEWS LT                        PROCEDURE DATE:  03/03/2018    INTERPRETATION:  Exam Date: 3/3/2018 3:12 PM  Clinical Information: Left hip dislocation  Technique: 4 views of the left femur    Findings:    Persistent dislocation of the left femoral head prosthesis. Persistent   linear density along the lesser trochanter and medial aspect of the   femoral prosthesis unchanged. No definite acute appearing femur   fractures. Partially visualized fracture the proximal left fibula.    Impression:  Persistent dislocation of left femoral head prosthesis without definite   acute fracture in the left femur. Persistent concern for periprosthetic   fracture or left femur fracture, CT can be obtained.  Partially visualized proximal left fibular fracture  MEDICATIONS  (STANDING):  ascorbic acid 500 milliGRAM(s) Oral two times a day  aspirin enteric coated 325 milliGRAM(s) Oral two times a day  calcium carbonate 1250 mG + Vitamin D (OsCal 500 + D) 1 Tablet(s) Oral three times a day  carbidopa/levodopa  25/100 1 Tablet(s) Oral <User Schedule>  carbidopa/levodopa  25/100 2 Tablet(s) Oral <User Schedule>  carbidopa/levodopa  25/100 1.5 Tablet(s) Oral <User Schedule>  carbidopa/levodopa CR 25/100 2 Tablet(s) Oral at bedtime  cholecalciferol 1000 Unit(s) Oral two times a day  clonazePAM Tablet 0.5 milliGRAM(s) Oral at bedtime  docusate sodium 100 milliGRAM(s) Oral three times a day  DULoxetine 30 milliGRAM(s) Oral daily  ferrous    sulfate 325 milliGRAM(s) Oral two times a day with meals  folic acid 1 milliGRAM(s) Oral daily  gabapentin 600 milliGRAM(s) Oral three times a day  morphine  - Injectable 2 milliGRAM(s) IV Push once  multivitamin 1 Tablet(s) Oral daily  pantoprazole    Tablet 40 milliGRAM(s) Oral daily  polyethylene glycol 3350 17 Gram(s) Oral daily  QUEtiapine 25 milliGRAM(s) Oral at bedtime  rasagiline Tablet 1 milliGRAM(s) Oral daily  senna 2 Tablet(s) Oral at bedtime    MEDICATIONS  (PRN):  acetaminophen   Tablet 650 milliGRAM(s) Oral every 6 hours PRN For Temp greater than 38 C (100.4 F)  acetaminophen   Tablet. 650 milliGRAM(s) Oral every 6 hours PRN headache  acetaminophen   Tablet. 650 milliGRAM(s) Oral every 6 hours PRN Mild Pain (1 - 3)  aluminum hydroxide/magnesium hydroxide/simethicone Suspension 30 milliLiter(s) Oral four times a day PRN Indigestion  benzocaine 15 mG/menthol 3.6 mG Lozenge 1 Lozenge Oral every 3 hours PRN Sore Throat  bisacodyl Suppository 10 milliGRAM(s) Rectal daily PRN If no bowel movement by POD#2  magnesium hydroxide Suspension 30 milliLiter(s) Oral daily PRN Constipation  ondansetron Injectable 4 milliGRAM(s) IV Push every 4 hours PRN Nausea and/or Vomiting  oxyCODONE    5 mG/acetaminophen 325 mG 2 Tablet(s) Oral every 6 hours PRN Severe Pain (7 - 10)  oxyCODONE    IR 5 milliGRAM(s) Oral every 4 hours PRN Mild Pain  oxyCODONE    IR 10 milliGRAM(s) Oral every 4 hours PRN Moderate Pain    Assessment/Plan  77F with hx of Parkinsons, Dementia, Anemia, HTN, Spinal Stenosis, PVD s/p recent left hip arthroplasty on 2/16/18 a/w:      1. Recurrent left hip periprosthetic dislocation.  -s/p girdlestone pod#2  -NWB LLE per ortho notes  -pain control  -physical therapy  -incentive spirometry    2. Acute blood loss anemia:  -likely due to surgery/partly dilutional  -being transfused 1 unit today    3. Dehydration:  -improved with ivf  -encourage po fluids    4. Parkinson's  -c/w home meds and current schedule  -c/w Rasagiline    5. Dementia:  -supportive care    6. DVT Px.    7. Dispo:  dc planning to SNF if stable after transfusion

## 2018-03-08 DIAGNOSIS — E86.0 DEHYDRATION: ICD-10-CM
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
## 2018-03-08 DIAGNOSIS — T84.021A DISLOCATION OF INTERNAL LEFT HIP PROSTHESIS, INITIAL ENCOUNTER: ICD-10-CM

## 2018-03-08 DIAGNOSIS — Z91.19 PATIENT'S NONCOMPLIANCE WITH OTHER MEDICAL TREATMENT AND REGIMEN: ICD-10-CM
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
## 2018-03-08 DIAGNOSIS — D62 ACUTE POSTHEMORRHAGIC ANEMIA: ICD-10-CM

## 2018-03-08 DIAGNOSIS — F03.90 UNSPECIFIED DEMENTIA WITHOUT BEHAVIORAL DISTURBANCE: ICD-10-CM

## 2018-03-08 DIAGNOSIS — G20 PARKINSON'S DISEASE: ICD-10-CM

## 2018-03-08 NOTE — ED PROCEDURE NOTE - CPROC ED INFORMED CONSENT1
Benefits, risks, and possible complications of procedure explained to patient/caregiver who verbalized understanding and gave verbal consent./from daughter

## 2018-03-09 LAB
CULTURE RESULTS: SIGNIFICANT CHANGE UP
SPECIMEN SOURCE: SIGNIFICANT CHANGE UP

## 2018-04-04 LAB
CULTURE RESULTS: SIGNIFICANT CHANGE UP
SPECIMEN SOURCE: SIGNIFICANT CHANGE UP

## 2018-04-27 ENCOUNTER — EMERGENCY (EMERGENCY)
Facility: HOSPITAL | Age: 78
LOS: 0 days | Discharge: HOME | End: 2018-04-27
Attending: EMERGENCY MEDICINE | Admitting: EMERGENCY MEDICINE
Payer: MEDICARE

## 2018-04-27 VITALS
DIASTOLIC BLOOD PRESSURE: 82 MMHG | OXYGEN SATURATION: 97 % | RESPIRATION RATE: 16 BRPM | SYSTOLIC BLOOD PRESSURE: 138 MMHG | TEMPERATURE: 98 F | HEART RATE: 92 BPM

## 2018-04-27 VITALS
DIASTOLIC BLOOD PRESSURE: 67 MMHG | WEIGHT: 119.93 LBS | HEART RATE: 79 BPM | RESPIRATION RATE: 17 BRPM | OXYGEN SATURATION: 100 % | TEMPERATURE: 98 F | SYSTOLIC BLOOD PRESSURE: 115 MMHG

## 2018-04-27 DIAGNOSIS — G20 PARKINSON'S DISEASE: ICD-10-CM

## 2018-04-27 DIAGNOSIS — M25.552 PAIN IN LEFT HIP: ICD-10-CM

## 2018-04-27 DIAGNOSIS — Z96.649 PRESENCE OF UNSPECIFIED ARTIFICIAL HIP JOINT: Chronic | ICD-10-CM

## 2018-04-27 DIAGNOSIS — Z79.899 OTHER LONG TERM (CURRENT) DRUG THERAPY: ICD-10-CM

## 2018-04-27 DIAGNOSIS — Z91.81 HISTORY OF FALLING: ICD-10-CM

## 2018-04-27 DIAGNOSIS — F02.80 DEMENTIA IN OTHER DISEASES CLASSIFIED ELSEWHERE, UNSPECIFIED SEVERITY, WITHOUT BEHAVIORAL DISTURBANCE, PSYCHOTIC DISTURBANCE, MOOD DISTURBANCE, AND ANXIETY: ICD-10-CM

## 2018-04-27 LAB
ALBUMIN SERPL ELPH-MCNC: 3.2 G/DL — LOW (ref 3.3–5)
ALP SERPL-CCNC: 180 U/L — HIGH (ref 40–120)
ALT FLD-CCNC: 7 U/L — LOW (ref 12–78)
ANION GAP SERPL CALC-SCNC: 9 MMOL/L — SIGNIFICANT CHANGE UP (ref 5–17)
APPEARANCE UR: CLEAR — SIGNIFICANT CHANGE UP
APTT BLD: 29.2 SEC — SIGNIFICANT CHANGE UP (ref 27.5–37.4)
AST SERPL-CCNC: 22 U/L — SIGNIFICANT CHANGE UP (ref 15–37)
BACTERIA # UR AUTO: (no result)
BASOPHILS # BLD AUTO: 0.06 K/UL — SIGNIFICANT CHANGE UP (ref 0–0.2)
BASOPHILS NFR BLD AUTO: 0.9 % — SIGNIFICANT CHANGE UP (ref 0–2)
BILIRUB SERPL-MCNC: 0.4 MG/DL — SIGNIFICANT CHANGE UP (ref 0.2–1.2)
BILIRUB UR-MCNC: NEGATIVE — SIGNIFICANT CHANGE UP
BUN SERPL-MCNC: 19 MG/DL — SIGNIFICANT CHANGE UP (ref 7–23)
CALCIUM SERPL-MCNC: 9.1 MG/DL — SIGNIFICANT CHANGE UP (ref 8.5–10.1)
CHLORIDE SERPL-SCNC: 104 MMOL/L — SIGNIFICANT CHANGE UP (ref 96–108)
CO2 SERPL-SCNC: 29 MMOL/L — SIGNIFICANT CHANGE UP (ref 22–31)
COLOR SPEC: YELLOW — SIGNIFICANT CHANGE UP
CREAT SERPL-MCNC: 0.63 MG/DL — SIGNIFICANT CHANGE UP (ref 0.5–1.3)
DIFF PNL FLD: NEGATIVE — SIGNIFICANT CHANGE UP
EOSINOPHIL # BLD AUTO: 0.08 K/UL — SIGNIFICANT CHANGE UP (ref 0–0.5)
EOSINOPHIL NFR BLD AUTO: 1.2 % — SIGNIFICANT CHANGE UP (ref 0–6)
EPI CELLS # UR: (no result)
GLUCOSE SERPL-MCNC: 82 MG/DL — SIGNIFICANT CHANGE UP (ref 70–99)
GLUCOSE UR QL: NEGATIVE MG/DL — SIGNIFICANT CHANGE UP
HCT VFR BLD CALC: 38.8 % — SIGNIFICANT CHANGE UP (ref 34.5–45)
HGB BLD-MCNC: 12.7 G/DL — SIGNIFICANT CHANGE UP (ref 11.5–15.5)
IMM GRANULOCYTES NFR BLD AUTO: 0.1 % — SIGNIFICANT CHANGE UP (ref 0–1.5)
INR BLD: 1.03 RATIO — SIGNIFICANT CHANGE UP (ref 0.88–1.16)
KETONES UR-MCNC: NEGATIVE — SIGNIFICANT CHANGE UP
LEUKOCYTE ESTERASE UR-ACNC: (no result)
LYMPHOCYTES # BLD AUTO: 1.93 K/UL — SIGNIFICANT CHANGE UP (ref 1–3.3)
LYMPHOCYTES # BLD AUTO: 28.8 % — SIGNIFICANT CHANGE UP (ref 13–44)
MCHC RBC-ENTMCNC: 31 PG — SIGNIFICANT CHANGE UP (ref 27–34)
MCHC RBC-ENTMCNC: 32.7 GM/DL — SIGNIFICANT CHANGE UP (ref 32–36)
MCV RBC AUTO: 94.6 FL — SIGNIFICANT CHANGE UP (ref 80–100)
MONOCYTES # BLD AUTO: 0.65 K/UL — SIGNIFICANT CHANGE UP (ref 0–0.9)
MONOCYTES NFR BLD AUTO: 9.7 % — SIGNIFICANT CHANGE UP (ref 2–14)
NEUTROPHILS # BLD AUTO: 3.97 K/UL — SIGNIFICANT CHANGE UP (ref 1.8–7.4)
NEUTROPHILS NFR BLD AUTO: 59.3 % — SIGNIFICANT CHANGE UP (ref 43–77)
NITRITE UR-MCNC: NEGATIVE — SIGNIFICANT CHANGE UP
NRBC # BLD: 0 /100 WBCS — SIGNIFICANT CHANGE UP (ref 0–0)
PH UR: 6 — SIGNIFICANT CHANGE UP (ref 5–8)
PLATELET # BLD AUTO: 293 K/UL — SIGNIFICANT CHANGE UP (ref 150–400)
POTASSIUM SERPL-MCNC: 3.6 MMOL/L — SIGNIFICANT CHANGE UP (ref 3.5–5.3)
POTASSIUM SERPL-SCNC: 3.6 MMOL/L — SIGNIFICANT CHANGE UP (ref 3.5–5.3)
PROT SERPL-MCNC: 6.9 GM/DL — SIGNIFICANT CHANGE UP (ref 6–8.3)
PROT UR-MCNC: NEGATIVE MG/DL — SIGNIFICANT CHANGE UP
PROTHROM AB SERPL-ACNC: 11.1 SEC — SIGNIFICANT CHANGE UP (ref 9.8–12.7)
RBC # BLD: 4.1 M/UL — SIGNIFICANT CHANGE UP (ref 3.8–5.2)
RBC # FLD: 13.1 % — SIGNIFICANT CHANGE UP (ref 10.3–14.5)
RBC CASTS # UR COMP ASSIST: SIGNIFICANT CHANGE UP /HPF (ref 0–4)
SODIUM SERPL-SCNC: 142 MMOL/L — SIGNIFICANT CHANGE UP (ref 135–145)
SP GR SPEC: 1.01 — SIGNIFICANT CHANGE UP (ref 1.01–1.02)
UROBILINOGEN FLD QL: NEGATIVE MG/DL — SIGNIFICANT CHANGE UP
WBC # BLD: 6.7 K/UL — SIGNIFICANT CHANGE UP (ref 3.8–10.5)
WBC # FLD AUTO: 6.7 K/UL — SIGNIFICANT CHANGE UP (ref 3.8–10.5)
WBC UR QL: SIGNIFICANT CHANGE UP

## 2018-04-27 PROCEDURE — 99285 EMERGENCY DEPT VISIT HI MDM: CPT

## 2018-04-27 PROCEDURE — 72125 CT NECK SPINE W/O DYE: CPT | Mod: 26

## 2018-04-27 PROCEDURE — 71045 X-RAY EXAM CHEST 1 VIEW: CPT | Mod: 26

## 2018-04-27 PROCEDURE — 73501 X-RAY EXAM HIP UNI 1 VIEW: CPT | Mod: 26

## 2018-04-27 PROCEDURE — 93010 ELECTROCARDIOGRAM REPORT: CPT

## 2018-04-27 PROCEDURE — 70450 CT HEAD/BRAIN W/O DYE: CPT | Mod: 26

## 2018-04-27 NOTE — ED PROVIDER NOTE - CONSTITUTIONAL, MLM
normal... Well appearing, well nourished, awake, alert, oriented to person, place, time/situation and in no apparent distress. Well appearing, well nourished, awake, A&O x2, and in no apparent distress.

## 2018-04-27 NOTE — ED PROVIDER NOTE - ENMT, MLM
Airway patent, Nasal mucosa clear. Mouth with +dry mucosa. Throat has no vesicles, no oropharyngeal exudates and uvula is midline.

## 2018-04-27 NOTE — ED PROVIDER NOTE - PROGRESS NOTE DETAILS
Case discussed with case management Rosemarie at this time, will reassessment. ortho evaluated image does not have dislocation after humeral head replacement expected for femur to ride posteriorly with ambulation with left side. Attending Bridges, case management successful help to place pt in another facility.

## 2018-04-27 NOTE — ED PROVIDER NOTE - PSYCHIATRIC, MLM
Alert and oriented to person, place, time/situation. normal mood and affect. no apparent risk to self or others. A&O x2. no apparent risk to self or others.

## 2018-04-27 NOTE — ED ADULT NURSE REASSESSMENT NOTE - NS ED NURSE REASSESS COMMENT FT1
Pt tolerated food well. Awaiting further placement at this time. Sleeping comfortably in bed and moved closer to the nursing station for safety. Will cont to monitor.

## 2018-04-27 NOTE — ED ADULT TRIAGE NOTE - CHIEF COMPLAINT QUOTE
pt presents to ED due to fall pt was found by staff on found as per pt she was doing  her morning exercises on the fall pt has no complaints
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
## 2018-04-27 NOTE — ED ADULT NURSE NOTE - OBJECTIVE STATEMENT
Pt BIBA with report of possible fall. As per pt, she was lying on the ground to do exercises and was unable to get back to bed. As per facility and daughter, pt has fallen twice in the last week. Pt denies any pain at this time, but noted to have tenderness to an area in the back of her head.  Pt poor historian. Pt left hip deformity noted, but daughter reports that this is pt's new normal after hip surgery removed some of her hardware.  +PPx4.

## 2018-04-29 LAB
-  AMIKACIN: SIGNIFICANT CHANGE UP
-  AMIKACIN: SIGNIFICANT CHANGE UP
-  AMOXICILLIN/CLAVULANIC ACID: SIGNIFICANT CHANGE UP
-  AMOXICILLIN/CLAVULANIC ACID: SIGNIFICANT CHANGE UP
-  AMPICILLIN/SULBACTAM: SIGNIFICANT CHANGE UP
-  AMPICILLIN/SULBACTAM: SIGNIFICANT CHANGE UP
-  AMPICILLIN: SIGNIFICANT CHANGE UP
-  AMPICILLIN: SIGNIFICANT CHANGE UP
-  AZTREONAM: SIGNIFICANT CHANGE UP
-  AZTREONAM: SIGNIFICANT CHANGE UP
-  CEFAZOLIN: SIGNIFICANT CHANGE UP
-  CEFAZOLIN: SIGNIFICANT CHANGE UP
-  CEFEPIME: SIGNIFICANT CHANGE UP
-  CEFEPIME: SIGNIFICANT CHANGE UP
-  CEFOXITIN: SIGNIFICANT CHANGE UP
-  CEFOXITIN: SIGNIFICANT CHANGE UP
-  CEFTRIAXONE: SIGNIFICANT CHANGE UP
-  CEFTRIAXONE: SIGNIFICANT CHANGE UP
-  CIPROFLOXACIN: SIGNIFICANT CHANGE UP
-  CIPROFLOXACIN: SIGNIFICANT CHANGE UP
-  ERTAPENEM: SIGNIFICANT CHANGE UP
-  ERTAPENEM: SIGNIFICANT CHANGE UP
-  GENTAMICIN: SIGNIFICANT CHANGE UP
-  GENTAMICIN: SIGNIFICANT CHANGE UP
-  IMIPENEM: SIGNIFICANT CHANGE UP
-  LEVOFLOXACIN: SIGNIFICANT CHANGE UP
-  LEVOFLOXACIN: SIGNIFICANT CHANGE UP
-  MEROPENEM: SIGNIFICANT CHANGE UP
-  MEROPENEM: SIGNIFICANT CHANGE UP
-  NITROFURANTOIN: SIGNIFICANT CHANGE UP
-  NITROFURANTOIN: SIGNIFICANT CHANGE UP
-  PIPERACILLIN/TAZOBACTAM: SIGNIFICANT CHANGE UP
-  PIPERACILLIN/TAZOBACTAM: SIGNIFICANT CHANGE UP
-  TIGECYCLINE: SIGNIFICANT CHANGE UP
-  TOBRAMYCIN: SIGNIFICANT CHANGE UP
-  TOBRAMYCIN: SIGNIFICANT CHANGE UP
-  TRIMETHOPRIM/SULFAMETHOXAZOLE: SIGNIFICANT CHANGE UP
-  TRIMETHOPRIM/SULFAMETHOXAZOLE: SIGNIFICANT CHANGE UP
CULTURE RESULTS: SIGNIFICANT CHANGE UP
METHOD TYPE: SIGNIFICANT CHANGE UP
METHOD TYPE: SIGNIFICANT CHANGE UP
ORGANISM # SPEC MICROSCOPIC CNT: SIGNIFICANT CHANGE UP
SPECIMEN SOURCE: SIGNIFICANT CHANGE UP

## 2018-04-30 RX ORDER — CEPHALEXIN 500 MG
1 CAPSULE ORAL
Qty: 14 | Refills: 0 | OUTPATIENT
Start: 2018-04-30 | End: 2018-05-06

## 2018-04-30 NOTE — ED POST DISCHARGE NOTE - RESULT SUMMARY
+urine culture, sent Keflex.  Patient now lives at [facility] in [place] [phone], who is aware she needs abx for urine and requested it be sent to Charlette Adan PA-C

## 2018-09-07 NOTE — ED PROCEDURE NOTE - CPROC ED INDICATIONS1
Patient identified by name and  with verbal feedback. Depo-Testosterone 200 mg administered IM to Right upper outer quadrant using aseptic technique. Patient tolerated well, no adverse reactions noted/reported. Next injection due in 14 days and has been scheduled.    severe pain

## 2018-11-08 NOTE — ED PROCEDURE NOTE - CPROC ED ASA PS STATUS1
Patient discharged via wheelchair to POV, accompanied by daughters. NAD. Family members verbalized understanding of all discharge instructions and opportunity for questions offered. Home medications reviewed. Patient armband removed and shredded ASA PS 3: Severe systemic disease

## 2018-12-02 ENCOUNTER — INPATIENT (INPATIENT)
Facility: HOSPITAL | Age: 78
LOS: 1 days | Discharge: HOME | End: 2018-12-04
Attending: INTERNAL MEDICINE | Admitting: INTERNAL MEDICINE
Payer: MEDICARE

## 2018-12-02 VITALS — HEIGHT: 63 IN | WEIGHT: 113.98 LBS

## 2018-12-02 DIAGNOSIS — Z96.649 PRESENCE OF UNSPECIFIED ARTIFICIAL HIP JOINT: Chronic | ICD-10-CM

## 2018-12-02 PROBLEM — M41.9 SCOLIOSIS, UNSPECIFIED: Chronic | Status: ACTIVE | Noted: 2017-09-12

## 2018-12-02 PROBLEM — G20 PARKINSON'S DISEASE: Chronic | Status: ACTIVE | Noted: 2017-09-12

## 2018-12-02 PROBLEM — G62.9 POLYNEUROPATHY, UNSPECIFIED: Chronic | Status: ACTIVE | Noted: 2017-09-12

## 2018-12-02 LAB
ANION GAP SERPL CALC-SCNC: 6 MMOL/L — SIGNIFICANT CHANGE UP (ref 5–17)
BASOPHILS # BLD AUTO: 0.07 K/UL — SIGNIFICANT CHANGE UP (ref 0–0.2)
BASOPHILS NFR BLD AUTO: 1.2 % — SIGNIFICANT CHANGE UP (ref 0–2)
BUN SERPL-MCNC: 24 MG/DL — HIGH (ref 7–23)
CALCIUM SERPL-MCNC: 9.5 MG/DL — SIGNIFICANT CHANGE UP (ref 8.5–10.1)
CHLORIDE SERPL-SCNC: 104 MMOL/L — SIGNIFICANT CHANGE UP (ref 96–108)
CO2 SERPL-SCNC: 31 MMOL/L — SIGNIFICANT CHANGE UP (ref 22–31)
CREAT SERPL-MCNC: 0.7 MG/DL — SIGNIFICANT CHANGE UP (ref 0.5–1.3)
EOSINOPHIL # BLD AUTO: 0.04 K/UL — SIGNIFICANT CHANGE UP (ref 0–0.5)
EOSINOPHIL NFR BLD AUTO: 0.7 % — SIGNIFICANT CHANGE UP (ref 0–6)
GLUCOSE SERPL-MCNC: 102 MG/DL — HIGH (ref 70–99)
HCT VFR BLD CALC: 38.3 % — SIGNIFICANT CHANGE UP (ref 34.5–45)
HGB BLD-MCNC: 12.5 G/DL — SIGNIFICANT CHANGE UP (ref 11.5–15.5)
IMM GRANULOCYTES NFR BLD AUTO: 0.2 % — SIGNIFICANT CHANGE UP (ref 0–1.5)
LYMPHOCYTES # BLD AUTO: 1.24 K/UL — SIGNIFICANT CHANGE UP (ref 1–3.3)
LYMPHOCYTES # BLD AUTO: 20.8 % — SIGNIFICANT CHANGE UP (ref 13–44)
MCHC RBC-ENTMCNC: 32 PG — SIGNIFICANT CHANGE UP (ref 27–34)
MCHC RBC-ENTMCNC: 32.6 GM/DL — SIGNIFICANT CHANGE UP (ref 32–36)
MCV RBC AUTO: 98 FL — SIGNIFICANT CHANGE UP (ref 80–100)
MONOCYTES # BLD AUTO: 0.54 K/UL — SIGNIFICANT CHANGE UP (ref 0–0.9)
MONOCYTES NFR BLD AUTO: 9.1 % — SIGNIFICANT CHANGE UP (ref 2–14)
NEUTROPHILS # BLD AUTO: 4.05 K/UL — SIGNIFICANT CHANGE UP (ref 1.8–7.4)
NEUTROPHILS NFR BLD AUTO: 68 % — SIGNIFICANT CHANGE UP (ref 43–77)
NRBC # BLD: 0 /100 WBCS — SIGNIFICANT CHANGE UP (ref 0–0)
PLATELET # BLD AUTO: 295 K/UL — SIGNIFICANT CHANGE UP (ref 150–400)
POTASSIUM SERPL-MCNC: 3.6 MMOL/L — SIGNIFICANT CHANGE UP (ref 3.5–5.3)
POTASSIUM SERPL-SCNC: 3.6 MMOL/L — SIGNIFICANT CHANGE UP (ref 3.5–5.3)
RBC # BLD: 3.91 M/UL — SIGNIFICANT CHANGE UP (ref 3.8–5.2)
RBC # FLD: 13 % — SIGNIFICANT CHANGE UP (ref 10.3–14.5)
SODIUM SERPL-SCNC: 141 MMOL/L — SIGNIFICANT CHANGE UP (ref 135–145)
WBC # BLD: 5.95 K/UL — SIGNIFICANT CHANGE UP (ref 3.8–10.5)
WBC # FLD AUTO: 5.95 K/UL — SIGNIFICANT CHANGE UP (ref 3.8–10.5)

## 2018-12-02 PROCEDURE — 99285 EMERGENCY DEPT VISIT HI MDM: CPT

## 2018-12-02 RX ORDER — RASAGILINE 0.5 MG/1
1 TABLET ORAL DAILY
Qty: 0 | Refills: 0 | Status: DISCONTINUED | OUTPATIENT
Start: 2018-12-02 | End: 2018-12-04

## 2018-12-02 RX ORDER — DULOXETINE HYDROCHLORIDE 30 MG/1
30 CAPSULE, DELAYED RELEASE ORAL DAILY
Qty: 0 | Refills: 0 | Status: DISCONTINUED | OUTPATIENT
Start: 2018-12-02 | End: 2018-12-04

## 2018-12-02 RX ORDER — ACETAMINOPHEN 500 MG
650 TABLET ORAL EVERY 6 HOURS
Qty: 0 | Refills: 0 | Status: DISCONTINUED | OUTPATIENT
Start: 2018-12-02 | End: 2018-12-04

## 2018-12-02 RX ORDER — QUETIAPINE FUMARATE 200 MG/1
25 TABLET, FILM COATED ORAL AT BEDTIME
Qty: 0 | Refills: 0 | Status: DISCONTINUED | OUTPATIENT
Start: 2018-12-02 | End: 2018-12-04

## 2018-12-02 RX ORDER — HEPARIN SODIUM 5000 [USP'U]/ML
5000 INJECTION INTRAVENOUS; SUBCUTANEOUS EVERY 8 HOURS
Qty: 0 | Refills: 0 | Status: DISCONTINUED | OUTPATIENT
Start: 2018-12-02 | End: 2018-12-04

## 2018-12-02 RX ORDER — CLONAZEPAM 1 MG
0.5 TABLET ORAL
Qty: 0 | Refills: 0 | COMMUNITY

## 2018-12-02 RX ORDER — SENNA PLUS 8.6 MG/1
2 TABLET ORAL AT BEDTIME
Qty: 0 | Refills: 0 | Status: DISCONTINUED | OUTPATIENT
Start: 2018-12-02 | End: 2018-12-04

## 2018-12-02 RX ORDER — GABAPENTIN 400 MG/1
600 CAPSULE ORAL THREE TIMES A DAY
Qty: 0 | Refills: 0 | Status: DISCONTINUED | OUTPATIENT
Start: 2018-12-02 | End: 2018-12-04

## 2018-12-02 RX ORDER — SODIUM CHLORIDE 9 MG/ML
1000 INJECTION INTRAMUSCULAR; INTRAVENOUS; SUBCUTANEOUS
Qty: 0 | Refills: 0 | Status: DISCONTINUED | OUTPATIENT
Start: 2018-12-02 | End: 2018-12-03

## 2018-12-02 RX ORDER — CARBIDOPA AND LEVODOPA 25; 100 MG/1; MG/1
2 TABLET ORAL AT BEDTIME
Qty: 0 | Refills: 0 | Status: DISCONTINUED | OUTPATIENT
Start: 2018-12-02 | End: 2018-12-04

## 2018-12-02 RX ORDER — DOCUSATE SODIUM 100 MG
100 CAPSULE ORAL THREE TIMES A DAY
Qty: 0 | Refills: 0 | Status: DISCONTINUED | OUTPATIENT
Start: 2018-12-02 | End: 2018-12-04

## 2018-12-02 RX ORDER — VENLAFAXINE HCL 75 MG
37.5 CAPSULE, EXT RELEASE 24 HR ORAL
Qty: 0 | Refills: 0 | Status: DISCONTINUED | OUTPATIENT
Start: 2018-12-02 | End: 2018-12-04

## 2018-12-02 RX ORDER — CARBIDOPA AND LEVODOPA 25; 100 MG/1; MG/1
2 TABLET ORAL DAILY
Qty: 0 | Refills: 0 | Status: DISCONTINUED | OUTPATIENT
Start: 2018-12-02 | End: 2018-12-04

## 2018-12-02 RX ORDER — CHOLECALCIFEROL (VITAMIN D3) 125 MCG
2 CAPSULE ORAL
Qty: 0 | Refills: 0 | COMMUNITY

## 2018-12-02 RX ADMIN — Medication 100 MILLIGRAM(S): at 21:23

## 2018-12-02 RX ADMIN — GABAPENTIN 600 MILLIGRAM(S): 400 CAPSULE ORAL at 21:23

## 2018-12-02 RX ADMIN — CARBIDOPA AND LEVODOPA 2 TABLET(S): 25; 100 TABLET ORAL at 21:23

## 2018-12-02 RX ADMIN — SENNA PLUS 2 TABLET(S): 8.6 TABLET ORAL at 21:23

## 2018-12-02 RX ADMIN — QUETIAPINE FUMARATE 25 MILLIGRAM(S): 200 TABLET, FILM COATED ORAL at 21:23

## 2018-12-02 NOTE — H&P ADULT - MUSCULOSKELETAL
negative No joint pain, swelling or deformity; no limitation of movement details… detailed exam no joint erythema/no calf tenderness/ROM intact

## 2018-12-02 NOTE — H&P ADULT - NSHPSOCIALHISTORY_GEN_ALL_CORE
Pt discharged from [facility] living today,   Could not obtain further social h/o 2/2 to pt's confusion.

## 2018-12-02 NOTE — ED PROVIDER NOTE - OBJECTIVE STATEMENT
79 y/o F with a PMHx of Dementia, Parkinson's, Neuropathy, and Scoliosis presenting to the ED for medical eval. Family notes pt was staying at [facility] since 05/2018 and was removed from the facility today. Family believed pt was not receiving appropriate care and brought pt into ED for help with medication administration. No medical complaints at this time. Family states that pt is going to be staying at [facility] but the facility needs medical eval paperwork filled out and sent to them 24 hours in advance before pt comes to the facility. Family would like pt to stay overnight in the hospital for help with medication administration and care giving until pt can go to [facility]. 77 y/o F with a PMHx of Parkinson's w/ Dementia, Neuropathy, and Scoliosis presenting to the ED for medical eval. Family notes pt was staying at [facility] since 05/2018 and was removed from the facility today. Family believed pt was not receiving appropriate care and brought pt into ED for help with medication administration. No medical complaints at this time. Family states that pt is going to be staying at [facility] but the facility needs medical eval paperwork filled out and sent to them 24 hours in advance before pt comes to the facility. Family would like pt to stay overnight in the hospital for help with medication administration and care giving until pt can go to [facility].

## 2018-12-02 NOTE — ED PROVIDER NOTE - ATTENDING CONTRIBUTION TO CARE
AJM: Patient seen with PA and agree with above note. 77 y/o F with a PMHx of Parkinson's w/ Dementia, Neuropathy, and Scoliosis presenting to the ED for medical eval. Family notes pt was staying at [facility] since 05/2018 and was removed from the facility today. Family believed pt was not receiving appropriate care and brought pt into ED for help with medication administration. No medical complaints at this time. Family states that pt is going to be staying at [facility] but the facility needs medical eval paperwork filled out and sent to them 24 hours in advance before pt comes to the facility. Family would like pt to stay overnight in the hospital for help with medication administration and care giving until pt can go to [facility]. admit for social work and placement as pt family cannot care for her at home

## 2018-12-02 NOTE — ED ADULT NURSE NOTE - NSIMPLEMENTINTERV_GEN_ALL_ED
Implemented All Universal Safety Interventions:  Admire to call system. Call bell, personal items and telephone within reach. Instruct patient to call for assistance. Room bathroom lighting operational. Non-slip footwear when patient is off stretcher. Physically safe environment: no spills, clutter or unnecessary equipment. Stretcher in lowest position, wheels locked, appropriate side rails in place.
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
## 2018-12-02 NOTE — ED STATDOCS - PROGRESS NOTE DETAILS
Norma Harvey for Dr. Hahn: 77 y/o female with a PMHx of neuropathy, Parkinson's, scoliosis, hip repair presents to the ED c/o medical eval. Family notes pt was staying at [facility] and was recently removed from the facility. Family believed pt wasn't receiving appropriate care and now comes to ED for help with medication administration. No medical complaints at this time. Will send pt to main ED for further evaluation for social work. Norma Harvey for Dr. Hahn: 77 y/o female with a PMHx of neuropathy, Parkinson's, scoliosis, hip repair presents to the ED c/o medical eval. Family notes pt was staying at [facility] and was recently removed from the facility. Family believed pt wasn't receiving appropriate care and now comes to ED for help with medication administration. No medical complaints at this time. family states there is no where to put patient and they cannot keep her at their house. will need to see  for placement. Will send pt to main ED for further evaluation for social work.

## 2018-12-02 NOTE — ED PROVIDER NOTE - PROGRESS NOTE DETAILS
Patient seen and evaluated, ED attending note and orders reviewed, will continue with patient follow up and care -Lalo Escalante PA-C Norma CARTWRIGHT for ED attending, Dr. Richardson: Spoke with Dr. Goodman, hospitalist. Pt has a bed at [facility] but they will not take her until 234 hours after paperwork is filled out. Given this timeframe, pt will need admission. Will get admission labs and admit.

## 2018-12-02 NOTE — ED ADULT TRIAGE NOTE - CHIEF COMPLAINT QUOTE
Pt daughter requesting medical evaluation for pt and help with medications. Daughter states her mother was just removed from an assisted living facility (at the family's request). Reports pt was not receiving appropriate care. Family was not informed of what medications pt is taking. No health complaints at this time. Pt with Hx of Parkinsons and dementia.

## 2018-12-02 NOTE — H&P ADULT - ASSESSMENT
79 yo F with PMH significant for HTN, Parkinson's disease, Dementia, Scoliosis, Neuropathy was at [facility] assisted living since 05/2018, discharged today and family brought pt in for inpt medicine administration and placement.    # Dehydration  # Placement issue  - IVF NS gentle hydration  - BMP in AM  - SW consult for placement    # Hx of Parkinson's disease  - c/w Home meds    # Neuropathy  - c/w Neurontin    # Constipation  - Colace/Senna  - Miralax prn    # DVT Ppx  - HSQ    IMPROVE VTE Individual Risk Assessment    RISK                                                                Points    [  ] Previous VTE                                                  3    [  ] Thrombophilia                                               2    [  ] Lower limb paralysis                                      2        (unable to hold up >15 seconds)      [  ] Current Cancer                                              2         (within 6 months)    [  x] Immobilization > 24 hrs                                1    [  ] ICU/CCU stay > 24 hours                              1    [ x ] Age > 60                                                      1    IMPROVE VTE Score _2________    Case d/w         - 79 yo F with PMH significant for HTN, Parkinson's disease, Dementia, Scoliosis, Neuropathy, Anxiety, Depression was at [facility] since 05/2018, discharged today and family brought pt in for inpt medicine administration and placement.    # Dehydration  # Placement issue  - IVF NS gentle hydration  - BMP in AM  - SW consult for placement    # Hx of Parkinson's disease  - c/w Home meds    # Neuropathy  - c/w Neurontin    # Constipation  - Colace/Senna  - Miralax prn    # Anxiety/Depression  - c/w Seroquel, Venlafaxine PO    # DVT Ppx  - HSQ    IMPROVE VTE Individual Risk Assessment    RISK                                                                Points    [  ] Previous VTE                                                  3    [  ] Thrombophilia                                               2    [  ] Lower limb paralysis                                      2        (unable to hold up >15 seconds)      [  ] Current Cancer                                              2         (within 6 months)    [  x] Immobilization > 24 hrs                                1    [  ] ICU/CCU stay > 24 hours                              1    [ x ] Age > 60                                                      1    IMPROVE VTE Score _2________    Case d/w         - 77 yo F with PMH significant for HTN, Parkinson's disease, Dementia, Scoliosis, Neuropathy, Anxiety, Depression was at [facility] since 05/2018, discharged today and family brought pt in for inpt medicine administration and placement.    # Dehydration  - IVF NS gentle hydration  - BMP in AM    # Family concerned about Pt's care -not happy with previous facility care, wants new place for placement and medicine Mx meanwhile  - SW consult for placement    # Hx of Parkinson's disease  - c/w Home meds - Verify  pt's Parkinson's meds with pharmacy in AM  - Currently will c/w Parkinson's meds dose from 04/2018 med-recs - change dose in AM after verification     # Neuropathy  - c/w Neurontin    # Constipation  - Colace/Senna  - Miralax prn    # Anxiety/Depression  - c/w Seroquel, Venlafaxine PO    # DVT Ppx  - HSQ    IMPROVE VTE Individual Risk Assessment    RISK                                                                Points    [  ] Previous VTE                                                  3    [  ] Thrombophilia                                               2    [  ] Lower limb paralysis                                      2        (unable to hold up >15 seconds)      [  ] Current Cancer                                              2         (within 6 months)    [  x] Immobilization > 24 hrs                                1    [  ] ICU/CCU stay > 24 hours                              1    [ x ] Age > 60                                                      1    IMPROVE VTE Score _2________    Case d/w Dr. Quick        - 79 yo F with PMH significant for HTN, Parkinson's disease, Dementia, Scoliosis, Neuropathy, Anxiety, Depression was at [facility] living since 05/2018, BIB family for inpt medicine administration, dehydration and placement.    # Dehydration  - IVF NS gentle hydration  - BMP in AM    # Family concerned about Pt's care -not happy with previous facility care, wants new place for placement and medicine Mx meanwhile  - SW consult for placement    # Hx of Parkinson's disease  - c/w Home meds - Verify  pt's Parkinson's meds with pharmacy in AM  - Currently will c/w Parkinson's meds dose from 04/2018 med-recs - change dose in AM after verification     # Peripheral Neuropathy  - c/w Neurontin    # Constipation  - Colace/Senna  - Miralax prn    # Anxiety/Depression  - c/w Seroquel, Venlafaxine PO    # DVT Ppx  - HSQ    IMPROVE VTE Individual Risk Assessment    RISK                                                                Points    [  ] Previous VTE                                                  3    [  ] Thrombophilia                                               2    [  ] Lower limb paralysis                                      2        (unable to hold up >15 seconds)      [  ] Current Cancer                                              2         (within 6 months)    [  x] Immobilization > 24 hrs                                1    [  ] ICU/CCU stay > 24 hours                              1    [ x ] Age > 60                                                      1    IMPROVE VTE Score _2________    Case d/w Dr. Quick        - 77 yo F with PMH significant for HTN, Parkinson's disease, Dementia, Scoliosis, Neuropathy, Anxiety, Depression was at [facility] living since 05/2018, BIB family for inpt medicine administration, dehydration and placement.    # Dehydration  - IVF NS gentle hydration  - BMP in AM    # Living accommodations issues  - Family concerned about Pt's care -not happy with previous facility care, wants new place for placement and medicine Mx meanwhile  - SW consult for placement    # Parkinson's disease  - c/w Home meds - Verify  pt's Parkinson's meds with pharmacy in AM  - Currently will c/w Parkinson's meds dose from 04/2018 med-recs - change dose in AM after verification     # Peripheral Neuropathy  - c/w Neurontin    # Constipation  - Colace/Senna  - Miralax prn    # Anxiety/Depression  - c/w Seroquel, Venlafaxine PO    # DVT Ppx  - HSQ    IMPROVE VTE Individual Risk Assessment    RISK                                                                Points    [  ] Previous VTE                                                  3    [  ] Thrombophilia                                               2    [  ] Lower limb paralysis                                      2        (unable to hold up >15 seconds)      [  ] Current Cancer                                              2         (within 6 months)    [  x] Immobilization > 24 hrs                                1    [  ] ICU/CCU stay > 24 hours                              1    [ x ] Age > 60                                                      1    IMPROVE VTE Score _2________    Case d/w Dr. Quick        -

## 2018-12-02 NOTE — ED STATDOCS - NS_ ATTENDINGSCRIBEDETAILS _ED_A_ED_FT
I, Asa Hahn MD,  performed the initial face to face bedside interview with this patient regarding history of present illness, review of symptoms and relevant past medical, social and family history.  I completed an independent physical examination.  I was the initial provider who evaluated this patient.  The history, relevant review of systems, past medical and surgical history, medical decision making, and physical examination was documented by the scribe in my presence and I attest to the accuracy of the documentation.

## 2018-12-02 NOTE — H&P ADULT - HISTORY OF PRESENT ILLNESS
79 yo F with PMH significant for HTN, Parkinson's disease, Dementia, Scoliosis, Neuropathy was at [facility] since 05/2018, discharged today and family brought pt in for inpt medicine administration and placement. As per ED report, Family reported that pt was not getting well care and now transferred to [facility] but facility requires 24 hrs notice and medical clearance from doctor so family brought pt to  ED for medicine administration and Mx until Pt is ready to go to [facility]. Pt reported that she has not been drinking much water. Denies fever, chills, N/V/D. Denies headache dizziness, chest pain, palpitation or SOB.  No family member at bedside Pt is poor historian,  confused.    In the ED: labs: BUN/Cr: 24/0.70 77 yo F with PMH significant for HTN, Parkinson's disease, Dementia, Scoliosis, Neuropathy, Anxiety, Depression was at [facility] living since 05/2018, discharged today and family brought pt in for inpt medicine administration and placement. As per ED report, Family reported that pt was not getting well care and now transferred to [facility] but facility requires 24 hrs notice and medical clearance from doctor so family brought pt to  ED for medicine administration and Mx until Pt is ready to go to [facility]. Pt reported that she has not been drinking much water. Denies fever, chills, N/V/D. Denies headache dizziness, chest pain, palpitation or SOB.  No family member at bedside Pt is poor historian,  confused.    In the ED: labs: BUN/Cr: 24/0.70 77 yo F with PMH significant for HTN, Parkinson's disease, Dementia, Scoliosis, Neuropathy, Anxiety, Depression was at [facility] since 05/2018, family brought pt in as they were concerned about her medicine administration, dehydration and need for other placement. As per ED report, Family reported that pt was not getting well care and now transferred to [facility] but facility requires 24 hrs notice and medical clearance from doctor so family brought pt to  ED for medicine administration and rehydration until Pt is ready to go to [facility].  Pt reported that she has not been drinking much water. Pt is poor historian and states she's moving because her mother likes the other place better.  Family not available. History obtained from ED report.     In the ED: labs: BUN/Cr: 24/0.70

## 2018-12-02 NOTE — H&P ADULT - NSHPPHYSICALEXAM_GEN_ALL_CORE
Vital Signs Last 24 Hrs  T(C): 36.4 (02 Dec 2018 16:01), Max: 36.4 (02 Dec 2018 16:01)  T(F): 97.6 (02 Dec 2018 16:01), Max: 97.6 (02 Dec 2018 16:01)  HR: 99 (02 Dec 2018 16:01) (99 - 99)  BP: 186/97 (02 Dec 2018 16:01) (186/97 - 186/97)  RR: 17 (02 Dec 2018 16:01) (17 - 17)  SpO2: 100% (02 Dec 2018 16:01) (100% - 100%)

## 2018-12-02 NOTE — ED ADULT NURSE NOTE - CHPI ED NUR SYMPTOMS NEG
no vomiting/no fever/no chills/no decreased eating/drinking/no dizziness/no pain/no tingling/no nausea

## 2018-12-03 LAB
ANION GAP SERPL CALC-SCNC: 7 MMOL/L — SIGNIFICANT CHANGE UP (ref 5–17)
BUN SERPL-MCNC: 16 MG/DL — SIGNIFICANT CHANGE UP (ref 7–23)
CALCIUM SERPL-MCNC: 8.5 MG/DL — SIGNIFICANT CHANGE UP (ref 8.5–10.1)
CHLORIDE SERPL-SCNC: 109 MMOL/L — HIGH (ref 96–108)
CO2 SERPL-SCNC: 28 MMOL/L — SIGNIFICANT CHANGE UP (ref 22–31)
CREAT SERPL-MCNC: 0.56 MG/DL — SIGNIFICANT CHANGE UP (ref 0.5–1.3)
GLUCOSE SERPL-MCNC: 87 MG/DL — SIGNIFICANT CHANGE UP (ref 70–99)
POTASSIUM SERPL-MCNC: 3.4 MMOL/L — LOW (ref 3.5–5.3)
POTASSIUM SERPL-SCNC: 3.4 MMOL/L — LOW (ref 3.5–5.3)
SODIUM SERPL-SCNC: 144 MMOL/L — SIGNIFICANT CHANGE UP (ref 135–145)

## 2018-12-03 PROCEDURE — 93010 ELECTROCARDIOGRAM REPORT: CPT

## 2018-12-03 RX ORDER — POTASSIUM CHLORIDE 20 MEQ
20 PACKET (EA) ORAL
Qty: 0 | Refills: 0 | Status: COMPLETED | OUTPATIENT
Start: 2018-12-03 | End: 2018-12-03

## 2018-12-03 RX ADMIN — HEPARIN SODIUM 5000 UNIT(S): 5000 INJECTION INTRAVENOUS; SUBCUTANEOUS at 06:11

## 2018-12-03 RX ADMIN — CARBIDOPA AND LEVODOPA 2 TABLET(S): 25; 100 TABLET ORAL at 20:16

## 2018-12-03 RX ADMIN — CARBIDOPA AND LEVODOPA 2 TABLET(S): 25; 100 TABLET ORAL at 11:42

## 2018-12-03 RX ADMIN — GABAPENTIN 600 MILLIGRAM(S): 400 CAPSULE ORAL at 20:15

## 2018-12-03 RX ADMIN — GABAPENTIN 600 MILLIGRAM(S): 400 CAPSULE ORAL at 06:11

## 2018-12-03 RX ADMIN — Medication 650 MILLIGRAM(S): at 12:39

## 2018-12-03 RX ADMIN — Medication 100 MILLIGRAM(S): at 20:16

## 2018-12-03 RX ADMIN — DULOXETINE HYDROCHLORIDE 30 MILLIGRAM(S): 30 CAPSULE, DELAYED RELEASE ORAL at 11:42

## 2018-12-03 RX ADMIN — Medication 20 MILLIEQUIVALENT(S): at 20:16

## 2018-12-03 RX ADMIN — GABAPENTIN 600 MILLIGRAM(S): 400 CAPSULE ORAL at 14:57

## 2018-12-03 RX ADMIN — HEPARIN SODIUM 5000 UNIT(S): 5000 INJECTION INTRAVENOUS; SUBCUTANEOUS at 20:15

## 2018-12-03 RX ADMIN — Medication 37.5 MILLIGRAM(S): at 17:31

## 2018-12-03 RX ADMIN — Medication 37.5 MILLIGRAM(S): at 10:04

## 2018-12-03 RX ADMIN — Medication 650 MILLIGRAM(S): at 11:55

## 2018-12-03 RX ADMIN — Medication 1 TABLET(S): at 11:42

## 2018-12-03 RX ADMIN — HEPARIN SODIUM 5000 UNIT(S): 5000 INJECTION INTRAVENOUS; SUBCUTANEOUS at 14:57

## 2018-12-03 RX ADMIN — Medication 20 MILLIEQUIVALENT(S): at 17:31

## 2018-12-03 RX ADMIN — QUETIAPINE FUMARATE 25 MILLIGRAM(S): 200 TABLET, FILM COATED ORAL at 20:16

## 2018-12-03 RX ADMIN — RASAGILINE 1 MILLIGRAM(S): 0.5 TABLET ORAL at 11:42

## 2018-12-03 RX ADMIN — Medication 1 TABLET(S): at 14:57

## 2018-12-03 NOTE — PROGRESS NOTE ADULT - SUBJECTIVE AND OBJECTIVE BOX
CC- needs placement     HPI:  77 yo F with PMH significant for HTN, Parkinson's disease, Dementia, Scoliosis, Neuropathy, Anxiety, Depression was at [facility] living since 05/2018, family brought pt in as they were concerned about her medicine administration, dehydration and need for other placement. As per ED report, Family reported that pt was not getting well care and now transferred to [facility] but facility requires 24 hrs notice and medical clearance from doctor so family brought pt to  ED for medicine administration and rehydration until Pt is ready to go to [facility].  Pt reported that she has not been drinking much water. Pt is poor historian and states she's moving because her mother likes the other place better.  Family not available. History obtained from ED report. In the ED: labs: BUN/Cr: 24/0.70 (02 Dec 2018 19:47)    12/3/18- no acute events, confused  Review of system- All 10 systems reviewed and is as per HPI otherwise negative.     T(C): 36.6 (12-03-18 @ 11:26), Max: 36.8 (12-02-18 @ 22:01)  HR: 93 (12-03-18 @ 11:26) (76 - 96)  BP: 123/48 (12-03-18 @ 11:26) (123/48 - 148/70)  RR: 16 (12-03-18 @ 11:26) (16 - 16)  SpO2: 95% (12-03-18 @ 11:26) (95% - 100%)  Wt(kg): --    LABS:                        12.5   5.95  )-----------( 295      ( 02 Dec 2018 17:50 )             38.3     12-03    144  |  109<H>  |  16  ----------------------------<  87  3.4<L>   |  28  |  0.56    Ca    8.5      03 Dec 2018 06:49    RADIOLOGY & ADDITIONAL TESTS:      PHYSICAL EXAM:  GENERAL: NAD, well-groomed, well-developed  HEAD:  Atraumatic, Normocephalic  EYES: EOMI, PERRLA, conjunctiva and sclera clear  HEENT: Moist mucous membranes  NECK: Supple, No JVD  NERVOUS SYSTEM:  Alert & Oriented X3, Motor Strength 5/5 B/L upper and lower extremities; DTRs 2+ intact and symmetric  CHEST/LUNG: Clear to auscultation bilaterally; No rales, rhonchi, wheezing, or rubs  HEART: Regular rate and rhythm; No murmurs, rubs, or gallops  ABDOMEN: Soft, Nontender, Nondistended; Bowel sounds present  GENITOURINARY- Voiding, no palpable bladder  EXTREMITIES:  2+ Peripheral Pulses, No clubbing, cyanosis, or edema  MUSCULOSKELTAL- No muscle tenderness, Muscle tone normal, No joint tenderness, no Joint swelling, Joint range of motion-normal  SKIN-no rash, no lesion  CNS- alert, oriented X3, non focal     MEDICATIONS  (STANDING):  calcium carbonate 1250 mG  + Vitamin D (OsCal 500 + D) 1 Tablet(s) Oral three times a day  carbidopa/levodopa  25/100 2 Tablet(s) Oral daily  carbidopa/levodopa  25/100 2 Tablet(s) Oral at bedtime  docusate sodium 100 milliGRAM(s) Oral three times a day  DULoxetine 30 milliGRAM(s) Oral daily  gabapentin 600 milliGRAM(s) Oral three times a day  heparin  Injectable 5000 Unit(s) SubCutaneous every 8 hours  multivitamin 1 Tablet(s) Oral daily  QUEtiapine 25 milliGRAM(s) Oral at bedtime  rasagiline Tablet 1 milliGRAM(s) Oral daily  senna 2 Tablet(s) Oral at bedtime  venlafaxine 37.5 milliGRAM(s) Oral two times a day with meals    MEDICATIONS  (PRN):  acetaminophen   Tablet .. 650 milliGRAM(s) Oral every 6 hours PRN Temp greater or equal to 38C (100.4F), Mild Pain (1 - 3)    Assessment/Plan  #Placement  PT eval- unclear baseline functional capacity    #Parkinson's/Dementia  Cont Sinemet  Confused at baseline    #Dispo- pending, probably assisted living

## 2018-12-04 ENCOUNTER — TRANSCRIPTION ENCOUNTER (OUTPATIENT)
Age: 78
End: 2018-12-04

## 2018-12-04 VITALS — WEIGHT: 119.71 LBS

## 2018-12-04 LAB
ANION GAP SERPL CALC-SCNC: 6 MMOL/L — SIGNIFICANT CHANGE UP (ref 5–17)
BUN SERPL-MCNC: 20 MG/DL — SIGNIFICANT CHANGE UP (ref 7–23)
CALCIUM SERPL-MCNC: 8.8 MG/DL — SIGNIFICANT CHANGE UP (ref 8.5–10.1)
CHLORIDE SERPL-SCNC: 108 MMOL/L — SIGNIFICANT CHANGE UP (ref 96–108)
CO2 SERPL-SCNC: 28 MMOL/L — SIGNIFICANT CHANGE UP (ref 22–31)
CREAT SERPL-MCNC: 0.61 MG/DL — SIGNIFICANT CHANGE UP (ref 0.5–1.3)
GLUCOSE SERPL-MCNC: 92 MG/DL — SIGNIFICANT CHANGE UP (ref 70–99)
HCT VFR BLD CALC: 36.8 % — SIGNIFICANT CHANGE UP (ref 34.5–45)
HGB BLD-MCNC: 12.1 G/DL — SIGNIFICANT CHANGE UP (ref 11.5–15.5)
MCHC RBC-ENTMCNC: 31.7 PG — SIGNIFICANT CHANGE UP (ref 27–34)
MCHC RBC-ENTMCNC: 32.9 GM/DL — SIGNIFICANT CHANGE UP (ref 32–36)
MCV RBC AUTO: 96.3 FL — SIGNIFICANT CHANGE UP (ref 80–100)
NRBC # BLD: 0 /100 WBCS — SIGNIFICANT CHANGE UP (ref 0–0)
PLATELET # BLD AUTO: 257 K/UL — SIGNIFICANT CHANGE UP (ref 150–400)
POTASSIUM SERPL-MCNC: 4.2 MMOL/L — SIGNIFICANT CHANGE UP (ref 3.5–5.3)
POTASSIUM SERPL-SCNC: 4.2 MMOL/L — SIGNIFICANT CHANGE UP (ref 3.5–5.3)
RBC # BLD: 3.82 M/UL — SIGNIFICANT CHANGE UP (ref 3.8–5.2)
RBC # FLD: 13.1 % — SIGNIFICANT CHANGE UP (ref 10.3–14.5)
SODIUM SERPL-SCNC: 142 MMOL/L — SIGNIFICANT CHANGE UP (ref 135–145)
WBC # BLD: 5.41 K/UL — SIGNIFICANT CHANGE UP (ref 3.8–10.5)
WBC # FLD AUTO: 5.41 K/UL — SIGNIFICANT CHANGE UP (ref 3.8–10.5)

## 2018-12-04 RX ORDER — GABAPENTIN 400 MG/1
2 CAPSULE ORAL
Qty: 180 | Refills: 0
Start: 2018-12-04 | End: 2019-01-02

## 2018-12-04 RX ORDER — FERROUS SULFATE 325(65) MG
1 TABLET ORAL
Qty: 0 | Refills: 0 | COMMUNITY

## 2018-12-04 RX ORDER — DULOXETINE HYDROCHLORIDE 30 MG/1
1 CAPSULE, DELAYED RELEASE ORAL
Qty: 30 | Refills: 0
Start: 2018-12-04 | End: 2019-01-02

## 2018-12-04 RX ORDER — DOCUSATE SODIUM 100 MG
3 CAPSULE ORAL
Qty: 90 | Refills: 0
Start: 2018-12-04 | End: 2019-01-02

## 2018-12-04 RX ORDER — CARBIDOPA AND LEVODOPA 25; 100 MG/1; MG/1
2 TABLET ORAL
Qty: 0 | Refills: 0 | COMMUNITY

## 2018-12-04 RX ORDER — GABAPENTIN 400 MG/1
1 CAPSULE ORAL
Qty: 0 | Refills: 0 | COMMUNITY

## 2018-12-04 RX ORDER — RASAGILINE 0.5 MG/1
1 TABLET ORAL
Qty: 30 | Refills: 0
Start: 2018-12-04 | End: 2019-01-02

## 2018-12-04 RX ORDER — QUETIAPINE FUMARATE 200 MG/1
1 TABLET, FILM COATED ORAL
Qty: 30 | Refills: 0
Start: 2018-12-04 | End: 2019-01-02

## 2018-12-04 RX ORDER — RANITIDINE HYDROCHLORIDE 150 MG/1
10 TABLET, FILM COATED ORAL
Qty: 0 | Refills: 0 | COMMUNITY

## 2018-12-04 RX ORDER — QUETIAPINE FUMARATE 200 MG/1
25 TABLET, FILM COATED ORAL
Qty: 0 | Refills: 0 | COMMUNITY

## 2018-12-04 RX ORDER — RASAGILINE 0.5 MG/1
1 TABLET ORAL
Qty: 0 | Refills: 0 | COMMUNITY

## 2018-12-04 RX ORDER — VENLAFAXINE HCL 75 MG
1 CAPSULE, EXT RELEASE 24 HR ORAL
Qty: 60 | Refills: 0
Start: 2018-12-04 | End: 2019-01-02

## 2018-12-04 RX ORDER — CARBIDOPA AND LEVODOPA 25; 100 MG/1; MG/1
2 TABLET ORAL
Qty: 120 | Refills: 0
Start: 2018-12-04 | End: 2019-01-02

## 2018-12-04 RX ORDER — VENLAFAXINE HCL 75 MG
1 CAPSULE, EXT RELEASE 24 HR ORAL
Qty: 0 | Refills: 0 | COMMUNITY

## 2018-12-04 RX ADMIN — DULOXETINE HYDROCHLORIDE 30 MILLIGRAM(S): 30 CAPSULE, DELAYED RELEASE ORAL at 13:36

## 2018-12-04 RX ADMIN — HEPARIN SODIUM 5000 UNIT(S): 5000 INJECTION INTRAVENOUS; SUBCUTANEOUS at 06:18

## 2018-12-04 RX ADMIN — GABAPENTIN 600 MILLIGRAM(S): 400 CAPSULE ORAL at 13:41

## 2018-12-04 NOTE — DIETITIAN INITIAL EVALUATION ADULT. - PHYSICAL APPEARANCE
other (specify)/underweight/NFPE significant for severe clavicle/hand; mild temporal muscle wasting.  severe tricep and moderate orbital fat wasting

## 2018-12-04 NOTE — DISCHARGE NOTE ADULT - MEDICATION SUMMARY - MEDICATIONS TO STOP TAKING
I will STOP taking the medications listed below when I get home from the hospital:    senna oral tablet  -- 2 tab(s) by mouth once a day (at bedtime)

## 2018-12-04 NOTE — DIETITIAN INITIAL EVALUATION ADULT. - PERTINENT MEDS FT
MEDICATIONS  (STANDING):  calcium carbonate 1250 mG  + Vitamin D (OsCal 500 + D) 1 Tablet(s) Oral three times a day  carbidopa/levodopa  25/100 2 Tablet(s) Oral daily  carbidopa/levodopa  25/100 2 Tablet(s) Oral at bedtime  docusate sodium 100 milliGRAM(s) Oral three times a day  DULoxetine 30 milliGRAM(s) Oral daily  gabapentin 600 milliGRAM(s) Oral three times a day  heparin  Injectable 5000 Unit(s) SubCutaneous every 8 hours  multivitamin 1 Tablet(s) Oral daily  QUEtiapine 25 milliGRAM(s) Oral at bedtime  rasagiline Tablet 1 milliGRAM(s) Oral daily  senna 2 Tablet(s) Oral at bedtime  venlafaxine 37.5 milliGRAM(s) Oral two times a day with meals    MEDICATIONS  (PRN):  acetaminophen   Tablet .. 650 milliGRAM(s) Oral every 6 hours PRN Temp greater or equal to 38C (100.4F), Mild Pain (1 - 3)

## 2018-12-04 NOTE — DIETITIAN INITIAL EVALUATION ADULT. - PERTINENT LABORATORY DATA
12-04 Na142 mmol/L Glu 92 mg/dL K+ 4.2 mmol/L Cr  0.61 mg/dL BUN 20 mg/dL Phos n/a   Alb n/a   PAB n/a

## 2018-12-04 NOTE — DISCHARGE NOTE ADULT - MEDICATION SUMMARY - MEDICATIONS TO TAKE
I will START or STAY ON the medications listed below when I get home from the hospital:    gabapentin 300 mg oral capsule  -- 2 cap(s) by mouth 3 times a day  -- Indication: For neuropathy    DULoxetine 30 mg oral delayed release capsule  -- 1 cap(s) by mouth once a day  -- Indication: For depression    venlafaxine 37.5 mg oral tablet  -- 1 tab(s) by mouth 2 times a day (with meals)  -- Indication: For depression    carbidopa-levodopa 25 mg-100 mg oral tablet  -- 2 tab(s) by mouth 2 times a day   -- Indication: For Parkinson's disease    rasagiline 1 mg oral tablet  -- 1 tab(s) by mouth once a day  -- Indication: For Parkinson's disease    QUEtiapine 25 mg oral tablet  -- 1 tab(s) by mouth once a day (at bedtime)  -- Indication: For dementia    docusate sodium 100 mg oral capsule  -- 3 cap(s) by mouth once a day (at bedtime)  -- Indication: For bowel regimen

## 2018-12-04 NOTE — DISCHARGE NOTE ADULT - CARE PLAN
Principal Discharge DX:	Parkinson's disease  Goal:	assisted living  Assessment and plan of treatment:	Assisted living

## 2018-12-04 NOTE — PHYSICAL THERAPY INITIAL EVALUATION ADULT - ADDITIONAL COMMENTS
pt reports non-amb at [facility]. pt p/w +LLD (L<R by "3 1/2 inches" per pt)-pt unclear as to whether she has shoe lift

## 2018-12-04 NOTE — PHYSICAL THERAPY INITIAL EVALUATION ADULT - PERTINENT HX OF CURRENT PROBLEM, REHAB EVAL
accommodation issue, family wants to transfer pt from [facility] to [facility]-facility requires 24 hr. notice, medical clearance

## 2018-12-04 NOTE — CHART NOTE - NSCHARTNOTEFT_GEN_A_CORE
Upon Nutritional Assessment by the Registered Dietitian your patient was determined to meet criteria / has evidence of the following diagnosis/diagnoses:          [ ]  Mild Protein Calorie Malnutrition        [ ]  Moderate Protein Calorie Malnutrition        [x] Severe Protein Calorie Malnutrition        [ ] Unspecified Protein Calorie Malnutrition        [ ] Underweight / BMI <19        [ ] Morbid Obesity / BMI > 40      Findings:  Upon visit, pt appears underwt with BMI of 19. NFPE significant for severe clavicle/hand; mild temporal muscle wasting.  severe tricep and moderate orbital fat wasting.  No family at bedside to provide information; unclear if pt has adequate PO intake.  EMR shows wt loss of 12# (10%) in past 8 months; not clinically significant.  no edema noted.  BM (+) 12/3.  jose manuel score of 14, no PU noted.  Based on above, pt meets criteria for severe malnutrition in chronic illness.     Findings as based on:  •  Comprehensive nutrition assessment and consultation  •  Calorie counts (nutrient intake analysis)  •  Food acceptance and intake status from observations by staff  •  Follow up  •  Patient education  •  Intervention secondary to interdisciplinary rounds  •   concerns      Treatment:    The following diet has been recommended:  1) add ensure enlive TID   2) encourage oral supplement between meals to optimize PO intake   3) biweekly wt checks   4) c/w MVI with minerals daily to ensure 100% RDI met    PROVIDER Section:     By signing this assessment you are acknowledging and agree with the diagnosis/diagnoses assigned by the Registered Dietitian    Comments:

## 2018-12-04 NOTE — DISCHARGE NOTE ADULT - HOSPITAL COURSE
CC- placement  HPI:  77 yo F with PMH significant for HTN, Parkinson's disease, Dementia, Scoliosis, Neuropathy, Anxiety, Depression was at [facility] since 05/2018, family brought pt in as they were concerned about her medicine administration, dehydration and need for other placement. As per ED report, Family reported that pt was not getting well care and now transferred to [facility] but facility requires 24 hrs notice and medical clearance from doctor so family brought pt to  ED for medicine administration and rehydration until Pt is ready to go to [facility].  Pt reported that she has not been drinking much water. Pt is poor historian and states she's moving because her mother likes the other place better.  Family not available. History obtained from ED report.   Hospital stay- pt is at her baseline  Review of system- All 10 systems reviewed and is as per HPI otherwise negative.   T(C): 36.4 (12-04-18 @ 11:23), Max: 36.9 (12-03-18 @ 17:04)  HR: 92 (12-04-18 @ 11:23) (82 - 98)  BP: 130/66 (12-04-18 @ 11:23) (107/52 - 154/76)  RR: 18 (12-04-18 @ 11:23) (16 - 18)  SpO2: 99% (12-04-18 @ 11:23) (98% - 99%)  Wt(kg): --  LABS:                        12.1   5.41  )-----------( 257      ( 04 Dec 2018 06:43 )             36.8     12-04    142  |  108  |  20  ----------------------------<  92  4.2   |  28  |  0.61  Ca    8.8      04 Dec 2018 06:43    Assessment/Plan  #Dementia/Depression/Parkinson's  Cont home meds  #Dispo- assisted living today

## 2018-12-04 NOTE — DIETITIAN INITIAL EVALUATION ADULT. - OTHER INFO
pt seen 2/2 low BMI; no consult.  77yo female with PMH of HTN, parkinson's, dementia, scoliosis, neuropathy, anxiety, depression living at [facility] facility.  Pt admitted for hydration prior to long term placement.  Upon visit, pt appears underwt with BMI of 19. NFPE significant for severe clavicle/hand; mild temporal muscle wasting.  severe tricep and moderate orbital fat wasting.  No family at bedside to provide information; unclear if pt has adequate PO intake.  EMR shows wt loss of 12# (10%) in past 8 months; not clinically significant.  no edema noted.  BM (+) 12/3.  jose manuel score of 14, no PU noted.  Based on above, pt meets criteria for severe malnutrition in chronic illness.  labs WNL.  RECOMMENDATIONS: 1) add ensure enlive TID 2) encourage oral supplement between meals to optimize PO intake 3) biweekly wt checks 4) c/w MVI with minerals daily to ensure 100% RDI met

## 2018-12-04 NOTE — PHYSICAL THERAPY INITIAL EVALUATION ADULT - FOLLOWS COMMANDS/ANSWERS QUESTIONS, REHAB EVAL
EXAM DATE/TIME:  09/16/2017 16:25 

 

[facility] COMPARISON:     

CHEST SINGLE AP, September 02, 2017, 9:46.

 

                     

INDICATIONS :     

Cough. Upper abdominal pain.

                     

 

MEDICAL HISTORY :     

None.          

 

SURGICAL HISTORY :     

None.   

 

ENCOUNTER:     

Initial                                        

 

ACUITY:     

1 day      

 

PAIN SCORE:     

7/10

 

LOCATION:     

Bilateral chest 

 

FINDINGS:     

A single view of the chest demonstrates the lungs to be symmetrically aerated without evidence of mas
s, infiltrate or effusion.  The cardiomediastinal contours are unremarkable.  Osseous structures are 
intact.

 

CONCLUSION:     

1. No acute cardiopulmonary disease.

 

 

 

 Viktor Garcia MD on September 16, 2017 at 16:36           

Board Certified Radiologist.

 This report was verified electronically. able to follow single-step instructions

## 2018-12-04 NOTE — PROGRESS NOTE ADULT - SUBJECTIVE AND OBJECTIVE BOX
CC- needs placement     HPI:  77 yo F with PMH significant for HTN, Parkinson's disease, Dementia, Scoliosis, Neuropathy, Anxiety, Depression was at [facility] since 05/2018, family brought pt in as they were concerned about her medicine administration, dehydration and need for other placement. As per ED report, Family reported that pt was not getting well care and now transferred to [facility] but facility requires 24 hrs notice and medical clearance from doctor so family brought pt to  ED for medicine administration and rehydration until Pt is ready to go to [facility].  Pt reported that she has not been drinking much water. Pt is poor historian and states she's moving because her mother likes the other place better.  Family not available. History obtained from ED report. In the ED: labs: BUN/Cr: 24/0.70 (02 Dec 2018 19:47)    12/3/18- no acute events, confused  12/4/18 no acute events  Review of system- All 10 systems reviewed and is as per HPI otherwise negative.     Vital Signs Last 24 Hrs  T(C): 36.4 (04 Dec 2018 11:23), Max: 36.9 (03 Dec 2018 17:04)  T(F): 97.6 (04 Dec 2018 11:23), Max: 98.5 (03 Dec 2018 17:04)  HR: 92 (04 Dec 2018 11:23) (82 - 98)  BP: 130/66 (04 Dec 2018 11:23) (107/52 - 154/76)  BP(mean): 73 (04 Dec 2018 11:23) (73 - 73)  RR: 18 (04 Dec 2018 11:23) (16 - 18)  SpO2: 99% (04 Dec 2018 11:23) (98% - 99%)    LABS:                        12.1   5.41  )-----------( 257      ( 04 Dec 2018 06:43 )             36.8     04 Dec 2018 06:43    142    |  108    |  20     ----------------------------<  92     4.2     |  28     |  0.61     Ca    8.8        04 Dec 2018 06:43    RADIOLOGY & ADDITIONAL TESTS:      PHYSICAL EXAM:  GENERAL: NAD, well-groomed, well-developed  HEAD:  Atraumatic, Normocephalic  EYES: EOMI, PERRLA, conjunctiva and sclera clear  HEENT: Moist mucous membranes  NECK: Supple, No JVD  NERVOUS SYSTEM:  Awake, confused, non-focal  CHEST/LUNG: Clear to auscultation bilaterally; No rales, rhonchi, wheezing, or rubs  HEART: Regular rate and rhythm; No murmurs, rubs, or gallops  ABDOMEN: Soft, Nontender, Nondistended; Bowel sounds present  GENITOURINARY- Voiding, no palpable bladder  EXTREMITIES:  2+ Peripheral Pulses, No clubbing, cyanosis, or edema  MUSCULOSKELTAL- No muscle tenderness, Muscle tone normal, No joint tenderness, no Joint swelling, Joint range of motion-normal  SKIN-no rash, no lesion    MEDICATIONS  (STANDING):  calcium carbonate 1250 mG  + Vitamin D (OsCal 500 + D) 1 Tablet(s) Oral three times a day  carbidopa/levodopa  25/100 2 Tablet(s) Oral daily  carbidopa/levodopa  25/100 2 Tablet(s) Oral at bedtime  docusate sodium 100 milliGRAM(s) Oral three times a day  DULoxetine 30 milliGRAM(s) Oral daily  gabapentin 600 milliGRAM(s) Oral three times a day  heparin  Injectable 5000 Unit(s) SubCutaneous every 8 hours  multivitamin 1 Tablet(s) Oral daily  QUEtiapine 25 milliGRAM(s) Oral at bedtime  rasagiline Tablet 1 milliGRAM(s) Oral daily  senna 2 Tablet(s) Oral at bedtime  venlafaxine 37.5 milliGRAM(s) Oral two times a day with meals    MEDICATIONS  (PRN):  acetaminophen   Tablet .. 650 milliGRAM(s) Oral every 6 hours PRN Temp greater or equal to 38C (100.4F), Mild Pain (1 - 3)    Assessment/Plan  #Placement  PT eval- unclear baseline functional capacity    #Parkinson's/Dementia/Depression  Cont Sinemet  Confused at baseline  Can be f/u by psychiatry as outpatient in case needs meds adjustment    #Dispo- pending, probably assisted living

## 2018-12-04 NOTE — DISCHARGE NOTE ADULT - PATIENT PORTAL LINK FT
You can access the [facility] Patient Portal, offered by [facility], by registering with the following website: [email or web address]

## 2018-12-06 DIAGNOSIS — E43 UNSPECIFIED SEVERE PROTEIN-CALORIE MALNUTRITION: ICD-10-CM

## 2018-12-06 DIAGNOSIS — M41.9 SCOLIOSIS, UNSPECIFIED: ICD-10-CM

## 2018-12-06 DIAGNOSIS — E86.0 DEHYDRATION: ICD-10-CM

## 2018-12-06 DIAGNOSIS — F41.9 ANXIETY DISORDER, UNSPECIFIED: ICD-10-CM

## 2018-12-06 DIAGNOSIS — Z79.82 LONG TERM (CURRENT) USE OF ASPIRIN: ICD-10-CM

## 2018-12-06 DIAGNOSIS — F32.9 MAJOR DEPRESSIVE DISORDER, SINGLE EPISODE, UNSPECIFIED: ICD-10-CM

## 2018-12-06 DIAGNOSIS — Z96.649 PRESENCE OF UNSPECIFIED ARTIFICIAL HIP JOINT: ICD-10-CM

## 2018-12-06 DIAGNOSIS — G62.9 POLYNEUROPATHY, UNSPECIFIED: ICD-10-CM

## 2018-12-06 DIAGNOSIS — K59.00 CONSTIPATION, UNSPECIFIED: ICD-10-CM

## 2018-12-06 DIAGNOSIS — G20 PARKINSON'S DISEASE: ICD-10-CM

## 2018-12-06 DIAGNOSIS — I10 ESSENTIAL (PRIMARY) HYPERTENSION: ICD-10-CM

## 2018-12-06 DIAGNOSIS — F02.80 DEMENTIA IN OTHER DISEASES CLASSIFIED ELSEWHERE, UNSPECIFIED SEVERITY, WITHOUT BEHAVIORAL DISTURBANCE, PSYCHOTIC DISTURBANCE, MOOD DISTURBANCE, AND ANXIETY: ICD-10-CM

## 2019-09-08 NOTE — BRIEF OPERATIVE NOTE - ASSISTANT(S)
Green, Megas normal... Well appearing, well nourished, awake, alert, oriented to person, place, time/situation and in no apparent distress.

## 2019-09-20 NOTE — DISCHARGE NOTE ADULT - PROVIDER TOKENS
Patient discharging home with caregiver. IV and telemetry removed. Discharge instructions and medications reviewed, patient verbalizes understanding and states she has no further questions at this time. Discharging with personal belongings and AVS packet. Leaving the unit via wheelchair with nursing staff. TOKEN:'[id number]'

## 2019-12-30 NOTE — ED ADULT NURSE NOTE - NS ED NOTE ABUSE SUSPICION NEGLECT YN
"Patient resting comfortably throughout shift, VSS, afebrile, NADN.  Patient reports feeling "so much better".  " No

## 2020-01-17 NOTE — PATIENT PROFILE ADULT. - AS [facility] [employer]
"Chief Complaint   Patient presents with     Consult     pt here with daughter in law, pt concerned with getting lost like in a store etc. pt is the main  since uziel had shoulder surgery.        Initial /50 (BP Location: Right arm, Patient Position: Chair, Cuff Size: Adult Regular)  Pulse 107  Temp 98.3  F (36.8  C) (Oral)  Ht 5' 1.75\" (1.568 m)  Wt 116 lb (52.6 kg)  SpO2 97%  BMI 21.39 kg/m2 Estimated body mass index is 21.39 kg/(m^2) as calculated from the following:    Height as of this encounter: 5' 1.75\" (1.568 m).    Weight as of this encounter: 116 lb (52.6 kg).  Medication Reconciliation: complete    "
[Patient] : patient
[Medical Records] : medical records
[Father] : father
(3) slightly limited

## 2020-02-10 NOTE — DISCHARGE NOTE ADULT - LAUNCH MEDICATION RECONCILIATION
Subjective     Nikki Morales is a 59 year old female who presents to clinic today for the following health issues:    HPI   Infection in chest    How many servings of fruits and vegetables do you eat daily?  0-1    On average, how many sweetened beverages do you drink each day (Examples: soda, juice, sweet tea, etc.  Do NOT count diet or artificially sweetened beverages)?   0    How many days per week do you exercise enough to make your heart beat faster? 3 or less    How many minutes a day do you exercise enough to make your heart beat faster? 60 or more  How many days per week do you miss taking your medication? Depends on which med it is she states    What makes it hard for you to take your medications?  remembering to take    Patient has been having trouble with breathing.  Her chronic fatigue symptoms have been increased lately.  She is wondering what might be done for her.  She called the police on her adult son and has a court date coming up.  When her son is sober he is a very decent human being but alcohol is a problem for him.  She is hoping that this time somebody addresses his psychiatric needs as well as his alcohol issues.  Patient has attention deficit and depressive issues and believes this is part of her sons problems as well.  Last week she had sinus type issues and contacted me about possible treatment before today's visit.  The antibiotic seems to be helping.  Patient mostly states she just has trouble completing everything all day and she gets tired easily and takes 2 or 3 days to recover.    BP Readings from Last 3 Encounters:   02/10/20 122/70   11/25/19 132/76   08/02/19 (!) 151/74    Wt Readings from Last 3 Encounters:   02/10/20 91.3 kg (201 lb 3.2 oz)   11/25/19 91.2 kg (201 lb)   07/17/19 91.3 kg (201 lb 4.8 oz)                      Reviewed and updated as needed this visit by Provider         Review of Systems   ROS COMP: Constitutional, HEENT, cardiovascular, pulmonary, gi and  "gu systems are negative, except as otherwise noted.      Objective    /70   Pulse 84   Temp 97  F (36.1  C) (Temporal)   Resp 12   Ht 1.758 m (5' 9.21\")   Wt 91.3 kg (201 lb 3.2 oz)   SpO2 94%   BMI 29.53 kg/m    Body mass index is 29.53 kg/m .  Physical Exam   GENERAL: healthy, alert and no distress  EYES: Eyes grossly normal to inspection, PERRL and conjunctivae and sclerae normal  HENT: ear canals and TM's normal, nose and mouth without ulcers or lesions  NECK: no adenopathy, no asymmetry, masses, or scars and thyroid normal to palpation  RESP: lungs clear to auscultation - no rales, rhonchi or wheezes  CV: regular rate and rhythm, normal S1 S2, no S3 or S4, no murmur, click or rub, no peripheral edema and peripheral pulses strong  ABDOMEN: soft, nontender, no hepatosplenomegaly, no masses and bowel sounds normal  MS: Remedies grossly normal.  She is slightly tender along the sternal border especially left.  SKIN: no suspicious lesions or rashes  NEURO: Normal strength and tone, mentation intact and speech normal  PSYCH: mentation appears normal, affect flat, anxious, fatigued, judgement and insight intact and appearance well groomed  LYMPH: no cervical, supraclavicular, axillary, or inguinal adenopathy    Diagnostic Test Results:  Labs reviewed in Epic  Results for orders placed or performed in visit on 02/10/20   TSH with free T4 reflex     Status: Abnormal   Result Value Ref Range    TSH 4.20 (H) 0.40 - 4.00 mU/L   CBC with platelets     Status: Abnormal   Result Value Ref Range    WBC 12.1 (H) 4.0 - 11.0 10e9/L    RBC Count 4.65 3.8 - 5.2 10e12/L    Hemoglobin 14.1 11.7 - 15.7 g/dL    Hematocrit 42.3 35.0 - 47.0 %    MCV 91 78 - 100 fl    MCH 30.3 26.5 - 33.0 pg    MCHC 33.3 31.5 - 36.5 g/dL    RDW 12.7 10.0 - 15.0 %    Platelet Count 282 150 - 450 10e9/L   Comprehensive metabolic panel     Status: Abnormal   Result Value Ref Range    Sodium 140 133 - 144 mmol/L    Potassium 4.4 3.4 - 5.3 mmol/L "    Chloride 108 94 - 109 mmol/L    Carbon Dioxide 28 20 - 32 mmol/L    Anion Gap 4 3 - 14 mmol/L    Glucose 116 (H) 70 - 99 mg/dL    Urea Nitrogen 27 7 - 30 mg/dL    Creatinine 0.86 0.52 - 1.04 mg/dL    GFR Estimate 72 >60 mL/min/[1.73_m2]    GFR Estimate If Black 84 >60 mL/min/[1.73_m2]    Calcium 9.7 8.5 - 10.1 mg/dL    Bilirubin Total 0.4 0.2 - 1.3 mg/dL    Albumin 3.7 3.4 - 5.0 g/dL    Protein Total 7.4 6.8 - 8.8 g/dL    Alkaline Phosphatase 62 40 - 150 U/L    ALT 31 0 - 50 U/L    AST 19 0 - 45 U/L   Erythrocyte sedimentation rate auto     Status: None   Result Value Ref Range    Sed Rate 11 0 - 30 mm/h   T4 free     Status: None   Result Value Ref Range    T4 Free 0.83 0.76 - 1.46 ng/dL           Assessment & Plan     1. Chronic fatigue syndrome  Continue current management and the testing below was unremarkable.  Please see #2.  - zolpidem (AMBIEN) 5 MG tablet; 1 to 2 tabs at hour of sleep as needed  Dispense: 60 tablet; Refill: 5  - CBC with platelets  - Comprehensive metabolic panel  - Erythrocyte sedimentation rate auto    2. Hypothyroidism due to acquired atrophy of thyroid  TSH is slightly elevated and T4 just in the normal range.  I believe if we increase her thyroid a bit she will do better.  Lab will be rechecked in approximately 6 to 8 weeks.  - TSH with free T4 reflex    3. Mild persistent asthma without complication  With the mild sternal chest pain, persistent cough, recent respiratory infection, I think in a intermittent smoker over many years a chest CT would be appropriate.  We are checking on this and will schedule.  - CT Chest w Contrast; Future  - T4 free  - T4 free    4. Cough  See above  - CT Chest w Contrast; Future    5. Intermittent atrial fibrillation (H)  No symptoms of this.  She will say occasionally she will feel skipped beat but this is very rare.  I do not think she is having issues with this today    6. Mixed bipolar I disorder (H)  Present but relatively stable.  No major  "changes in medication plan.  I do not think this is accounting for her symptoms.       BMI:   Estimated body mass index is 29.53 kg/m  as calculated from the following:    Height as of this encounter: 1.758 m (5' 9.21\").    Weight as of this encounter: 91.3 kg (201 lb 3.2 oz).   Weight management plan: Discussed healthy diet and exercise guidelines        Patient Instructions   We will check labs and CT and see what else we might do.  For now finish antibiotics for current infection.      Return in about 4 weeks (around 3/9/2020) for Chronic fatigue.    Erikmyrna Peraza MD  [facility]        " <<-----Click here for Discharge Medication Review

## 2020-04-10 ENCOUNTER — INPATIENT (INPATIENT)
Facility: HOSPITAL | Age: 80
LOS: 6 days | DRG: 871 | End: 2020-04-17
Attending: INTERNAL MEDICINE | Admitting: INTERNAL MEDICINE
Payer: MEDICARE

## 2020-04-10 VITALS
TEMPERATURE: 104 F | RESPIRATION RATE: 42 BRPM | SYSTOLIC BLOOD PRESSURE: 148 MMHG | WEIGHT: 110.01 LBS | OXYGEN SATURATION: 98 % | HEART RATE: 118 BPM | HEIGHT: 62 IN | DIASTOLIC BLOOD PRESSURE: 70 MMHG

## 2020-04-10 DIAGNOSIS — B97.21 SARS-ASSOCIATED CORONAVIRUS AS THE CAUSE OF DISEASES CLASSIFIED ELSEWHERE: ICD-10-CM

## 2020-04-10 DIAGNOSIS — J96.01 ACUTE RESPIRATORY FAILURE WITH HYPOXIA: ICD-10-CM

## 2020-04-10 DIAGNOSIS — K59.00 CONSTIPATION, UNSPECIFIED: ICD-10-CM

## 2020-04-10 DIAGNOSIS — G20 PARKINSON'S DISEASE: ICD-10-CM

## 2020-04-10 DIAGNOSIS — Z96.649 PRESENCE OF UNSPECIFIED ARTIFICIAL HIP JOINT: Chronic | ICD-10-CM

## 2020-04-10 DIAGNOSIS — G62.9 POLYNEUROPATHY, UNSPECIFIED: ICD-10-CM

## 2020-04-10 DIAGNOSIS — E87.0 HYPEROSMOLALITY AND HYPERNATREMIA: ICD-10-CM

## 2020-04-10 DIAGNOSIS — E87.6 HYPOKALEMIA: ICD-10-CM

## 2020-04-10 LAB
ALBUMIN SERPL ELPH-MCNC: 3.1 G/DL — LOW (ref 3.3–5)
ALP SERPL-CCNC: 54 U/L — SIGNIFICANT CHANGE UP (ref 30–120)
ALT FLD-CCNC: 34 U/L DA — SIGNIFICANT CHANGE UP (ref 10–60)
ANION GAP SERPL CALC-SCNC: 12 MMOL/L — SIGNIFICANT CHANGE UP (ref 5–17)
APPEARANCE UR: CLEAR — SIGNIFICANT CHANGE UP
APTT BLD: 34.5 SEC — SIGNIFICANT CHANGE UP (ref 28.5–37)
AST SERPL-CCNC: 161 U/L — HIGH (ref 10–40)
BACTERIA # UR AUTO: ABNORMAL
BASE EXCESS BLDA CALC-SCNC: -0.2 MMOL/L — SIGNIFICANT CHANGE UP (ref -2–2)
BASOPHILS # BLD AUTO: 0 K/UL — SIGNIFICANT CHANGE UP (ref 0–0.2)
BASOPHILS NFR BLD AUTO: 0 % — SIGNIFICANT CHANGE UP (ref 0–2)
BILIRUB SERPL-MCNC: 0.5 MG/DL — SIGNIFICANT CHANGE UP (ref 0.2–1.2)
BILIRUB UR-MCNC: NEGATIVE — SIGNIFICANT CHANGE UP
BLOOD GAS SOURCE: SIGNIFICANT CHANGE UP
BUN SERPL-MCNC: 43 MG/DL — HIGH (ref 7–23)
CALCIUM SERPL-MCNC: 8.4 MG/DL — SIGNIFICANT CHANGE UP (ref 8.4–10.5)
CHLORIDE SERPL-SCNC: 108 MMOL/L — SIGNIFICANT CHANGE UP (ref 96–108)
CO2 SERPL-SCNC: 27 MMOL/L — SIGNIFICANT CHANGE UP (ref 22–31)
COLOR SPEC: YELLOW — SIGNIFICANT CHANGE UP
CREAT SERPL-MCNC: 1.36 MG/DL — HIGH (ref 0.5–1.3)
CRP SERPL-MCNC: 19.48 MG/DL — HIGH (ref 0–0.4)
CRP SERPL-MCNC: 19.65 MG/DL — HIGH (ref 0–0.4)
D DIMER BLD IA.RAPID-MCNC: 927 NG/ML DDU — HIGH
DIFF PNL FLD: ABNORMAL
EOSINOPHIL # BLD AUTO: 0 K/UL — SIGNIFICANT CHANGE UP (ref 0–0.5)
EOSINOPHIL NFR BLD AUTO: 0 % — SIGNIFICANT CHANGE UP (ref 0–6)
EPI CELLS # UR: SIGNIFICANT CHANGE UP
FERRITIN SERPL-MCNC: 633 NG/ML — HIGH (ref 15–150)
FIBRINOGEN PPP-MCNC: 725 MG/DL — HIGH (ref 320–500)
GLUCOSE SERPL-MCNC: 89 MG/DL — SIGNIFICANT CHANGE UP (ref 70–99)
GLUCOSE UR QL: NEGATIVE MG/DL — SIGNIFICANT CHANGE UP
GRAN CASTS # UR COMP ASSIST: ABNORMAL /LPF
HCO3 BLDA-SCNC: 25 MMOL/L — SIGNIFICANT CHANGE UP (ref 21–29)
HCT VFR BLD CALC: 36.7 % — SIGNIFICANT CHANGE UP (ref 34.5–45)
HGB BLD-MCNC: 11.5 G/DL — SIGNIFICANT CHANGE UP (ref 11.5–15.5)
HOROWITZ INDEX BLDA+IHG-RTO: 100 — SIGNIFICANT CHANGE UP
HYPOCHROMIA BLD QL: SLIGHT — SIGNIFICANT CHANGE UP
INR BLD: 1.3 RATIO — HIGH (ref 0.88–1.16)
KETONES UR-MCNC: ABNORMAL
LACTATE SERPL-SCNC: 1.8 MMOL/L — SIGNIFICANT CHANGE UP (ref 0.7–2)
LDH SERPL L TO P-CCNC: 501 U/L — HIGH (ref 50–242)
LDH SERPL L TO P-CCNC: 593 U/L — HIGH (ref 50–242)
LEUKOCYTE ESTERASE UR-ACNC: ABNORMAL
LYMPHOCYTES # BLD AUTO: 0.91 K/UL — LOW (ref 1–3.3)
LYMPHOCYTES # BLD AUTO: 6 % — LOW (ref 13–44)
MANUAL SMEAR VERIFICATION: SIGNIFICANT CHANGE UP
MCHC RBC-ENTMCNC: 29.9 PG — SIGNIFICANT CHANGE UP (ref 27–34)
MCHC RBC-ENTMCNC: 31.3 GM/DL — LOW (ref 32–36)
MCV RBC AUTO: 95.3 FL — SIGNIFICANT CHANGE UP (ref 80–100)
MONOCYTES # BLD AUTO: 0.76 K/UL — SIGNIFICANT CHANGE UP (ref 0–0.9)
MONOCYTES NFR BLD AUTO: 5 % — SIGNIFICANT CHANGE UP (ref 2–14)
NEUTROPHILS # BLD AUTO: 13.47 K/UL — HIGH (ref 1.8–7.4)
NEUTROPHILS NFR BLD AUTO: 76 % — SIGNIFICANT CHANGE UP (ref 43–77)
NEUTS BAND # BLD: 13 % — HIGH (ref 0–8)
NITRITE UR-MCNC: NEGATIVE — SIGNIFICANT CHANGE UP
NRBC # BLD: 0 — SIGNIFICANT CHANGE UP
NRBC # BLD: SIGNIFICANT CHANGE UP /100 WBCS (ref 0–0)
NT-PROBNP SERPL-SCNC: 1714 PG/ML — HIGH (ref 0–450)
PCO2 BLDA: 36 MMHG — SIGNIFICANT CHANGE UP (ref 32–46)
PH BLD: 7.44 — SIGNIFICANT CHANGE UP (ref 7.35–7.45)
PH UR: 5 — SIGNIFICANT CHANGE UP (ref 5–8)
PLAT MORPH BLD: NORMAL — SIGNIFICANT CHANGE UP
PLATELET # BLD AUTO: 191 K/UL — SIGNIFICANT CHANGE UP (ref 150–400)
PO2 BLDA: 85 MMHG — SIGNIFICANT CHANGE UP (ref 74–108)
POTASSIUM SERPL-MCNC: 3.2 MMOL/L — LOW (ref 3.5–5.3)
POTASSIUM SERPL-SCNC: 3.2 MMOL/L — LOW (ref 3.5–5.3)
PROCALCITONIN SERPL-MCNC: 8.31 NG/ML — HIGH (ref 0.02–0.1)
PROT SERPL-MCNC: 7.2 G/DL — SIGNIFICANT CHANGE UP (ref 6–8.3)
PROT UR-MCNC: 100 MG/DL
PROTHROM AB SERPL-ACNC: 14.5 SEC — HIGH (ref 10–12.9)
RBC # BLD: 3.85 M/UL — SIGNIFICANT CHANGE UP (ref 3.8–5.2)
RBC # FLD: 13.3 % — SIGNIFICANT CHANGE UP (ref 10.3–14.5)
RBC BLD AUTO: NORMAL — SIGNIFICANT CHANGE UP
RBC CASTS # UR COMP ASSIST: ABNORMAL /HPF (ref 0–4)
SAO2 % BLDA: 96 % — SIGNIFICANT CHANGE UP (ref 92–96)
SARS-COV-2 RNA SPEC QL NAA+PROBE: DETECTED
SODIUM SERPL-SCNC: 147 MMOL/L — HIGH (ref 135–145)
SP GR SPEC: 1.02 — SIGNIFICANT CHANGE UP (ref 1.01–1.02)
TROPONIN I SERPL-MCNC: 0.11 NG/ML — HIGH (ref 0.02–0.06)
UROBILINOGEN FLD QL: 1 MG/DL
WBC # BLD: 15.13 K/UL — HIGH (ref 3.8–10.5)
WBC # FLD AUTO: 15.13 K/UL — HIGH (ref 3.8–10.5)
WBC UR QL: SIGNIFICANT CHANGE UP

## 2020-04-10 PROCEDURE — 99223 1ST HOSP IP/OBS HIGH 75: CPT

## 2020-04-10 PROCEDURE — 71045 X-RAY EXAM CHEST 1 VIEW: CPT | Mod: 26

## 2020-04-10 PROCEDURE — 99291 CRITICAL CARE FIRST HOUR: CPT | Mod: CS

## 2020-04-10 PROCEDURE — 93010 ELECTROCARDIOGRAM REPORT: CPT

## 2020-04-10 RX ORDER — THIAMINE MONONITRATE (VIT B1) 100 MG
1 TABLET ORAL
Qty: 0 | Refills: 0 | DISCHARGE

## 2020-04-10 RX ORDER — CEFTRIAXONE 500 MG/1
1000 INJECTION, POWDER, FOR SOLUTION INTRAMUSCULAR; INTRAVENOUS ONCE
Refills: 0 | Status: COMPLETED | OUTPATIENT
Start: 2020-04-10 | End: 2020-04-10

## 2020-04-10 RX ORDER — MAGNESIUM SULFATE 500 MG/ML
1 VIAL (ML) INJECTION ONCE
Refills: 0 | Status: COMPLETED | OUTPATIENT
Start: 2020-04-10 | End: 2020-04-10

## 2020-04-10 RX ORDER — ZINC SULFATE TAB 220 MG (50 MG ZINC EQUIVALENT) 220 (50 ZN) MG
220 TAB ORAL DAILY
Refills: 0 | Status: DISCONTINUED | OUTPATIENT
Start: 2020-04-10 | End: 2020-04-13

## 2020-04-10 RX ORDER — AZITHROMYCIN 500 MG/1
500 TABLET, FILM COATED ORAL DAILY
Refills: 0 | Status: DISCONTINUED | OUTPATIENT
Start: 2020-04-10 | End: 2020-04-11

## 2020-04-10 RX ORDER — ASCORBIC ACID 60 MG
1000 TABLET,CHEWABLE ORAL DAILY
Refills: 0 | Status: DISCONTINUED | OUTPATIENT
Start: 2020-04-10 | End: 2020-04-10

## 2020-04-10 RX ORDER — POTASSIUM CHLORIDE 20 MEQ
40 PACKET (EA) ORAL ONCE
Refills: 0 | Status: DISCONTINUED | OUTPATIENT
Start: 2020-04-10 | End: 2020-04-12

## 2020-04-10 RX ORDER — CARBIDOPA AND LEVODOPA 25; 100 MG/1; MG/1
1 TABLET ORAL THREE TIMES A DAY
Refills: 0 | Status: DISCONTINUED | OUTPATIENT
Start: 2020-04-10 | End: 2020-04-13

## 2020-04-10 RX ORDER — VENLAFAXINE HCL 75 MG
37.5 CAPSULE, EXT RELEASE 24 HR ORAL
Refills: 0 | Status: DISCONTINUED | OUTPATIENT
Start: 2020-04-10 | End: 2020-04-13

## 2020-04-10 RX ORDER — POLYETHYLENE GLYCOL 3350 17 G/17G
17 POWDER, FOR SOLUTION ORAL
Qty: 0 | Refills: 0 | DISCHARGE

## 2020-04-10 RX ORDER — QUETIAPINE FUMARATE 200 MG/1
25 TABLET, FILM COATED ORAL AT BEDTIME
Refills: 0 | Status: DISCONTINUED | OUTPATIENT
Start: 2020-04-10 | End: 2020-04-13

## 2020-04-10 RX ORDER — ACETAMINOPHEN 500 MG
650 TABLET ORAL EVERY 6 HOURS
Refills: 0 | Status: DISCONTINUED | OUTPATIENT
Start: 2020-04-10 | End: 2020-04-13

## 2020-04-10 RX ORDER — SENNA PLUS 8.6 MG/1
2 TABLET ORAL
Qty: 0 | Refills: 0 | DISCHARGE

## 2020-04-10 RX ORDER — HYDROXYCHLOROQUINE SULFATE 200 MG
400 TABLET ORAL EVERY 24 HOURS
Refills: 0 | Status: DISCONTINUED | OUTPATIENT
Start: 2020-04-11 | End: 2020-04-13

## 2020-04-10 RX ORDER — ASCORBIC ACID 60 MG
1000 TABLET,CHEWABLE ORAL DAILY
Refills: 0 | Status: DISCONTINUED | OUTPATIENT
Start: 2020-04-10 | End: 2020-04-13

## 2020-04-10 RX ORDER — ZINC SULFATE TAB 220 MG (50 MG ZINC EQUIVALENT) 220 (50 ZN) MG
1 TAB ORAL
Qty: 0 | Refills: 0 | DISCHARGE

## 2020-04-10 RX ORDER — OMEPRAZOLE 10 MG/1
1 CAPSULE, DELAYED RELEASE ORAL
Qty: 0 | Refills: 0 | DISCHARGE

## 2020-04-10 RX ORDER — HYDROXYCHLOROQUINE SULFATE 200 MG
TABLET ORAL
Refills: 0 | Status: DISCONTINUED | OUTPATIENT
Start: 2020-04-10 | End: 2020-04-13

## 2020-04-10 RX ORDER — POTASSIUM CHLORIDE 20 MEQ
10 PACKET (EA) ORAL
Refills: 0 | Status: COMPLETED | OUTPATIENT
Start: 2020-04-10 | End: 2020-04-10

## 2020-04-10 RX ORDER — ACETAMINOPHEN 500 MG
650 TABLET ORAL ONCE
Refills: 0 | Status: COMPLETED | OUTPATIENT
Start: 2020-04-10 | End: 2020-04-10

## 2020-04-10 RX ORDER — POLYETHYLENE GLYCOL 3350 17 G/17G
17 POWDER, FOR SOLUTION ORAL DAILY
Refills: 0 | Status: DISCONTINUED | OUTPATIENT
Start: 2020-04-10 | End: 2020-04-13

## 2020-04-10 RX ORDER — ACETAMINOPHEN 500 MG
2 TABLET ORAL
Qty: 0 | Refills: 0 | DISCHARGE

## 2020-04-10 RX ORDER — DOCUSATE SODIUM 100 MG
1 CAPSULE ORAL
Qty: 0 | Refills: 0 | DISCHARGE

## 2020-04-10 RX ORDER — HYDROXYCHLOROQUINE SULFATE 200 MG
800 TABLET ORAL EVERY 24 HOURS
Refills: 0 | Status: COMPLETED | OUTPATIENT
Start: 2020-04-10 | End: 2020-04-10

## 2020-04-10 RX ORDER — POTASSIUM CHLORIDE 20 MEQ
20 PACKET (EA) ORAL
Refills: 0 | Status: DISCONTINUED | OUTPATIENT
Start: 2020-04-10 | End: 2020-04-10

## 2020-04-10 RX ORDER — POTASSIUM CHLORIDE 20 MEQ
40 PACKET (EA) ORAL DAILY
Refills: 0 | Status: DISCONTINUED | OUTPATIENT
Start: 2020-04-10 | End: 2020-04-10

## 2020-04-10 RX ORDER — ASCORBIC ACID 60 MG
1 TABLET,CHEWABLE ORAL
Qty: 0 | Refills: 0 | DISCHARGE

## 2020-04-10 RX ORDER — AZITHROMYCIN 500 MG/1
500 TABLET, FILM COATED ORAL ONCE
Refills: 0 | Status: COMPLETED | OUTPATIENT
Start: 2020-04-10 | End: 2020-04-10

## 2020-04-10 RX ORDER — ENOXAPARIN SODIUM 100 MG/ML
30 INJECTION SUBCUTANEOUS DAILY
Refills: 0 | Status: DISCONTINUED | OUTPATIENT
Start: 2020-04-10 | End: 2020-04-13

## 2020-04-10 RX ORDER — FAMOTIDINE 10 MG/ML
20 INJECTION INTRAVENOUS DAILY
Refills: 0 | Status: DISCONTINUED | OUTPATIENT
Start: 2020-04-10 | End: 2020-04-13

## 2020-04-10 RX ORDER — MORPHINE SULFATE 50 MG/1
2 CAPSULE, EXTENDED RELEASE ORAL EVERY 4 HOURS
Refills: 0 | Status: DISCONTINUED | OUTPATIENT
Start: 2020-04-10 | End: 2020-04-13

## 2020-04-10 RX ORDER — RASAGILINE 0.5 MG/1
1 TABLET ORAL DAILY
Refills: 0 | Status: DISCONTINUED | OUTPATIENT
Start: 2020-04-10 | End: 2020-04-13

## 2020-04-10 RX ORDER — SODIUM CHLORIDE 9 MG/ML
1000 INJECTION, SOLUTION INTRAVENOUS
Refills: 0 | Status: DISCONTINUED | OUTPATIENT
Start: 2020-04-10 | End: 2020-04-13

## 2020-04-10 RX ADMIN — Medication 100 MILLIEQUIVALENT(S): at 13:01

## 2020-04-10 RX ADMIN — Medication 10 MILLIEQUIVALENT(S): at 13:00

## 2020-04-10 RX ADMIN — Medication 100 MILLIEQUIVALENT(S): at 14:05

## 2020-04-10 RX ADMIN — MORPHINE SULFATE 2 MILLIGRAM(S): 50 CAPSULE, EXTENDED RELEASE ORAL at 16:21

## 2020-04-10 RX ADMIN — Medication 100 MILLIEQUIVALENT(S): at 15:00

## 2020-04-10 RX ADMIN — Medication 650 MILLIGRAM(S): at 16:07

## 2020-04-10 RX ADMIN — CEFTRIAXONE 1000 MILLIGRAM(S): 500 INJECTION, POWDER, FOR SOLUTION INTRAMUSCULAR; INTRAVENOUS at 13:44

## 2020-04-10 RX ADMIN — Medication 100 GRAM(S): at 14:06

## 2020-04-10 RX ADMIN — SODIUM CHLORIDE 75 MILLILITER(S): 9 INJECTION, SOLUTION INTRAVENOUS at 16:08

## 2020-04-10 RX ADMIN — Medication 650 MILLIGRAM(S): at 11:00

## 2020-04-10 RX ADMIN — AZITHROMYCIN 255 MILLIGRAM(S): 500 TABLET, FILM COATED ORAL at 13:45

## 2020-04-10 RX ADMIN — CEFTRIAXONE 100 MILLIGRAM(S): 500 INJECTION, POWDER, FOR SOLUTION INTRAMUSCULAR; INTRAVENOUS at 13:01

## 2020-04-10 NOTE — H&P ADULT - ASSESSMENT
78 y/o f with past hx of parkinsonism, dementia, peripheral neuropathy, gerd, chronic constipation who was brought in to ED from group home for complaints of high grade fever and SHOB with RA hypoxia of 60. The patient was found ot have fever of 101 at group home where there is outbreak of covid also. the pt has been hospitalized for covid rule out and worsening hypoxia.    DNR/DNI

## 2020-04-10 NOTE — ED PROVIDER NOTE - CLINICAL SUMMARY MEDICAL DECISION MAKING FREE TEXT BOX
Dr. Jackson Note: elderly patient with respiratory failure and signs of pneumonia, antibx, avoid fluid overload, and covid treatment.  Advance code status

## 2020-04-10 NOTE — ED PROVIDER NOTE - OBJECTIVE STATEMENT
Dr. Jackson Note: 79F from assisted living with h/o Parkinson's and Dementia with acute onset of fever today associated with respiratory distress and pulse ox 60% reported PTA.  Hx limited from patient, obtained from EMS, NH, and pt's daughter.

## 2020-04-10 NOTE — H&P ADULT - PROBLEM SELECTOR PLAN 1
admit to medicine on pulse ox  cont nasal canula to maintain sats more than 91% when awake and 88 or above when asleep.  HCQ & Azithromycin for covid pneumonia  ventolin MDI, Tylenol pen for fever  LDH/D DIMMER/CRP/FERRITIN in am

## 2020-04-10 NOTE — H&P ADULT - HISTORY OF PRESENT ILLNESS
Patient is a 79y old  Female who presents with a chief complaint of fever and shob    HPI: this is an 80 y/o f with past hx of parkinsonism, dementia, peripheral neuropathy, gerd, chronic constipation who was brought in to ED from group home for complainst of high grade fever and SHOB with RA hypoxia of 60. The patient was found ot have fever of 101 at group home where there is outbreak of covid also.  she was started on NC oxygen in ED and was made DNR/DNI by ER attending after discussing with daughter.  The patient her self is nonverbal.        PAST MEDICAL & SURGICAL HISTORY:  Scoliosis  Neuropathy  Parkinson's disease  Status post hip hemiarthroplasty      FAMILY HISTORY:  Family history of age of death: mother  of old age  Family history of Parkinson disease      SOCIAL HISTORY: non smoker in hx    Allergies    No Known Allergies    Intolerances          REVIEW OF SYSEMS:  General: on NC  Skin/Breast: no rash, no jaundice  Respiratory and Thorax: shallow breathing with dec bs at bases  Cardiovascular: no chest pain, no shortness of breath, no orthopnea  Gastrointestinal: no distension  Genitourinary: no nocturia, no hematuria  Musculoskeletal: no trauma  Neurological:+dementia  Psychiatric: pt is non verbal and demented  Hematology/Lymphatics: no easy bruising  Endocrine: no heat or cold intolerance. no weight gain or loss  Allergic/Immunologic: no allergy or recent reaction       Vital Signs Last 24 Hrs  T(C): 38.9 (10 Apr 2020 13:00), Max: 40.2 (10 Apr 2020 10:44)  T(F): 102 (10 Apr 2020 13:00), Max: 104.3 (10 Apr 2020 10:44)  HR: 92 (10 Apr 2020 13:00) (92 - 118)  BP: 128/63 (10 Apr 2020 13:00) (128/63 - 148/70)  BP(mean): --  RR: 30 (10 Apr 2020 13:00) (30 - 42)  SpO2: 100% (10 Apr 2020 13:00) (96% - 100%)  I&O's Summary      PHYSICAL EXAM:  GENERAL:on nc  HEAD:  Atraumatic, Normocephalic  NECK: Supple, No JVD with tilt to right side with scoliosis  CHEST/LUNG: dec bs at bases with rhonchi  HEART: Regular rate and rhythm; No murmurs, rubs, or gallops  ABDOMEN: Soft, Nontender, Nondistended; Bowel sounds present  Neuro: non verbal  EXTREMITIES:  2+ Peripheral Pulses  LABS:                        11.5   15.13 )-----------( 191      ( 10 Apr 2020 11:16 )             36.7     04-10    147<H>  |  108  |  43<H>  ----------------------------<  89  3.2<L>   |  27  |  1.36<H>    Ca    8.4      10 Apr 2020 11:16    TPro  7.2  /  Alb  3.1<L>  /  TBili  0.5  /  DBili  x   /  AST  161<H>  /  ALT  34  /  AlkPhos  54  04-10    PT/INR - ( 10 Apr 2020 11:16 )   PT: 14.5 sec;   INR: 1.30 ratio         PTT - ( 10 Apr 2020 11:16 )  PTT:34.5 sec  CAPILLARY BLOOD GLUCOSE        CARDIAC MARKERS ( 10 Apr 2020 11:16 )  .111 ng/mL / x     / x     / x     / x          Urinalysis Basic - ( 10 Apr 2020 11:16 )    Color: Yellow / Appearance: Clear / S.020 / pH: x  Gluc: x / Ketone: Trace  / Bili: Negative / Urobili: 1 mg/dL   Blood: x / Protein: 100 mg/dL / Nitrite: Negative   Leuk Esterase: Trace / RBC: 3-5 /HPF / WBC 3-5   Sq Epi: x / Non Sq Epi: Few / Bacteria: Moderate        RADIOLOGY & ADDITIONAL TESTS:    Imaging Personally Reviewed:  [x] YES  [ ] NO    Consultant(s) Notes Reviewed:  [x] YES  [ ] NO      MEDICATIONS  (STANDING):  ascorbic acid  Oral Tab/Cap - Peds 1000 milliGRAM(s) Oral daily  azithromycin   Tablet 500 milliGRAM(s) Oral daily  carbidopa/levodopa  25/100 1 Tablet(s) Oral three times a day  enoxaparin Injectable 30 milliGRAM(s) SubCutaneous daily  famotidine    Tablet 20 milliGRAM(s) Oral daily  hydroxychloroquine   Oral   polyethylene glycol 3350 17 Gram(s) Oral daily  potassium chloride  10 mEq/100 mL IVPB 10 milliEquivalent(s) IV Intermittent every 1 hour  QUEtiapine 25 milliGRAM(s) Oral at bedtime  rasagiline Tablet 1 milliGRAM(s) Oral daily  venlafaxine 37.5 milliGRAM(s) Oral two times a day with meals  zinc sulfate 220 milliGRAM(s) Oral daily    MEDICATIONS  (PRN):  acetaminophen   Tablet .. 650 milliGRAM(s) Oral every 6 hours PRN Temp greater or equal to 38C (100.4F), Mild Pain (1 - 3)      Care Discussed with Consultants/Other Providers [x] YES  [ ] NO    HEALTH ISSUES - PROBLEM Dx:

## 2020-04-10 NOTE — ED PROVIDER NOTE - CRITICAL CARE PROVIDED
direct patient care (not related to procedure)/consultation with other physicians/conducted a detailed discussion of DNR status/documentation/telephone consultation with the patient's family/interpretation of diagnostic studies/additional history taking

## 2020-04-10 NOTE — ED PROVIDER NOTE - PROGRESS NOTE DETAILS
Dr. Jackson Note: had 20 minute conversation with pt's daughter on phone, agrees with DNR/DNI, continue full treatment otherwise, understands poor prognosis, will treat patient for pneumonia, high suspicion for covid, could also have concominant bacterial pneumonia, replete lytes, stable for floors.

## 2020-04-10 NOTE — H&P ADULT - PROBLEM SELECTOR PLAN 2
on Nasal cannula  started on hcq and azithro as she is clinically c/w covid 19 pneumonia  no cultures

## 2020-04-10 NOTE — ED ADULT NURSE NOTE - OBJECTIVE STATEMENT
Pt BIBA from [facility] for fever and respiratory distress - pt came  in on 100 % O2 via NRBM . Pt is mildly restless and non verbal - pt is moaning .

## 2020-04-10 NOTE — ED ADULT NURSE NOTE - NSIMPLEMENTINTERV_GEN_ALL_ED
Implemented All Fall Risk Interventions:  [place] to call system. Call bell, personal items and telephone within reach. Instruct patient to call for assistance. Room bathroom lighting operational. Non-slip footwear when patient is off stretcher. Physically safe environment: no spills, clutter or unnecessary equipment. Stretcher in lowest position, wheels locked, appropriate side rails in place. Provide visual cue, wrist band, yellow gown, etc. Monitor gait and stability. Monitor for mental status changes and reorient to person, place, and time. Review medications for side effects contributing to fall risk. Reinforce activity limits and safety measures with patient and family.

## 2020-04-11 DIAGNOSIS — U07.1 COVID-19: ICD-10-CM

## 2020-04-11 DIAGNOSIS — Z29.9 ENCOUNTER FOR PROPHYLACTIC MEASURES, UNSPECIFIED: ICD-10-CM

## 2020-04-11 LAB
ANION GAP SERPL CALC-SCNC: 9 MMOL/L — SIGNIFICANT CHANGE UP (ref 5–17)
BUN SERPL-MCNC: 40 MG/DL — HIGH (ref 7–23)
CALCIUM SERPL-MCNC: 7.9 MG/DL — LOW (ref 8.4–10.5)
CHLORIDE SERPL-SCNC: 109 MMOL/L — HIGH (ref 96–108)
CO2 SERPL-SCNC: 25 MMOL/L — SIGNIFICANT CHANGE UP (ref 22–31)
CREAT SERPL-MCNC: 1.25 MG/DL — SIGNIFICANT CHANGE UP (ref 0.5–1.3)
CULTURE RESULTS: SIGNIFICANT CHANGE UP
FERRITIN SERPL-MCNC: 676 NG/ML — HIGH (ref 15–150)
FERRITIN SERPL-MCNC: 925 NG/ML — HIGH (ref 15–150)
GLUCOSE SERPL-MCNC: 129 MG/DL — HIGH (ref 70–99)
GRAM STN FLD: SIGNIFICANT CHANGE UP
HCT VFR BLD CALC: 31.8 % — LOW (ref 34.5–45)
HGB BLD-MCNC: 10.1 G/DL — LOW (ref 11.5–15.5)
LDH SERPL L TO P-CCNC: 616 U/L — HIGH (ref 50–242)
MCHC RBC-ENTMCNC: 29.9 PG — SIGNIFICANT CHANGE UP (ref 27–34)
MCHC RBC-ENTMCNC: 31.8 GM/DL — LOW (ref 32–36)
MCV RBC AUTO: 94.1 FL — SIGNIFICANT CHANGE UP (ref 80–100)
METHOD TYPE: SIGNIFICANT CHANGE UP
MRSA SPEC QL CULT: SIGNIFICANT CHANGE UP
NRBC # BLD: 0 /100 WBCS — SIGNIFICANT CHANGE UP (ref 0–0)
PLATELET # BLD AUTO: 175 K/UL — SIGNIFICANT CHANGE UP (ref 150–400)
POTASSIUM SERPL-MCNC: 3.5 MMOL/L — SIGNIFICANT CHANGE UP (ref 3.5–5.3)
POTASSIUM SERPL-SCNC: 3.5 MMOL/L — SIGNIFICANT CHANGE UP (ref 3.5–5.3)
RBC # BLD: 3.38 M/UL — LOW (ref 3.8–5.2)
RBC # FLD: 13.4 % — SIGNIFICANT CHANGE UP (ref 10.3–14.5)
SODIUM SERPL-SCNC: 143 MMOL/L — SIGNIFICANT CHANGE UP (ref 135–145)
SPECIMEN SOURCE: SIGNIFICANT CHANGE UP
SPECIMEN SOURCE: SIGNIFICANT CHANGE UP
WBC # BLD: 14.17 K/UL — HIGH (ref 3.8–10.5)
WBC # FLD AUTO: 14.17 K/UL — HIGH (ref 3.8–10.5)

## 2020-04-11 PROCEDURE — 99232 SBSQ HOSP IP/OBS MODERATE 35: CPT | Mod: CS

## 2020-04-11 RX ORDER — VANCOMYCIN HCL 1 G
VIAL (EA) INTRAVENOUS
Refills: 0 | Status: DISCONTINUED | OUTPATIENT
Start: 2020-04-11 | End: 2020-04-13

## 2020-04-11 RX ORDER — VANCOMYCIN HCL 1 G
750 VIAL (EA) INTRAVENOUS EVERY 24 HOURS
Refills: 0 | Status: DISCONTINUED | OUTPATIENT
Start: 2020-04-12 | End: 2020-04-13

## 2020-04-11 RX ORDER — VANCOMYCIN HCL 1 G
750 VIAL (EA) INTRAVENOUS ONCE
Refills: 0 | Status: COMPLETED | OUTPATIENT
Start: 2020-04-11 | End: 2020-04-11

## 2020-04-11 RX ADMIN — Medication 250 MILLIGRAM(S): at 14:46

## 2020-04-11 RX ADMIN — Medication 650 MILLIGRAM(S): at 05:19

## 2020-04-11 RX ADMIN — ENOXAPARIN SODIUM 30 MILLIGRAM(S): 100 INJECTION SUBCUTANEOUS at 10:56

## 2020-04-11 NOTE — PROGRESS NOTE ADULT - SUBJECTIVE AND OBJECTIVE BOX
Date/Time Patient Seen:  		  Referring MD:   Data Reviewed	       Patient is a 79y old  Female who presents with a chief complaint of fever and shob (11 Apr 2020 11:51)      Subjective/HPI     PAST MEDICAL & SURGICAL HISTORY:  Scoliosis  Neuropathy  Parkinson's disease  Status post hip hemiarthroplasty  No significant past surgical history        Medication list         MEDICATIONS  (STANDING):  ascorbic acid 1000 milliGRAM(s) Oral daily  carbidopa/levodopa  25/100 1 Tablet(s) Oral three times a day  dextrose 5%. 1000 milliLiter(s) (50 mL/Hr) IV Continuous <Continuous>  enoxaparin Injectable 30 milliGRAM(s) SubCutaneous daily  famotidine    Tablet 20 milliGRAM(s) Oral daily  hydroxychloroquine   Oral   hydroxychloroquine 400 milliGRAM(s) Oral every 24 hours  polyethylene glycol 3350 17 Gram(s) Oral daily  potassium chloride   Powder 40 milliEquivalent(s) Oral once  QUEtiapine 25 milliGRAM(s) Oral at bedtime  rasagiline Tablet 1 milliGRAM(s) Oral daily  vancomycin  IVPB      vancomycin  IVPB 750 milliGRAM(s) IV Intermittent once  venlafaxine 37.5 milliGRAM(s) Oral two times a day with meals  zinc sulfate 220 milliGRAM(s) Oral daily    MEDICATIONS  (PRN):  acetaminophen   Tablet .. 650 milliGRAM(s) Oral every 6 hours PRN Temp greater or equal to 38C (100.4F), Mild Pain (1 - 3)  acetaminophen  Suppository .. 650 milliGRAM(s) Rectal every 6 hours PRN Temp greater or equal to 38C (100.4F), Mild Pain (1 - 3)  morphine  - Injectable 2 milliGRAM(s) IV Push every 4 hours PRN Severe Pain (7 - 10)         Vitals log        ICU Vital Signs Last 24 Hrs  T(C): 36.5 (11 Apr 2020 08:43), Max: 38.9 (10 Apr 2020 16:15)  T(F): 97.7 (11 Apr 2020 08:43), Max: 102.1 (10 Apr 2020 16:15)  HR: 84 (11 Apr 2020 08:43) (84 - 112)  BP: 110/61 (11 Apr 2020 08:43) (99/58 - 137/65)  BP(mean): --  ABP: --  ABP(mean): --  RR: 27 (11 Apr 2020 08:43) (27 - 39)  SpO2: 97% (11 Apr 2020 08:43) (97% - 100%)           Input and Output:  I&O's Detail      Lab Data                        10.1   14.17 )-----------( 175      ( 11 Apr 2020 06:16 )             31.8     04-11    143  |  109<H>  |  40<H>  ----------------------------<  129<H>  3.5   |  25  |  1.25    Ca    7.9<L>      11 Apr 2020 06:16    TPro  7.2  /  Alb  3.1<L>  /  TBili  0.5  /  DBili  x   /  AST  161<H>  /  ALT  34  /  AlkPhos  54  04-10    ABG - ( 10 Apr 2020 12:53 )  pH, Arterial: x     pH, Blood: 7.44  /  pCO2: 36    /  pO2: 85    / HCO3: 25    / Base Excess: -0.2  /  SaO2: 96                CARDIAC MARKERS ( 10 Apr 2020 11:16 )  .111 ng/mL / x     / x     / x     / x            Review of Systems	      Objective     Physical Examination    heart s1s2  lung dec BS  abd soft      Pertinent Lab findings & Imaging      [place]:  NO   Adequate UO     I&O's Detail           Discussed with:     Cultures:	  Culture Results:   Growth in aerobic bottle: Gram Positive Cocci in Clusters  "Due to technical problems, Proteus sp. will Not be reported as part of  the BCID panel until further notice" ***Blood Panel PCR results on this  specimen are available  approximately 3 hours after the Gram stain result.***  Gram stain, PCR, and/or culture results may not always  correspond due to difference in methodologies.  ************************************************************  This PCR assay was performed using [employer].  The following targets are tested for: Enterococcus,  vancomycin resistant enterococci, Listeria monocytogenes,  coagulase negative staphylococci, S. aureus,  methicillin resistant S. aureus, Streptococcus agalactiae  (Group B), S. pneumoniae, S.pyogenes (Group A),  Acinetobacter baumannii, Enterobacter cloacae, E. coli,  Klebsiella oxytoca, K. pneumoniae, Proteus sp.,  Serratia marcescens, Haemophilus influenzae,  Neisseria meningitidis, Pseudomonas aeruginosa, Candida  albicans, C. glabrata, C krusei, C parapsilosis,  C. tropicalis and the KPC resistance gene. (04-10 @ 16:44)  Culture Results:   >=3 organisms. Probable collection contamination. (04-10 @ 16:31)        Radiology

## 2020-04-11 NOTE — SWALLOW BEDSIDE ASSESSMENT ADULT - SLP PERTINENT HISTORY OF CURRENT PROBLEM
Per charting, 80 y/o f with past hx of parkinsonism, dementia, peripheral neuropathy, gerd, chronic constipation who was brought in to ED from group home for complaints of high grade fever and SHOB with RA hypoxia of 60. The patient was found ot have fever of 101 at group home where there is outbreak of covid also. the pt has been hospitalized for covid rule out and worsening hypoxia.

## 2020-04-11 NOTE — SWALLOW BEDSIDE ASSESSMENT ADULT - COMMENTS
Consult received and chart reviewed. Per discussion with RN, pt currently requires 100% non-rebreather mask as supplemental means of O2, unable to tolerate NC at this time. Please reconsult this service when pt is able to tolerate NC or room air for at least 1-2 hours in order to reduce risk of aspiration and subsequent respiratory distress in the setting of COVID+. Consider short-term non-oral means of nutrition/hydration/medication to reduce risk of aspiration and ensure pt is meeting adequate daily caloric needs. Called out to PA.

## 2020-04-11 NOTE — PROGRESS NOTE ADULT - ASSESSMENT
80 y/o f with past hx of parkinsonism, dementia, peripheral neuropathy, gerd, chronic constipation who was brought in to ED from group home for complaints of high grade fever and SHOB with RA hypoxia of 60. The patient was found ot have fever of 101 at group home where there is outbreak of covid also. Admitted for COVID PNA.     DNR/DNI

## 2020-04-11 NOTE — PROGRESS NOTE ADULT - SUBJECTIVE AND OBJECTIVE BOX
Patient is a 79y old  Female who presents with a chief complaint of fever and shob (10 Apr 2020 14:21)      INTERVAL HPI/OVERNIGHT EVENTS: Tm 100.7, on NRB.     MEDICATIONS  (STANDING):  ascorbic acid 1000 milliGRAM(s) Oral daily  azithromycin   Tablet 500 milliGRAM(s) Oral daily  carbidopa/levodopa  25/100 1 Tablet(s) Oral three times a day  dextrose 5%. 1000 milliLiter(s) (75 mL/Hr) IV Continuous <Continuous>  enoxaparin Injectable 30 milliGRAM(s) SubCutaneous daily  famotidine    Tablet 20 milliGRAM(s) Oral daily  hydroxychloroquine   Oral   hydroxychloroquine 400 milliGRAM(s) Oral every 24 hours  polyethylene glycol 3350 17 Gram(s) Oral daily  potassium chloride   Powder 40 milliEquivalent(s) Oral once  QUEtiapine 25 milliGRAM(s) Oral at bedtime  rasagiline Tablet 1 milliGRAM(s) Oral daily  venlafaxine 37.5 milliGRAM(s) Oral two times a day with meals  zinc sulfate 220 milliGRAM(s) Oral daily    MEDICATIONS  (PRN):  acetaminophen   Tablet .. 650 milliGRAM(s) Oral every 6 hours PRN Temp greater or equal to 38C (100.4F), Mild Pain (1 - 3)  acetaminophen  Suppository .. 650 milliGRAM(s) Rectal every 6 hours PRN Temp greater or equal to 38C (100.4F), Mild Pain (1 - 3)  morphine  - Injectable 2 milliGRAM(s) IV Push every 4 hours PRN Severe Pain (7 - 10)      Allergies    No Known Allergies    Intolerances        REVIEW OF SYSTEMS:  unable to obtain due to patient condition.     Vital Signs Last 24 Hrs  T(C): 36.5 (2020 08:43), Max: 38.9 (10 Apr 2020 13:00)  T(F): 97.7 (2020 08:43), Max: 102.1 (10 Apr 2020 16:15)  HR: 84 (2020 08:43) (84 - 112)  BP: 110/61 (2020 08:43) (99/58 - 139/72)  BP(mean): --  RR: 27 (2020 08:43) (26 - 39)  SpO2: 97% (2020 08:43) (96% - 100%)    PHYSICAL EXAM: limited due to patient condition  GENERAL: NAD  HEAD:  Atraumatic, Normocephalic  EYES: PERRLA, conjunctiva and sclera clear  ENMT: dry mucous membranes, No lesions; No tonsillar erythema, exudates, or enlargement  NECK: Supple  NERVOUS SYSTEM:  does not respond to commands   CHEST/LUNG: Bilateral rales and rhonchi  HEART: Regular rate and rhythm; No murmurs, rubs, or gallops  ABDOMEN: Soft, Nontender, Nondistended; Bowel sounds present  EXTREMITIES:  2+ Peripheral Pulses, No clubbing, cyanosis, or edema  LYMPH: No lymphadenopathy noted  SKIN: No rashes or lesions    LABS:                        10.1   14.17 )-----------( 175      ( 2020 06:16 )             31.8     2020 06:16    143    |  109    |  40     ----------------------------<  129    3.5     |  25     |  1.25     Ca    7.9        2020 06:16      PT/INR - ( 10 Apr 2020 11:16 )   PT: 14.5 sec;   INR: 1.30 ratio         PTT - ( 10 Apr 2020 11:16 )  PTT:34.5 sec  Urinalysis Basic - ( 10 Apr 2020 11:16 )    Color: Yellow / Appearance: Clear / S.020 / pH: x  Gluc: x / Ketone: Trace  / Bili: Negative / Urobili: 1 mg/dL   Blood: x / Protein: 100 mg/dL / Nitrite: Negative   Leuk Esterase: Trace / RBC: 3-5 /HPF / WBC 3-5   Sq Epi: x / Non Sq Epi: Few / Bacteria: Moderate    Advanced Directives: [X ] DNR

## 2020-04-11 NOTE — PROGRESS NOTE ADULT - ATTENDING COMMENTS
Discussed plan in detail with daughter (HCP). Understands patient's prognosis and stated that she would not protracted treatment if it becomes futile. Will monitor closely.

## 2020-04-12 DIAGNOSIS — R78.81 BACTEREMIA: ICD-10-CM

## 2020-04-12 LAB
ANION GAP SERPL CALC-SCNC: 6 MMOL/L — SIGNIFICANT CHANGE UP (ref 5–17)
BASOPHILS # BLD AUTO: 0.02 K/UL — SIGNIFICANT CHANGE UP (ref 0–0.2)
BASOPHILS NFR BLD AUTO: 0.2 % — SIGNIFICANT CHANGE UP (ref 0–2)
BUN SERPL-MCNC: 35 MG/DL — HIGH (ref 7–23)
CALCIUM SERPL-MCNC: 8.1 MG/DL — LOW (ref 8.4–10.5)
CHLORIDE SERPL-SCNC: 107 MMOL/L — SIGNIFICANT CHANGE UP (ref 96–108)
CO2 SERPL-SCNC: 27 MMOL/L — SIGNIFICANT CHANGE UP (ref 22–31)
CREAT SERPL-MCNC: 0.99 MG/DL — SIGNIFICANT CHANGE UP (ref 0.5–1.3)
EOSINOPHIL # BLD AUTO: 0 K/UL — SIGNIFICANT CHANGE UP (ref 0–0.5)
EOSINOPHIL NFR BLD AUTO: 0 % — SIGNIFICANT CHANGE UP (ref 0–6)
GLUCOSE SERPL-MCNC: 139 MG/DL — HIGH (ref 70–99)
HCT VFR BLD CALC: 33.1 % — LOW (ref 34.5–45)
HGB BLD-MCNC: 10.6 G/DL — LOW (ref 11.5–15.5)
IMM GRANULOCYTES NFR BLD AUTO: 0.5 % — SIGNIFICANT CHANGE UP (ref 0–1.5)
LYMPHOCYTES # BLD AUTO: 0.4 K/UL — LOW (ref 1–3.3)
LYMPHOCYTES # BLD AUTO: 3.8 % — LOW (ref 13–44)
MAGNESIUM SERPL-MCNC: 2.4 MG/DL — SIGNIFICANT CHANGE UP (ref 1.6–2.6)
MCHC RBC-ENTMCNC: 29.4 PG — SIGNIFICANT CHANGE UP (ref 27–34)
MCHC RBC-ENTMCNC: 32 GM/DL — SIGNIFICANT CHANGE UP (ref 32–36)
MCV RBC AUTO: 91.7 FL — SIGNIFICANT CHANGE UP (ref 80–100)
MONOCYTES # BLD AUTO: 0.26 K/UL — SIGNIFICANT CHANGE UP (ref 0–0.9)
MONOCYTES NFR BLD AUTO: 2.4 % — SIGNIFICANT CHANGE UP (ref 2–14)
NEUTROPHILS # BLD AUTO: 9.93 K/UL — HIGH (ref 1.8–7.4)
NEUTROPHILS NFR BLD AUTO: 93.1 % — HIGH (ref 43–77)
NRBC # BLD: 0 /100 WBCS — SIGNIFICANT CHANGE UP (ref 0–0)
PHOSPHATE SERPL-MCNC: 2.3 MG/DL — LOW (ref 2.5–4.5)
PLATELET # BLD AUTO: 177 K/UL — SIGNIFICANT CHANGE UP (ref 150–400)
POTASSIUM SERPL-MCNC: 3.4 MMOL/L — LOW (ref 3.5–5.3)
POTASSIUM SERPL-SCNC: 3.4 MMOL/L — LOW (ref 3.5–5.3)
RBC # BLD: 3.61 M/UL — LOW (ref 3.8–5.2)
RBC # FLD: 13.6 % — SIGNIFICANT CHANGE UP (ref 10.3–14.5)
SODIUM SERPL-SCNC: 140 MMOL/L — SIGNIFICANT CHANGE UP (ref 135–145)
WBC # BLD: 10.66 K/UL — HIGH (ref 3.8–10.5)
WBC # FLD AUTO: 10.66 K/UL — HIGH (ref 3.8–10.5)

## 2020-04-12 PROCEDURE — 99232 SBSQ HOSP IP/OBS MODERATE 35: CPT | Mod: CS

## 2020-04-12 RX ORDER — POTASSIUM CHLORIDE 20 MEQ
10 PACKET (EA) ORAL ONCE
Refills: 0 | Status: COMPLETED | OUTPATIENT
Start: 2020-04-12 | End: 2020-04-12

## 2020-04-12 RX ADMIN — Medication 250 MILLIGRAM(S): at 11:03

## 2020-04-12 RX ADMIN — Medication 100 MILLIEQUIVALENT(S): at 21:09

## 2020-04-12 RX ADMIN — Medication 650 MILLIGRAM(S): at 00:54

## 2020-04-12 RX ADMIN — ENOXAPARIN SODIUM 30 MILLIGRAM(S): 100 INJECTION SUBCUTANEOUS at 09:53

## 2020-04-12 RX ADMIN — MORPHINE SULFATE 2 MILLIGRAM(S): 50 CAPSULE, EXTENDED RELEASE ORAL at 09:58

## 2020-04-12 NOTE — DIETITIAN INITIAL EVALUATION ADULT. - OTHER INFO
78 yo female admit to hospital from [facility] due to high fevers, SOB, and hypoxia; patient admit with +COVID19 and hypernatremia; patient is currently NPO due to difficulty breathing and requires non breather mask @ all times; SLP attempted eval but unable to complete due to requirement for non breather mask; as per NSG, patient having difficulty swallowing pills also; spoke with Steffany from [facility] ([phone]) for pmh; patient normally has good PO intake of Regular diet (no restrictions) and makes her needs known; NKFA; no weight loss reported as UBW ~104-105# (current admit weight ~109#); no edema noted; NSG reports that skin is bruised; Steffany mentioned that patient has a history of rashes of Adolfo LE and has been seen by wound care in the past; at this time, PO appears contraindicated, so patient would benefit from alternate means of nutrition/hydration; consider NG tube to provide enteral feedings of Jevity 1.5 calorie @ 30 ml/hr x 20 hrs (600ml formula, 900kcal, and 57gm protein); if patient tolerates, consider advancing to goal rate of 45ml/hr x 20 hrs; if patient's breathing improves, consider advancement of PO diet; will follow; RD to remain available

## 2020-04-12 NOTE — PROGRESS NOTE ADULT - SUBJECTIVE AND OBJECTIVE BOX
Date/Time Patient Seen:  		  Referring MD:   Data Reviewed	       Patient is a 79y old  Female who presents with a chief complaint of fever and shob (11 Apr 2020 14:22)      Subjective/HPI     PAST MEDICAL & SURGICAL HISTORY:  Scoliosis  Neuropathy  Parkinson's disease  Status post hip hemiarthroplasty  No significant past surgical history        Medication list         MEDICATIONS  (STANDING):  ascorbic acid 1000 milliGRAM(s) Oral daily  carbidopa/levodopa  25/100 1 Tablet(s) Oral three times a day  dextrose 5%. 1000 milliLiter(s) (50 mL/Hr) IV Continuous <Continuous>  enoxaparin Injectable 30 milliGRAM(s) SubCutaneous daily  famotidine    Tablet 20 milliGRAM(s) Oral daily  hydroxychloroquine   Oral   hydroxychloroquine 400 milliGRAM(s) Oral every 24 hours  polyethylene glycol 3350 17 Gram(s) Oral daily  potassium chloride   Powder 40 milliEquivalent(s) Oral once  QUEtiapine 25 milliGRAM(s) Oral at bedtime  rasagiline Tablet 1 milliGRAM(s) Oral daily  vancomycin  IVPB      vancomycin  IVPB 750 milliGRAM(s) IV Intermittent every 24 hours  venlafaxine 37.5 milliGRAM(s) Oral two times a day with meals  zinc sulfate 220 milliGRAM(s) Oral daily    MEDICATIONS  (PRN):  acetaminophen   Tablet .. 650 milliGRAM(s) Oral every 6 hours PRN Temp greater or equal to 38C (100.4F), Mild Pain (1 - 3)  acetaminophen  Suppository .. 650 milliGRAM(s) Rectal every 6 hours PRN Temp greater or equal to 38C (100.4F), Mild Pain (1 - 3)  morphine  - Injectable 2 milliGRAM(s) IV Push every 4 hours PRN Severe Pain (7 - 10)         Vitals log        ICU Vital Signs Last 24 Hrs  T(C): 37.7 (12 Apr 2020 04:00), Max: 39.5 (12 Apr 2020 00:45)  T(F): 99.8 (12 Apr 2020 04:00), Max: 103.1 (12 Apr 2020 00:45)  HR: 83 (12 Apr 2020 04:00) (83 - 112)  BP: 102/56 (12 Apr 2020 04:00) (98/48 - 119/63)  BP(mean): --  ABP: --  ABP(mean): --  RR: 24 (12 Apr 2020 04:00) (24 - 30)  SpO2: 87% (12 Apr 2020 04:00) (87% - 98%)           Input and Output:  I&O's Detail      Lab Data                        10.1   14.17 )-----------( 175      ( 11 Apr 2020 06:16 )             31.8     04-11    143  |  109<H>  |  40<H>  ----------------------------<  129<H>  3.5   |  25  |  1.25    Ca    7.9<L>      11 Apr 2020 06:16    TPro  7.2  /  Alb  3.1<L>  /  TBili  0.5  /  DBili  x   /  AST  161<H>  /  ALT  34  /  AlkPhos  54  04-10    ABG - ( 10 Apr 2020 12:53 )  pH, Arterial: x     pH, Blood: 7.44  /  pCO2: 36    /  pO2: 85    / HCO3: 25    / Base Excess: -0.2  /  SaO2: 96                CARDIAC MARKERS ( 10 Apr 2020 11:16 )  .111 ng/mL / x     / x     / x     / x            Review of Systems	      Objective     Physical Examination    heart s1s2  lung dec BS      Pertinent Lab findings & Imaging      [place]:  NO   Adequate UO     I&O's Detail           Discussed with:     Cultures:	        Radiology

## 2020-04-12 NOTE — DIETITIAN INITIAL EVALUATION ADULT. - PERTINENT LABORATORY DATA
4/12 - WBC 10.6H, H/H 10.6L/33.1L, K+ 3.4L, BUN 35H, Glucose 139H, Ca++ 8.1L, GFR 53L, Phosporus 2.3L; labs 4/10 - Alb 3.1L

## 2020-04-12 NOTE — PROGRESS NOTE ADULT - SUBJECTIVE AND OBJECTIVE BOX
Patient is a 79y old  Female who presents with a chief complaint of fever and shob (12 Apr 2020 06:14)      INTERVAL HPI/OVERNIGHT EVENTS: Fever overnight.     MEDICATIONS  (STANDING):  ascorbic acid 1000 milliGRAM(s) Oral daily  carbidopa/levodopa  25/100 1 Tablet(s) Oral three times a day  dextrose 5%. 1000 milliLiter(s) (50 mL/Hr) IV Continuous <Continuous>  enoxaparin Injectable 30 milliGRAM(s) SubCutaneous daily  famotidine    Tablet 20 milliGRAM(s) Oral daily  hydroxychloroquine   Oral   hydroxychloroquine 400 milliGRAM(s) Oral every 24 hours  polyethylene glycol 3350 17 Gram(s) Oral daily  QUEtiapine 25 milliGRAM(s) Oral at bedtime  rasagiline Tablet 1 milliGRAM(s) Oral daily  vancomycin  IVPB      vancomycin  IVPB 750 milliGRAM(s) IV Intermittent every 24 hours  venlafaxine 37.5 milliGRAM(s) Oral two times a day with meals  zinc sulfate 220 milliGRAM(s) Oral daily    MEDICATIONS  (PRN):  acetaminophen   Tablet .. 650 milliGRAM(s) Oral every 6 hours PRN Temp greater or equal to 38C (100.4F), Mild Pain (1 - 3)  acetaminophen  Suppository .. 650 milliGRAM(s) Rectal every 6 hours PRN Temp greater or equal to 38C (100.4F), Mild Pain (1 - 3)  morphine  - Injectable 2 milliGRAM(s) IV Push every 4 hours PRN Severe Pain (7 - 10)      Allergies    No Known Allergies    Intolerances        REVIEW OF SYSTEMS:  Unable to obtain due to patient condition.     Vital Signs Last 24 Hrs  T(C): 37.7 (12 Apr 2020 04:00), Max: 39.5 (12 Apr 2020 00:45)  T(F): 99.8 (12 Apr 2020 04:00), Max: 103.1 (12 Apr 2020 00:45)  HR: 120 (12 Apr 2020 08:21) (83 - 120)  BP: 126/82 (12 Apr 2020 08:21) (98/48 - 126/82)  BP(mean): --  RR: 23 (12 Apr 2020 08:21) (23 - 27)  SpO2: 88% (12 Apr 2020 08:21) (87% - 93%)    PHYSICAL EXAM:  GENERAL: Breathing labored.   HEAD:  Atraumatic, Normocephalic  EYES: PERRLA, conjunctiva and sclera clear  ENMT: dry mucous membranes, No lesions; No tonsillar erythema, exudates, or enlargement  NECK: Supple  NERVOUS SYSTEM:  unresponsive  CHEST/LUNG: Bilateral rales. No rhonchi, wheezing, or rubs  HEART: Regular rate and rhythm; No murmurs, rubs, or gallops  ABDOMEN: Soft, Nontender, Nondistended; Bowel sounds present  EXTREMITIES:  2+ Peripheral Pulses, No clubbing, cyanosis, or edema  LYMPH: No lymphadenopathy noted  SKIN: No rashes or lesions    LABS:                        10.6   10.66 )-----------( 177      ( 12 Apr 2020 07:02 )             33.1     12 Apr 2020 07:02    140    |  107    |  35     ----------------------------<  139    3.4     |  27     |  0.99     Ca    8.1        12 Apr 2020 07:02  Phos  2.3       12 Apr 2020 07:02  Mg     2.4       12 Apr 2020 07:02      Consultant(s) Notes Reviewed:  Dr Valles    Advanced Directives: [X ] DNR  [ ] No feeding tube  [ ] MOLST in chart  [ ] MOLST completed today  [ ] Unknown

## 2020-04-12 NOTE — PROGRESS NOTE ADULT - ASSESSMENT
78 y/o f with past hx of parkinsonism, dementia, peripheral neuropathy, gerd, chronic constipation who was brought in to ED from group home for complaints of high grade fever and SHOB with RA hypoxia of 60. The patient was found ot have fever of 101 at group home where there is outbreak of covid also. Admitted for COVID PNA.     DNR/DNI

## 2020-04-13 VITALS — HEART RATE: 99 BPM | OXYGEN SATURATION: 95 % | RESPIRATION RATE: 22 BRPM

## 2020-04-13 DIAGNOSIS — Z29.9 ENCOUNTER FOR PROPHYLACTIC MEASURES, UNSPECIFIED: ICD-10-CM

## 2020-04-13 LAB
-  AMPICILLIN/SULBACTAM: SIGNIFICANT CHANGE UP
-  CEFAZOLIN: SIGNIFICANT CHANGE UP
-  CLINDAMYCIN: SIGNIFICANT CHANGE UP
-  DAPTOMYCIN: SIGNIFICANT CHANGE UP
-  ERYTHROMYCIN: SIGNIFICANT CHANGE UP
-  GENTAMICIN: SIGNIFICANT CHANGE UP
-  LINEZOLID: SIGNIFICANT CHANGE UP
-  OXACILLIN: SIGNIFICANT CHANGE UP
-  PENICILLIN: SIGNIFICANT CHANGE UP
-  RIFAMPIN: SIGNIFICANT CHANGE UP
-  TETRACYCLINE: SIGNIFICANT CHANGE UP
-  TRIMETHOPRIM/SULFAMETHOXAZOLE: SIGNIFICANT CHANGE UP
-  VANCOMYCIN: SIGNIFICANT CHANGE UP
ANION GAP SERPL CALC-SCNC: 6 MMOL/L — SIGNIFICANT CHANGE UP (ref 5–17)
BASOPHILS # BLD AUTO: 0.02 K/UL — SIGNIFICANT CHANGE UP (ref 0–0.2)
BASOPHILS NFR BLD AUTO: 0.2 % — SIGNIFICANT CHANGE UP (ref 0–2)
BUN SERPL-MCNC: 25 MG/DL — HIGH (ref 7–23)
CALCIUM SERPL-MCNC: 8.1 MG/DL — LOW (ref 8.4–10.5)
CHLORIDE SERPL-SCNC: 104 MMOL/L — SIGNIFICANT CHANGE UP (ref 96–108)
CO2 SERPL-SCNC: 26 MMOL/L — SIGNIFICANT CHANGE UP (ref 22–31)
CREAT SERPL-MCNC: 0.82 MG/DL — SIGNIFICANT CHANGE UP (ref 0.5–1.3)
CULTURE RESULTS: SIGNIFICANT CHANGE UP
CULTURE RESULTS: SIGNIFICANT CHANGE UP
EOSINOPHIL # BLD AUTO: 0 K/UL — SIGNIFICANT CHANGE UP (ref 0–0.5)
EOSINOPHIL NFR BLD AUTO: 0 % — SIGNIFICANT CHANGE UP (ref 0–6)
GLUCOSE SERPL-MCNC: 92 MG/DL — SIGNIFICANT CHANGE UP (ref 70–99)
HCT VFR BLD CALC: 36.8 % — SIGNIFICANT CHANGE UP (ref 34.5–45)
HGB BLD-MCNC: 11.6 G/DL — SIGNIFICANT CHANGE UP (ref 11.5–15.5)
IMM GRANULOCYTES NFR BLD AUTO: 0.6 % — SIGNIFICANT CHANGE UP (ref 0–1.5)
LYMPHOCYTES # BLD AUTO: 0.41 K/UL — LOW (ref 1–3.3)
LYMPHOCYTES # BLD AUTO: 4.1 % — LOW (ref 13–44)
MCHC RBC-ENTMCNC: 28.9 PG — SIGNIFICANT CHANGE UP (ref 27–34)
MCHC RBC-ENTMCNC: 31.5 GM/DL — LOW (ref 32–36)
MCV RBC AUTO: 91.8 FL — SIGNIFICANT CHANGE UP (ref 80–100)
METHOD TYPE: SIGNIFICANT CHANGE UP
MONOCYTES # BLD AUTO: 0.46 K/UL — SIGNIFICANT CHANGE UP (ref 0–0.9)
MONOCYTES NFR BLD AUTO: 4.6 % — SIGNIFICANT CHANGE UP (ref 2–14)
NEUTROPHILS # BLD AUTO: 9.15 K/UL — HIGH (ref 1.8–7.4)
NEUTROPHILS NFR BLD AUTO: 90.5 % — HIGH (ref 43–77)
NRBC # BLD: 0 /100 WBCS — SIGNIFICANT CHANGE UP (ref 0–0)
ORGANISM # SPEC MICROSCOPIC CNT: SIGNIFICANT CHANGE UP
PLATELET # BLD AUTO: 196 K/UL — SIGNIFICANT CHANGE UP (ref 150–400)
POTASSIUM SERPL-MCNC: 4.1 MMOL/L — SIGNIFICANT CHANGE UP (ref 3.5–5.3)
POTASSIUM SERPL-SCNC: 4.1 MMOL/L — SIGNIFICANT CHANGE UP (ref 3.5–5.3)
RBC # BLD: 4.01 M/UL — SIGNIFICANT CHANGE UP (ref 3.8–5.2)
RBC # FLD: 13.6 % — SIGNIFICANT CHANGE UP (ref 10.3–14.5)
SODIUM SERPL-SCNC: 136 MMOL/L — SIGNIFICANT CHANGE UP (ref 135–145)
SPECIMEN SOURCE: SIGNIFICANT CHANGE UP
SPECIMEN SOURCE: SIGNIFICANT CHANGE UP
VANCOMYCIN TROUGH SERPL-MCNC: 5.4 UG/ML — LOW (ref 10–20)
WBC # BLD: 10.1 K/UL — SIGNIFICANT CHANGE UP (ref 3.8–10.5)
WBC # FLD AUTO: 10.1 K/UL — SIGNIFICANT CHANGE UP (ref 3.8–10.5)

## 2020-04-13 RX ORDER — ENOXAPARIN SODIUM 100 MG/ML
40 INJECTION SUBCUTANEOUS DAILY
Refills: 0 | Status: DISCONTINUED | OUTPATIENT
Start: 2020-04-13 | End: 2020-04-13

## 2020-04-13 RX ADMIN — Medication 250 MILLIGRAM(S): at 12:44

## 2020-04-13 RX ADMIN — ENOXAPARIN SODIUM 40 MILLIGRAM(S): 100 INJECTION SUBCUTANEOUS at 12:38

## 2020-04-13 RX ADMIN — SODIUM CHLORIDE 50 MILLILITER(S): 9 INJECTION, SOLUTION INTRAVENOUS at 17:00

## 2020-04-13 RX ADMIN — SODIUM CHLORIDE 50 MILLILITER(S): 9 INJECTION, SOLUTION INTRAVENOUS at 05:44

## 2020-04-13 NOTE — PROGRESS NOTE ADULT - PROBLEM SELECTOR PLAN 1
seen and examined - remains on NRB - with mild resp distress -   fever noted -   blood cx noted  on Vanco now, final Id of cx pending  oral and skin care  assist with ADL  high risk for aspiration  labs and imaging reviewed  covid - supportive measures  o2 support   keep sat > 90 pct  Monitor VS and HD and Sat  prognosis Guarded  pt is DNR.
swallow eval noted  blood cx noted  on Vanco now, final Id of cx pending  oral and skin care  assist with ADL  high risk for aspiration  labs and imaging reviewed  covid - supportive measures  o2 support   keep sat > 90 pct  Monitor VS and HD and Sat  prognosis Guarded  pt is DNR
swallow eval noted  blood cx noted  on Vanco now, final Id of cx pending  oral and skin care  assist with ADL  high risk for aspiration  labs and imaging reviewed  covid - supportive measures  o2 support   keep sat > 90 pct  Monitor VS and HD and Sat  prognosis Guarded  pt is DNR.
-continue supplemental oxygen, on non-rebreather with SpO2 ranging 93- 94%
On NRB. Will continue to monitor O2 sats.   Continue hydroxychloroquine.
On NRB. Will continue to monitor O2 sats.   D/c azithromycin as patient on multiple QTc prolonging medications and continue hydroxychloroquine.

## 2020-04-13 NOTE — PROGRESS NOTE ADULT - PROBLEM SELECTOR PROBLEM 1
COVID-19 virus infection
Acute respiratory failure with hypoxia

## 2020-04-13 NOTE — PROGRESS NOTE ADULT - ASSESSMENT
78 y/o f. resident of [facility], non-verbal at baseline,  with past hx of parkinsonism, dementia, peripheral neuropathy, gerd, chronic constipation who was brought in to ED from group [facility] high grade fever and SHOB with room air hypoxia of 60. Patient admitted with acute respiratory failure with hypoxia 2/2 covid-19 infection and MRSA bacteremia.

## 2020-04-13 NOTE — CONSULT NOTE ADULT - SUBJECTIVE AND OBJECTIVE BOX
HPI:  78YO F with PMH parkinsonism, dementia, peripheral neuropathy resident of [facility] admitted with fever Tmax 101 with acute hypoxic respiratory failure sec COVID 19 pneumonia. Also with MRSA bacteremia. DNR DNI. Pt unable to provide history. Lethargic.    Infectious Disease consult was called to evaluate pt and for antibiotic management.    Past Medical & Surgical Hx:  PAST MEDICAL & SURGICAL HISTORY:  Scoliosis  Neuropathy  Parkinson's disease  Status post hip hemiarthroplasty      Social History--  EtOH: denies   Tobacco: denies   Drug Use: denies     FAMILY HISTORY:  Family history of age of death: mother  of old age  Family history of Parkinson disease      Allergies  No Known Allergies    Intolerances  NONE      Home Medications:  Colace 100 mg oral capsule: 1 cap(s) orally 3 times a day (10 Apr 2020 13:13)  doxycycline hyclate 100 mg oral tablet: 1 tab(s) orally 2 times a day (10 Apr 2020 13:13)  MiraLax oral powder for reconstitution: 17 gram(s) orally once a day (10 Apr 2020 13:13)  PriLOSEC 20 mg oral delayed release capsule: 1 cap(s) orally once a day (10 Apr 2020 13:13)  Senna 8.6 mg oral tablet: 2 tab(s) orally once a day (at bedtime) (10 Apr 2020 13:13)  Triamcinolone Acetonide in Absorbase 0.05% topical ointment: Apply topically to affected area once a day (10 Apr 2020 13:13)  Tylenol 500 mg oral tablet: 2 tab(s) orally 2 times a day (10 Apr 2020 13:13)  Vitamin B1 100 mg oral tablet: 1 tab(s) orally once a day (10 Apr 2020 13:13)  Vitamin C 1000 mg oral tablet: 1 tab(s) orally once a day (10 Apr 2020 13:13)  zinc sulfate 220 mg oral tablet: 1 tab(s) orally once a day (10 Apr 2020 13:13)      Current Inpatient Medications :    ANTIBIOTICS:   hydroxychloroquine   Oral   hydroxychloroquine 400 milliGRAM(s) Oral every 24 hours  vancomycin  IVPB      vancomycin  IVPB 750 milliGRAM(s) IV Intermittent every 24 hours      OTHER RELEVANT MEDICATIONS :  acetaminophen   Tablet .. 650 milliGRAM(s) Oral every 6 hours PRN  acetaminophen  Suppository .. 650 milliGRAM(s) Rectal every 6 hours PRN  ascorbic acid 1000 milliGRAM(s) Oral daily  carbidopa/levodopa  25/100 1 Tablet(s) Oral three times a day  dextrose 5%. 1000 milliLiter(s) IV Continuous <Continuous>  enoxaparin Injectable 40 milliGRAM(s) SubCutaneous daily  famotidine    Tablet 20 milliGRAM(s) Oral daily  polyethylene glycol 3350 17 Gram(s) Oral daily  QUEtiapine 25 milliGRAM(s) Oral at bedtime  rasagiline Tablet 1 milliGRAM(s) Oral daily  venlafaxine 37.5 milliGRAM(s) Oral two times a day with meals  zinc sulfate 220 milliGRAM(s) Oral daily      ROS:  Unable to obtain due to : pt's condition    Physical Exam:  Vital Signs Last 24 Hrs  T(C): 36.9 (2020 15:15), Max: 37.6 (2020 00:20)  T(F): 98.5 (2020 15:15), Max: 99.7 (2020 00:20)  HR: 102 (2020 15:15) (102 - 116)  BP: 132/71 (2020 15:15) (117/56 - 132/71)  RR: 20 (2020 15:15) (19 - 22)  SpO2: 95% (2020 15:15) (93% - 95%)      General: Lethargic On NRB  Eyes: sclera anicteric, pupils equal and reactive to light  ENMT: buccal mucosa moist, pharynx not injected  Neck: supple, trachea midline  Lungs: Decreased no wheeze/rhonchi  Cardiovascular: regular rate and rhythm, S1 S2  Abdomen: soft, nontender, no organomegaly present, bowel sounds normal  Neurological:  Lethargic  Skin:no increased ecchymosis/petechiae/purpura  Lymph Nodes: no palpable cervical/supraclavicular lymph nodes enlargements  Extremities: no cyanosis/clubbing/edema    Labs:                         11.6   10.10 )-----------( 196      ( 2020 08:00 )             36.8   04-13    136  |  104  |  25<H>  ----------------------------<  92  4.1   |  26  |  0.82    Ca    8.1<L>      2020 08:00  Phos  2.3     04-12  Mg     2.4     04-12    RECENT CULTURES:  Culture - Blood (04.10.20 @ 16:44)    -  Gentamicin: S <=1 Should not be used as monotherapy    -  Linezolid: S 2    -  Oxacillin: R >2    -  Penicillin: R >8    -  RIF- Rifampin: S <=1 Should not be used as monotherapy    -  Tetra/Doxy: S <=1    -  Trimethoprim/Sulfamethoxazole: S <=0.5/9.5    -  Vancomycin: S 1    -  Methicillin resistant Staphylococcus aureus (MRSA): Detec    Gram Stain:   Growth in aerobic and anaerobic bottles: Gram Positive Cocci in Clusters    -  Ampicillin/Sulbactam: R <=8/4    -  Cefazolin: R <=4    -  Clindamycin: S 0.5    -  Daptomycin: S 0.5    -  Erythromycin: R >4    Specimen Source: .Blood Blood-Peripheral    Organism: Blood Culture PCR    Organism: Methicillin resistant Staphylococcus aureus    Culture Results:   Growth in aerobic and anaerobic bottles: Methicillin resistant  Staphylococcus aureus  "Due to technical problems, Proteus sp. will Not be reported as part of  the BCID panel until further notice" ***Blood Panel PCR results on this  specimen are available  approximately 3 hours after the Gram stain result.***  Gram stain, PCR, and/or culture results may not always  correspond due to difference in methodologies.  ************************************************************  This PCR assay was performed using [employer]  The following targets are tested for: Enterococcus,  vancomycin resistant enterococci, Listeria monocytogenes,  coagulase negative staphylococci, S. aureus,  methicillin resistant S. aureus, Streptococcus agalactiae  (Group B), S. pneumoniae, S. pyogenes (Group A),  Acinetobacter baumannii, Enterobacter cloacae, E. coli,  Klebsiella oxytoca, K. pneumoniae, Proteus sp.,  Serratia marcescens, Haemophilus influenzae,  Neisseria meningitidis, Pseudomonas aeruginosa, Candida  albicans, C. glabrata, C krusei, C parapsilosis,  C. tropicalis and the KPC resistance gene.    Organism Identification: Blood Culture PCR  Methicillin resistant Staphylococcus aureus    Method Type: PCR    Method Type: CARMEN    RADIOLOGY & ADDITIONAL STUDIES:    EXAM:  XR CHEST PORTABLE URGENT 1V                                  PROCEDURE DATE:  04/10/2020          INTERPRETATION:  Indication: Shortness of breath, cough, fever.    Technique: Single portable view of the chest.    Comparison: 2018    Findings: The cardiac silhouette is normal in size. There are hazy airspace opacities in the left lung with denser patchy consolidation present in the right upper lobe. There are no pleural effusions. The hilar mediastinal structures appear unremarkable.    Impression: Bilateral airspace opacities as described above, concerning for viral pneumonia such as COVID.         Assessment :   78YO F with PMH parkinsonism, dementia, peripheral neuropathy resident of [facility] admitted with fever Tmax 101 with acute hypoxic respiratory failure sec COVID 19 pneumonia. Also with MRSA bacteremia. DNR DNI. Pt unable to provide history. Lethargic.    MRSA likely sec SSTI vs superimposed MRSA pneumonia.      Plan :   Cont Vancomycin 750mg IV daily  Dc Hydroxychloroquine as pt is too lethargic to swallow pills  COVID-19--critical period for decompensation  (7-14 days post symptom onset), avoid aerosolizing procedures, NSAIDs   -monitor respiratory status closely ( route of 02 and saturation) and titrate when able  -isolation precautions per protocol  -avoid excessive blood draws, blood cultures and frequent CXRs  -monitor biomarkers- CBC w diff  for NLRNLR <3 low vs >5 high) Ferritin (lower risk <450 vs >850) CRP (low risk <2 and higher risk >6) and LDH, D Dimer, procalcitonin  -therapies remains investigational-- including Hydroxychloroquine  -antibiotics only if there is concern for a bacterial process  -Steroids short course ( 5 days)  --for hypoxia/ARDS /shock  DNRDNI  Prognosis very poor  Should be hospice    D/W Hospitalist      Continue with present regime .  Approptiate use of antibiotics and adverse effects reviewed.      I have discussed the above plan of care with patient/family in detail. They expressed understanding of the treatment plan . Risks, benefits and alternatives discussed in detail. I have asked if they have any questions or concerns and appropriately addressed them to the best of my ability .      > 45 minutes spent in direct patient care reviewing  the notes, lab data/ imaging , discussion with multidisciplinary team. All questions were addressed and answered to the best of my capacity .    Thank you for allowing me to participate in the care of your patient .      Mustapha Fiore MD  Infectious Disease  [phone]

## 2020-04-13 NOTE — PROGRESS NOTE ADULT - PROBLEM SELECTOR PLAN 2
-continue with hydroxychloroquine taper, monitor oxygen  -afebrile x 24 hours, continue to monitor  -continue with prn Tylenol

## 2020-04-13 NOTE — PROGRESS NOTE ADULT - PROBLEM SELECTOR PLAN 3
- ID consult pending  - patient was started on vanco IV, trough this morning was 5.4, trough was collected prior to third dose. Antibiotic day 3 today

## 2020-04-13 NOTE — PROGRESS NOTE ADULT - REASON FOR ADMISSION
fever and shortness of breath
fever and shob
fever and sob
fever and shob

## 2020-04-13 NOTE — PROGRESS NOTE ADULT - SUBJECTIVE AND OBJECTIVE BOX
Date/Time Patient Seen:  		  Referring MD:   Data Reviewed	       Patient is a 79y old  Female who presents with a chief complaint of fever and shob (12 Apr 2020 12:39)      Subjective/HPI     PAST MEDICAL & SURGICAL HISTORY:  Scoliosis  Neuropathy  Parkinson's disease  Status post hip hemiarthroplasty  No significant past surgical history        Medication list         MEDICATIONS  (STANDING):  ascorbic acid 1000 milliGRAM(s) Oral daily  carbidopa/levodopa  25/100 1 Tablet(s) Oral three times a day  dextrose 5%. 1000 milliLiter(s) (50 mL/Hr) IV Continuous <Continuous>  enoxaparin Injectable 30 milliGRAM(s) SubCutaneous daily  famotidine    Tablet 20 milliGRAM(s) Oral daily  hydroxychloroquine   Oral   hydroxychloroquine 400 milliGRAM(s) Oral every 24 hours  polyethylene glycol 3350 17 Gram(s) Oral daily  QUEtiapine 25 milliGRAM(s) Oral at bedtime  rasagiline Tablet 1 milliGRAM(s) Oral daily  vancomycin  IVPB      vancomycin  IVPB 750 milliGRAM(s) IV Intermittent every 24 hours  venlafaxine 37.5 milliGRAM(s) Oral two times a day with meals  zinc sulfate 220 milliGRAM(s) Oral daily    MEDICATIONS  (PRN):  acetaminophen   Tablet .. 650 milliGRAM(s) Oral every 6 hours PRN Temp greater or equal to 38C (100.4F), Mild Pain (1 - 3)  acetaminophen  Suppository .. 650 milliGRAM(s) Rectal every 6 hours PRN Temp greater or equal to 38C (100.4F), Mild Pain (1 - 3)  morphine  - Injectable 2 milliGRAM(s) IV Push every 4 hours PRN Severe Pain (7 - 10)         Vitals log        ICU Vital Signs Last 24 Hrs  T(C): 37.6 (13 Apr 2020 00:20), Max: 37.6 (13 Apr 2020 00:20)  T(F): 99.7 (13 Apr 2020 00:20), Max: 99.7 (13 Apr 2020 00:20)  HR: 116 (13 Apr 2020 00:20) (114 - 120)  BP: 117/56 (13 Apr 2020 00:20) (117/56 - 129/57)  BP(mean): --  ABP: --  ABP(mean): --  RR: 22 (13 Apr 2020 00:20) (21 - 23)  SpO2: 93% (13 Apr 2020 00:20) (88% - 98%)           Input and Output:  I&O's Detail      Lab Data                        10.6   10.66 )-----------( 177      ( 12 Apr 2020 07:02 )             33.1     04-12    140  |  107  |  35<H>  ----------------------------<  139<H>  3.4<L>   |  27  |  0.99    Ca    8.1<L>      12 Apr 2020 07:02  Phos  2.3     04-12  Mg     2.4     04-12              Review of Systems	      Objective     Physical Examination    heart s1s2  lung dec BS  on NRB      Pertinent Lab findings & Imaging      Roly:  NO   Adequate UO     I&O's Detail           Discussed with:     Cultures:	        Radiology

## 2020-04-13 NOTE — PROGRESS NOTE ADULT - SUBJECTIVE AND OBJECTIVE BOX
Medicine Progress Note    Patient is a 79y old  Female who presented with a chief complaint of fever and shortness of breath (13 Apr 2020 14:18).     SUBJECTIVE / OVERNIGHT EVENTS: Patient is non-verbal, febrile O/N around midnight to 103.1. No other reports from nursing.     ADDITIONAL REVIEW OF SYSTEMS:    MEDICATIONS  (STANDING):  ascorbic acid 1000 milliGRAM(s) Oral daily  carbidopa/levodopa  25/100 1 Tablet(s) Oral three times a day  dextrose 5%. 1000 milliLiter(s) (50 mL/Hr) IV Continuous <Continuous>  enoxaparin Injectable 40 milliGRAM(s) SubCutaneous daily  famotidine    Tablet 20 milliGRAM(s) Oral daily  hydroxychloroquine   Oral   hydroxychloroquine 400 milliGRAM(s) Oral every 24 hours  polyethylene glycol 3350 17 Gram(s) Oral daily  QUEtiapine 25 milliGRAM(s) Oral at bedtime  rasagiline Tablet 1 milliGRAM(s) Oral daily  vancomycin  IVPB      vancomycin  IVPB 750 milliGRAM(s) IV Intermittent every 24 hours  venlafaxine 37.5 milliGRAM(s) Oral two times a day with meals  zinc sulfate 220 milliGRAM(s) Oral daily    MEDICATIONS  (PRN):  acetaminophen   Tablet .. 650 milliGRAM(s) Oral every 6 hours PRN Temp greater or equal to 38C (100.4F), Mild Pain (1 - 3)  acetaminophen  Suppository .. 650 milliGRAM(s) Rectal every 6 hours PRN Temp greater or equal to 38C (100.4F), Mild Pain (1 - 3)  morphine  - Injectable 2 milliGRAM(s) IV Push every 4 hours PRN Severe Pain (7 - 10)    PHYSICAL EXAM:  Vital Signs Last 24 Hrs  T(C): 36.8 (13 Apr 2020 08:28), Max: 37.6 (13 Apr 2020 00:20)  T(F): 98.3 (13 Apr 2020 08:28), Max: 99.7 (13 Apr 2020 00:20)  HR: 106 (13 Apr 2020 08:28) (106 - 116)  BP: 128/71 (13 Apr 2020 08:28) (117/56 - 129/57)  RR: 19 (13 Apr 2020 08:28) (19 - 22)  SpO2: 95% (13 Apr 2020 08:28) (93% - 98%)    CONSTITUTIONAL: NAD, laying comfortably in bed.  ENMT: Moist oral mucosa  RESPIRATORY: On Non-rebreather with Normal respiratory effort; minimal rales anteriorly, bilaterally  CARDIOVASCULAR: Regular rate and rhythm, normal S1 and S2, no murmur/rub/gallop; No lower extremity edema; Peripheral pulses are 2+ bilaterally  ABDOMEN: Nontender to palpation, normoactive bowel sounds, no rebound/guarding; No hepatosplenomegaly  PSYCH: Unable to assess due to baseline mental status  NEUROLOGY: Awake, alert.  SKIN: No rashes; no palpable lesions    LABS:                        11.6   10.10 )-----------( 196      ( 13 Apr 2020 08:00 )             36.8     04-13    136  |  104  |  25<H>  ----------------------------<  92  4.1   |  26  |  0.82    Ca    8.1<L>      13 Apr 2020 08:00  Phos  2.3     04-12  Mg     2.4     04-12      Culture - Blood (collected 10 Apr 2020 16:44)  Source: .Blood Blood-Peripheral  Gram Stain (11 Apr 2020 17:45):    Growth in aerobic and anaerobic bottles: Gram Positive Cocci in Clusters  Final Report (13 Apr 2020 07:47):    Growth in aerobic and anaerobic bottles: Methicillin resistant    Staphylococcus aureus    "Due to technical problems, Proteus sp. will Not be reported as part of    the BCID panel until further notice" ***Blood Panel PCR results on this    specimen are available    approximately 3 hours after the Gram stain result.***    Gram stain, PCR, and/or culture results may not always    correspond due to difference in methodologies.    ************************************************************    This PCR assay was performed using [employer].    The following targets are tested for: Enterococcus,    vancomycin resistant enterococci, Listeria monocytogenes,    coagulase negative staphylococci, S. aureus,    methicillin resistant S. aureus, Streptococcus agalactiae    (Group B), S. pneumoniae, S. pyogenes (Group A),    Acinetobacter baumannii, Enterobacter cloacae, E. coli,    Klebsiella oxytoca, K. pneumoniae, Proteus sp.,    Serratia marcescens, Haemophilus influenzae,    Neisseria meningitidis, Pseudomonas aeruginosa, Candida    albicans, C. glabrata, C krusei, C parapsilosis,    C. tropicalis and the KPC resistance gene.  Organism: Blood Culture PCR  Methicillin resistant Staphylococcus aureus (13 Apr 2020 07:47)  Organism: Methicillin resistant Staphylococcus aureus (13 Apr 2020 07:47)  Organism: Blood Culture PCR (13 Apr 2020 07:47)    Culture - Blood (collected 10 Apr 2020 16:44)  Source: .Blood Blood-Peripheral  Gram Stain (11 Apr 2020 17:47):    Growth in anaerobic bottle: Gram Positive Cocci in Clusters  Final Report (13 Apr 2020 07:48):    Growth in anaerobic bottle: Methicillin resistant Staphylococcus aureus    See previous culture [id number]    Culture - Urine (collected 10 Apr 2020 16:31)  Source: .Urine Clean Catch (Midstream)  Final Report (11 Apr 2020 11:32):    >=3 organisms. Probable collection contamination.      COVID-19 PCR: Detected (10 Apr 2020 14:57)      RADIOLOGY & ADDITIONAL TESTS:  Imaging from Last 24 Hours: None    Electrocardiogram/QTc Interval: QTc 456    COORDINATION OF CARE:  Care Discussed with Consultants/Other Providers: Nursing, Case Management.

## 2020-04-13 NOTE — PROVIDER CONTACT NOTE (CRITICAL VALUE NOTIFICATION) - SITUATION
blood culture #1:  anaerobic and aerobic bottles +growth , MRSA  blood cultures #2:: growth in anaerobic MRSA; staph.

## 2020-06-02 PROCEDURE — 80202 ASSAY OF VANCOMYCIN: CPT

## 2020-06-02 PROCEDURE — 83615 LACTATE (LD) (LDH) ENZYME: CPT

## 2020-06-02 PROCEDURE — 82803 BLOOD GASES ANY COMBINATION: CPT

## 2020-06-02 PROCEDURE — 87150 DNA/RNA AMPLIFIED PROBE: CPT

## 2020-06-02 PROCEDURE — 83735 ASSAY OF MAGNESIUM: CPT

## 2020-06-02 PROCEDURE — 85610 PROTHROMBIN TIME: CPT

## 2020-06-02 PROCEDURE — 36415 COLL VENOUS BLD VENIPUNCTURE: CPT

## 2020-06-02 PROCEDURE — 85027 COMPLETE CBC AUTOMATED: CPT

## 2020-06-02 PROCEDURE — 82728 ASSAY OF FERRITIN: CPT

## 2020-06-02 PROCEDURE — 87635 SARS-COV-2 COVID-19 AMP PRB: CPT

## 2020-06-02 PROCEDURE — 80048 BASIC METABOLIC PNL TOTAL CA: CPT

## 2020-06-02 PROCEDURE — 85379 FIBRIN DEGRADATION QUANT: CPT

## 2020-06-02 PROCEDURE — 86140 C-REACTIVE PROTEIN: CPT

## 2020-06-02 PROCEDURE — 81001 URINALYSIS AUTO W/SCOPE: CPT

## 2020-06-02 PROCEDURE — 80053 COMPREHEN METABOLIC PANEL: CPT

## 2020-06-02 PROCEDURE — 85730 THROMBOPLASTIN TIME PARTIAL: CPT

## 2020-06-02 PROCEDURE — 99285 EMERGENCY DEPT VISIT HI MDM: CPT | Mod: 25

## 2020-06-02 PROCEDURE — 83880 ASSAY OF NATRIURETIC PEPTIDE: CPT

## 2020-06-02 PROCEDURE — 96365 THER/PROPH/DIAG IV INF INIT: CPT

## 2020-06-02 PROCEDURE — 85384 FIBRINOGEN ACTIVITY: CPT

## 2020-06-02 PROCEDURE — 84484 ASSAY OF TROPONIN QUANT: CPT

## 2020-06-02 PROCEDURE — 87186 SC STD MICRODIL/AGAR DIL: CPT

## 2020-06-02 PROCEDURE — 87086 URINE CULTURE/COLONY COUNT: CPT

## 2020-06-02 PROCEDURE — 71045 X-RAY EXAM CHEST 1 VIEW: CPT

## 2020-06-02 PROCEDURE — 84100 ASSAY OF PHOSPHORUS: CPT

## 2020-06-02 PROCEDURE — 83605 ASSAY OF LACTIC ACID: CPT

## 2020-06-02 PROCEDURE — 84145 PROCALCITONIN (PCT): CPT

## 2020-06-02 PROCEDURE — 87040 BLOOD CULTURE FOR BACTERIA: CPT

## 2020-06-02 PROCEDURE — 93005 ELECTROCARDIOGRAM TRACING: CPT

## 2020-07-08 NOTE — ED PROCEDURE NOTE - CPROC ED MALLAMPATI SCORE1
When Should The Patient Follow-Up For Their Next Full-Body Skin Exam?: 6 Months
Quality 137: Melanoma: Continuity Of Care - Recall System: Patient information entered into a recall system that includes: target date for the next exam specified AND a process to follow up with patients regarding missed or unscheduled appointments
Detail Level: Simple
Detail Level: Generalized
Class 2: Soft palate, uvula, fauces visible.

## 2020-10-05 NOTE — ED PROVIDER NOTE - PROGRESS NOTE DETAILS
Ventricular Rate : 86  Atrial Rate : 86  P-R Interval : 176  QRS Duration : 136  Q-T Interval : 434  QTC Calculation(Bazett) : 519  P Axis : 80  R Axis : 268  T Axis : 63  Diagnosis : Normal sinus rhythm  Right atrial enlargement  Right bundle branch block  ****Abnormal ECG****  When compared with ECG of 14-NOV-2018 12:00,  Premature supraventricular complexes are no longer Present  Questionable change in initial forces of Septal leads  Nonspecific T wave abnormality no longer evident in Inferior leads  Nonspecific T wave abnormality now evident in Lateral leads  Confirmed by ELIZA GIBBONS ([facility])JOE ([id number]) on 10/5/2020 2:40:13 PM   Pt with multiple recent falls and re-dislocation of L hip today. Not acting at mental status baseline today per daughter at bedside, hx UTIs. Given broadspectrum abx for ?delirium from infxn.  CTH obtaioned, no acute issues  Ortho cs obtained, reduced hip under procedural sedation, see separate note  Will admit for frequent falls, delirium, ?UTI, further ortho eval of hip dislocation with small periprosthetic avulsions

## 2020-11-11 NOTE — DIETITIAN INITIAL EVALUATION ADULT. - WEIGHT CHANGE
Patient Care Team:  Remi Varghese MD as PCP - General (General Surgery)  LIAM Garcia as Nurse Practitioner (Nurse Practitioner Family)      History and Physical:    Mr. Ivan Colbert is a [de-identified] yo male who has a history that includes HTN, hyperlipidemia,metastatic melanoma involving the liver, bone, celiac and right axillary node for which he receives monthly nivolumab and Xgeva. 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 He is on a statin daily. He denies any swelling of his face, neck or arms. He denies any SOB or cough. No HA or pressure behind eyes. He has a mediport in place. Francine Ponce is a [de-identified] y.o. male with the following history reviewed and recorded in [employer]:  Patient Active Problem List    Diagnosis Date Noted    Pruritus 03/18/2020    Cancer related pain 03/18/2020    Decrease in appetite 03/18/2020    Osteoarthritis of multiple joints 01/23/2020    Encounter for antineoplastic immunotherapy 12/26/2019    Liver metastasis ([address] 09/05/2019    Hypothyroidism due to medication 08/08/2019    Renal mass, left 08/08/2019    History of diverticulitis 08/08/2019    Malignant melanoma of skin of upper limb, including shoulder (HCC)      to liver, stomach, bone, and right axilla      Bone metastases ([address] 05/31/2019    Melanoma ([address]      to liver, stomach, bone, and right axilla      Hypertension     Hypercholesteremia     Diverticulitis     BPH (benign prostatic hyperplasia)      Current Outpatient Medications   Medication Sig Dispense Refill    hydrOXYzine (ATARAX) 25 MG tablet TAKE 1 TABLET BY MOUTH EVERY 8 HOURS AS NEEDED FOR ITCHING 90 tablet 1    montelukast (SINGULAIR) 10 MG tablet TAKE 1 TABLET BY MOUTH EVERY DAY AT NIGHT 90 tablet 1    levothyroxine (SYNTHROID) 88 MCG tablet Take one tablet by mouth daily.  90 tablet 3    dicyclomine (BENTYL) 20 MG tablet TAKE 1/2 TABLET BY MOUTH 4 TIMES A DAY AS NEEDED 180 tablet 3    PARoxetine (PAXIL) 10 MG tablet TAKE 1 TABLET BY MOUTH EVERY DAY 90 tablet 3    finasteride (PROSCAR) 5 MG tablet Take 5 mg by mouth      lovastatin (MEVACOR) 40 MG tablet Take 40 mg by mouth      triamcinolone (KENALOG) 0.025 % cream Apply topically 2 times daily. (Patient not taking: Reported on 2020) 30 g 2     No current facility-administered medications for this visit. Allergies: Patient has no known allergies. Past Medical History:   Diagnosis Date    Acid reflux     BPH (benign prostatic hyperplasia)     Cancer (HCC)     Diverticulitis     Hard of hearing     Hypercholesteremia     Hypertension     Malignant melanoma of skin of upper limb, including shoulder ([address] dx 2014    to liver, stomach, bone, and right axilla     Past Surgical History:   Procedure Laterality Date    CHOLECYSTECTOMY      TUNNELED CENTRAL VENOUS CATHETER W/ SUBCUTANEOUS PORT       Family History   Problem Relation Age of Onset    Heart Attack Brother          age 78 of MI     Social History     Tobacco Use    Smoking status: Former Smoker    Smokeless tobacco: Never Used   Substance Use Topics    Alcohol use: No       Old records have been obtained from the referring provider. These records have been reviewed and summarized. Review of Systems    Constitutional - no significant activity change, appetite change, or unexpected weight change. No fever or chills. No diaphoresis or significant fatigue. HENT - no significant rhinorrhea or epistaxis. No tinnitus or significant hearing loss. Eyes - no sudden vision change or amaurosis. Respiratory - no significant shortness of breath, wheezing, or stridor. No apnea, cough, or chest tightness associated with shortness of breath. Cardiovascular - no chest pain, syncope, or significant dizziness. No palpitations or significant leg swelling.   No claudication. Gastrointestinal - no abdominal swelling or pain. No blood in stool. No severe constipation, diarrhea, nausea, or vomiting. Genitourinary - No difficulty urinating, dysuria, frequency, or urgency. No flank pain or hematuria. Musculoskeletal - no back pain, gait disturbance, or myalgia. Skin - no color change, rash, pallor, or new wound. Neurologic - no dizziness, facial asymmetry, or light headedness. No seizures. No, AF,  speech difficulty or lateralizing weakness. Hematologic - no easy bruising or excessive bleeding. Psychiatric - no severe anxiety or nervousness. No confusion. All other review of systems are negative. Physical Exam    BP (!) 146/73 (Site: Left Upper Arm)   Pulse 66   Temp 98.6 °F (37 °C) (Temporal)   Resp 16   Ht 5' 11\" (1.803 m)   Wt 190 lb (86.2 kg)   SpO2 97%   BMI 26.50 kg/m²     Constitutional - well developed, well nourished. No diaphoresis or acute distress. HENT - head normocephalic. Right external ear canal appears normal.  Left external ear canal appears normal.  Septum appears midline. Eyes - conjunctiva normal.  EOMS normal.  No exudate. No icterus. Neck- ROM appears normal, no tracheal deviation. Mediport intact left chest wall. No wounds or signs of infection noted. Cardiovascular - Regular rate and rhythm. Heart sounds are normal.  No murmur, rub, or gallop. Carotid pulses are 2+ to palpation bilaterally without bruit. Extremities - Radial and ulnar pulses are 2+ to palpation bilaterally. No cyanosis, clubbing, or significant edema. No signs atheroembolic event. Pulmonary - effort appears normal.  No respiratory distress. Lungs - Breath sounds normal. No wheezes or rales. GI - Abdomen - soft, non tender, bowel sounds X 4 quadrants. No guarding or rebound tenderness. No distension or palpable mass. Genitourinary - deferred. Musculoskeletal - ROM appears normal.  No significant edema.   Neurologic - alert and oriented X 3.  Physiologic. Tongue midline. Face symmetric. No lateralizing weakness noted. Skin - warm, dry, and intact. No rash, erythema, or pallor. Psychiatric - mood, affect, and behavior appear normal.  Judgment and thought processes appear normal.    Risk factors for atherosclerosis of all vascular beds have been reviewed with the patient including:  Family history, tobacco abuse in all forms, elevated cholesterol, hyperlipidemia, and diabetes. Assessment      1. SVC stenosis      Plan      We will request the CT chest on disc for our viewing. After we have reviewed the imaging, we will call the patient with further recommendations.  Patient verbalized understanding yes

## 2020-11-13 NOTE — ED ADULT TRIAGE NOTE - WEIGHT METHOD
Meena Lozano  1985  Preferred Contact Number: [phone] (mobile)    Patient is requesting referral/order to another doctor , she had a card for the doctor but has lost it.    Please call.   stated

## 2021-02-06 NOTE — DIETITIAN INITIAL EVALUATION ADULT. - WEIGHT IN LBS
"WOUND CARE PROVIDER PROGRESS NOTE        HPI: 64-year-old male homelessness, EtOH use, tobacco dependence, chronic left lower extremity wound admitted 8/7/2020 with left lower extremity wound infection.  Patient was recently hospitalized at [facility] in April 2020, evaluated by orthopedic surgery who recommended wound care.  Wound culture grew MSSA and strep.  Patient was recently seen at [facility] ([place] prior to this admission was given a prescription for antibiotics but did not fill this.  Patient reports that he has had this wound for 8 to 10 years.  He reports that he fell and went \"ass over tea kettle\".  He reports that he would clean the wound almost daily with soap and water at the homeless shelter.  Per chart review, patient reported he developed the ulcer in May 2018 when he fell while hiking.  Patient was seen at wound care clinic in August 2018.  Patient had a  assisting him while he was living at [facility].  Wound VAC /was ordered however  had concerns with patient's compliancy and/ access to medical care.  Skilled nursing facility was contacted to have patient be admitted, however he was not accepted due to Medicaid.    Not on any blood thinners.  Patient ambulatory in [facility].  Allergies reviewed.  He denies any allergies.    Surgery date: 11/19/2020 by Dr. Olvera  Procedure:1.  Irrigation and debridement of multiple compartments of the left leg with 2   separate 16 cm x 5 cm superficial ulcerations in posterior knee and calf.  2.  AmnioFix biologic sheet placement, left leg.  3.  Wound VAC placement, left leg, 16x5 + 16x5 cm.          Interval hx:   9/11/2020: pt calm cooperative. On zyvox. Seen during VAC and two layer compression wrap change. Pt tolerating VAC and wraps. Wound decreased in size.   9/18/2020: Patient denies any fevers, chills, nausea, vomiting.  He is tolerating the veraflo wound VAC and 2 layer compression wrap.  Wound is decreasing in size.  Increased " granular tissue noted, wound edges have improved however he does have rolled edges to popliteal fossa area.  Patient calm and cooperative, watching sports.  9/30/2020: Denies fevers, chills, nausea, vomiting.  Tolerating wound VAC and 2 layer compression wrap.  Refused nail care.  Wound culture positive for strep A and Proteus.  10/7/2020: completed 5 day course of zyvox. Denies fevers chills nausea vomiting.  Seen during veraflo VAC and 2 layer compression wrap change.    10/14/2020: Patient denies fevers, chills, nausea, vomiting.  Patient wound is malodorous complaining of increased pain to the wound.  Increased slough noted to wound bed despite veraflo wound VAC.  Reconsult ID.  10/16/2020: Patient seen during wound VAC change with [facility] wound care PT. patient denies fever, chills, nausea, vomiting.  Patient reports pain to wound and increase in pain with wound VAC change.  Patient has granular tissue throughout wound with mild amount of slough to posterior aspect of leg.  Malodorous with VAC removal.  Wound VAC nursing changed.  10/29/2020: Wound VAC was discontinued by wound team on 10/23/2020 due to slow progress and collagen was started.  Patient has completed 7-day antibiotic course of meropenem for multidrug resistant Proteus vulgaris.  Patient found to have lice and has been treated.  Nonfoul-smelling odor present with dressing removal.  11/5/2020: Seen with wound team during 2 layer compression dressing change and for excisional debridement.  No odor noted today.  Thick layer of biofilm noted.  Wound edges continue to improve but still remain to medial aspect of wound.  11/17/2020: Seen with wound team during dressing and 2 layer compression wrap change.  Patient with severely thick scar tissue to wound edges.  Excisional debridement with injectable lidocaine and epinephrine performed today.  Patient denies any fevers, chills, nausea, vomiting.  11/23/2020: POD #4 SP left leg I&D with biologic and VAC  placement.  VAC functioning.  Foul odor coming from wound.  11/30/2020: POD #11.  Patient tired of walking around with wound VAC machine.  But agreeable to continue with wound VAC.  Mild odor coming from wound with wound VAC removal.  12/7/2020: POD #18.  Seen during wound VAC change.  Sitter no longer at bedside.  12/14/2020 POD # 25.  Seen during wound VAC change with wound team.  Previous wound culture from last week showed organisms but they were not worked up.  New wound culture to be taken today.  Odor remains slightly diminished.  Drainage heavy, slightly decreased from last week.  Continue with nystatin powder and silver foam.  Patient tired today.  12/16/2020: Wound culture 12/14/2020 + for Proteus Mirabilis.  Discussed case with ID, previously following this admit.  Recommended Augmentin for 1 week.  12/17/2020: Patient not drinking boost.  Has stockpile in room.  Dietary consulted.  Started Augmentin yesterday. Contacted micro to review culture.  So far growing Proteus and diphtheroids.  Micro to assess for fungal component.  1/4/2021: Wounds have  into 2 wounds again and are decreasing in size.  No longer doing silver foam but rather Arglaes powder and nystatin powder.  Odor remains.  Completed antibiotics.  Guardianship hearing is 1/29/2021.  Had arginaid supplement 4 times per day for 2 weeks that started on 12/17/2020.  1/22/2021: Seen with wound team for VAC change.  Odor is decreased.  Skin bridges have formed and now patient has 3 smaller wounds.  However there is increased periwound breakdown.  Patient experiencing pain with VAC change to proximal thigh.  Topical lidocaine used.  Wound VAC held for weekend due to periwound breakdown.  2/5/2021: Seen with wound team during VAC change.  Odor remains the same.  There is no longer skin bridge to the medial wound  it into 2.  Patient has drainage seeping out underneath the drape causing periwound irritation.  Excisional debridement  was performed today.  Patient has guardian.      Results:  Wound culture left leg 12/14/2020: No fungal growth, Proteus mirabilis  Wound culture left leg 12/7/2020:Light growth mixed organisms.   Covid screen not detected 11/17/2020  CBC 11/22/2020: WBC 6.5, hemoglobin and hematocrit 9.9/30.5.  Platelet count 403.  CBC with differential 10/14/2020: WBC 6, H&H 11/33.9  Wound culture: 9/28/2020: Positive for beta-hemolytic strep group A, Proteus.    Wound cx: 9/1/2020 mrsa, diphtheroids, beta hemolytic strep group A    8/7/2020: X-ray tib-fib left:  1.  Healed distal metaphyseal fractures of the left fibula and tibia with tibial IM layla in place.     2.  No acute fracture or dislocation.     3.  No radiopaque soft tissue foreign body.      Arterial studies:   9/2/2020  Right.    Doppler waveforms of the common femoral artery are of high amplitude and    triphasic.    Doppler waveforms at the ankle are brisk and triphasic.    Ankle-brachial index is normal.       Left.    Doppler waveforms of the common femoral artery are of high amplitude and    triphasic.    Doppler waveforms at the ankle are brisk and triphasic.    Ankle-brachial index is normal.    Unable to obtained popliteal waveforms due to wound bandages.          Exam:    Palpable PT pulse to left foot +1 foot   +2 DP left foot  Difficulty palpating popliteal due to scar tissue  Able to extend left leg to 160 degrees. Able to flex left leg around 80 degrees      Left lower leg full-thickness ulcer  Odor remains the same.  Drainage has decreased slightly  Medial wound is no longer .        Lateral ulcer:  Wound has decreased in size  Wound edges flat,  Granular tissue noted, with scattered /slough, biofilm to wound bed  Majority of slough to proximal aspect of wound where there is tenderness with palpation  Amnio fix skin substitute  incorporated  No periwound erythema  Increased periwound irritation from drainage  Edema controlled    Medial  ulcers:  Wound edges flat  Wound decreased in size    granular tissue with scattered slough and biofilm  Amnio fix  incorporated  Edema controlled  Increase periwound irritation            Photos predebridement  left leg medial view from 2/5/2021        Lateral leg                          PROCEDURE:   2% viscous topical lidocaine applied and allowed to dwell for approximately 5 minutes  -Curette used to debride wound beds.  Excisional debridement was performed to remove devitalized tissue until healthy, bleeding tissue was visualized.   Entire surface of both wounds to medial and lateral ulcers were debrided.  Total, 88 cm2 debrided.  Tissue debrided into the subcutaneous layer.    -Bleeding controlled with manual pressure.    -Wound care completed by wound RN, refer to flowsheet.  -Patient tolerated the procedure well, without c/o pain or discomfort.         Post debridement photo lateral wound        Posterior debridement photo medial wound                      ASSESSMENT/PLAN:  64-year-old male with homelessness, EtOH use, tobacco use, and chronic left leg ulcer.  SP left leg I&D with amnio fix and wound VAC placement by Dr. Olvera on 11/19/2020    -Wound has reverted back to 2 wounds.  Skin bridge to medial wound is no longer present   wounds have decreased in size.  There is increased periwound irritation.  Hydrofiber and hydrocolloid applied to periwound to protect it  -Slough present. Excisional debridement performed today.  Medically necessary to promote wound healing.  -Odor remains but is decreased, heavy drainage slightly decreased  -Edema controlled with 2 layer compression wrap.  -Patient has completed 1 week of Augmentin and L-arginine supplement for 2 weeks in December 2020  -No plans for [facility] at this time.      PLAN  -Continue wound VAC.  Change 3 times per week  -Add Hydrofiber and hydrocolloid to periwound  -Continue 3 times a week debridement to wound to reduce biofilm  -Continue Arglaes powder  and nystatin powder  -Continue 2 layer compression wrap extending to thigh  - Patient to continue to be seen by wound care team while admitted  Patient to continue to be followed by wound care nurse practitioner as needed      Discharge plan:  Working on group home placement.  Patient has guardian.      D/W: Patient, RN, Leonarda Schulte, [facility] wound team,      [phone]

## 2021-10-29 NOTE — ED ADULT NURSE NOTE - BREATHING, MLM
The radiologist says your MRI does not show signs of a stroke. There are mild chronic white matter microvascular ischemic changes, slightly worse than scan in 2008. We will need to keep working on your blood pressure and cholesterol to keep that from progressing. Tachypnea/Labored

## 2022-02-14 NOTE — ED ADULT NURSE NOTE - CAS EDN INTEG ASSESS
WDL Continue Regimen: benzoyl peroxide 10 % topical cleanser\\nCleanse affected area on face twice a day\\nclindamycin 1 % topical gel\\nApply to face twice a day\\nadapalene 0.3 % topical gel\\nApply to affected areas on face once daily bedtime\\nspironolactone 100 mg tablet. Detail Level: Zone

## 2022-10-12 NOTE — PATIENT PROFILE ADULT. - PATIENT REPRESENTATIVE NAME
Anesthesia Type: 1% lidocaine with 1:100,000 epinephrine and a 1:10 solution of 8.4% sodium bicarbonate same as above

## 2023-01-10 NOTE — DIETITIAN INITIAL EVALUATION ADULT. - ETIOLOGY
General: Awake, alert, confuse. No acute distress. Well developed, hydrated and nourished. Appears stated age.  Skin: Skin in warm, dry and intact without rashes or lesions. Appropriate color for ethnicity  HENMT: head normocephalic and atraumatic; bilateral external ears without swelling. no nasal discharge. moist oral mucosa. supple neck, trachea midline  EYES: Conjunctiva clear. nonicteric sclera. EOM intact, Eyelids are normal in appearance without swelling or lesions, no nystagmus  Cardiac: well perfused, s1, s2, rrr  Respiratory: breathing comfortably on room air. no audible wheezing or stridor  Abdominal: nondistended  MSK: Neck and back are without deformity, visible external skin changes, or signs of trauma. Curvature of the cervical, thoracic, and lumbar spine are within normal limits. no external signs of trauma. no apparent deficits in ROM of any extremity  Neurological: The patient is awake, alert, orinted to person. unable to fully test neurologic function as patient intermittntly repeating herself and not repsonding approprialtely to questions. no slurred speech. following commands, 5/5 strength of all extremities.
decreased ability to consume sufficient energy/protein 2/2 parkinson's and dementia?

## 2023-03-09 NOTE — ED ADULT NURSE NOTE - NS ED NURSE REPORT GIVEN DT
"Routing refill request to provider for review/approval because:  Patient needs to be seen because it has been more than 1 year since last office visit.    LOV: with Dr. Fragoso 3--no scheduled appointments    Jaylin WINN RN     Requested Prescriptions   Pending Prescriptions Disp Refills     albuterol (PROAIR HFA/PROVENTIL HFA/VENTOLIN HFA) 108 (90 Base) MCG/ACT inhaler 18 g 1     Sig: Inhale 2 puffs into the lungs every 4 hours as needed for shortness of breath, wheezing or other (cough)       Asthma Maintenance Inhalers - Anticholinergics Failed - 3/9/2023 12:31 PM        Failed - Asthma control assessment score within normal limits in last 6 months     Please review ACT score.           Failed - Recent (6 mo) or future (30 days) visit within the authorizing provider's specialty     Patient had office visit in the last 6 months or has a visit in the next 30 days with authorizing provider or within the authorizing provider's specialty.  See \"Patient Info\" tab in inbasket, or \"Choose Columns\" in Meds & Orders section of the refill encounter.            Passed - Patient is age 12 years or older        Passed - Medication is active on med list       Short-Acting Beta Agonist Inhalers Protocol  Failed - 3/9/2023 12:31 PM        Failed - Asthma control assessment score within normal limits in last 6 months     Please review ACT score.           Failed - Recent (6 mo) or future (30 days) visit within the authorizing provider's specialty     Patient had office visit in the last 6 months or has a visit in the next 30 days with authorizing provider or within the authorizing provider's specialty.  See \"Patient Info\" tab in inbasket, or \"Choose Columns\" in Meds & Orders section of the refill encounter.            Passed - Patient is age 12 or older        Passed - Medication is active on med list                           " 02-Dec-2018 22:31

## 2023-09-04 NOTE — ED PROVIDER NOTE - OBJECTIVE STATEMENT
78y/o f with PMHx of Neuropathy, Parkinson's disease, Scoliosis presents to ED s/p fall. Pt lives at [facility] in [place]. PT daughter at bedside states that nursing staff found her at 07.45 in the morning on the floor, in severe pain, with associated AMS and slurred speech. PT currently c/o of L hip pain and stiffness. Pt recently dislocated Hip, brought into ED yesterday night for treatment. Pt denies NVD, fever, chills, numbness, tingling, urinary sx, or other acute c/o at this time. Pt daughter at bedside states that pt's speech is currently at baseline. Bulgarian

## 2024-07-11 NOTE — CONSULT NOTE ADULT - CONSULT REASON
L hip dislocation
repeat L hemiarthroplasty dislocation
DVT/PE prophylaxis, risk stratification, anticoagulation management
patient

## 2024-12-18 NOTE — PHYSICAL THERAPY INITIAL EVALUATION ADULT - LIVES WITH, PROFILE
Take medication as prescribed. Follow-up with your podiatrist within 2 days for reassessment. Bring the results from this visit with you for their review. Return to the ED immediately for any new, worsening, or persistent symptoms, including increased swelling, discoloration, or any other medical concerns.      Resident of Assisted Living Facility

## 2025-04-17 NOTE — ED PROVIDER NOTE - TEMPLATE, MLM
Problem: Occupational Therapy - Adult  Goal: By Discharge: Performs self-care activities at highest level of function for planned discharge setting.  See evaluation for individualized goals.  Description: FUNCTIONAL STATUS PRIOR TO ADMISSION:  Patient was ambulatory and did not require DME. Pt with brain injury at baseline and on disability.      Receives Help From: Family, Prior Level of Assist for ADLs: Independent, Prior Level of Assist for Homemaking: Independent, Ambulation Assistance: Independent, Prior Level of Assist for Transfers: Independent, Active : No     HOME SUPPORT: Per case management notes patient lived niece. Pt indicates living alone with good family support and niece nearby.     Occupational Therapy Goals:  Initiated 4/17/2025  1.  Patient will perform grooming at sink with Modified Reva within 7 day(s).  2.  Patient will perform bathing with Modified Reva within 7 day(s).  3.  Patient will perform lower body dressing with Modified Reva within 7 day(s).  4.  Patient will perform toilet transfers with Modified Reva  within 7 day(s).  5.  Patient will perform all aspects of toileting with Modified Reva within 7 day(s).  6.  Patient will participate in upper extremity therapeutic exercise/activities with Reva for 10 minutes within 7 day(s).    7.  Patient will utilize energy conservation techniques during functional activities with verbal cues within 7 day(s).    Outcome: Progressing     OCCUPATIONAL THERAPY EVALUATION    Patient: Edvin Leal (65 y.o. male)  Date: 4/17/2025  Primary Diagnosis: Shortness of breath [R06.02]  Bilateral leg edema [R60.0]  History of CHF (congestive heart failure) [Z86.79]  Acute exacerbation of chronic heart failure (HCC) [I50.9]         Precautions: Fall Risk                  ASSESSMENT :  The patient is limited by decreased functional mobility, independence in ADLs, balance following admission for CHF  [facility]charge Destination, Physical Therapy, Volume 94, Issue 9, 1 September 2014, Pages 4894-1432, [email or web address]    Pain Rating:  None      Pain Intervention(s):   pain is at a level acceptable to the patient    Activity Tolerance:   Good    After treatment:   Patient left in no apparent distress sitting up in chair, Call bell within reach, and Bed/ chair alarm activated    COMMUNICATION/EDUCATION:   The patient's plan of care was discussed with: physical therapist and registered nurse    Patient Education  Education Given To: Patient  Education Provided: Role of Therapy;Transfer Training;Mobility Training;Fall Prevention Strategies  Education Method: Demonstration;Verbal  Barriers to Learning: Cognition  Education Outcome: Verbalized understanding    Thank you for this referral.  Millie Sims OT  Minutes: 17    Occupational Therapy Evaluation Charge Determination   History Examination Decision-Making   LOW Complexity : Brief history review  LOW Complexity: 1-3 Performance deficits relating to physical, cognitive, or psychosocial skills that result in activity limitations and/or participation restrictions LOW Complexity: No comorbidities that affect functional and  no verbal  or physical assist needed to complete eval tasks   Based on the above components, the patient evaluation is determined to be of the following complexity level: Low     General

## 2025-04-24 NOTE — ED PROVIDER NOTE - HEAD, MLM
Head is atraumatic. Head shape is symmetrical.
balance training/bed mobility training/gait training/transfer training

## 2025-05-15 NOTE — ED PROVIDER NOTE - MOUTH NORMAL
Health Maintenance       COVID-19 Vaccine (3 - 2024-25 season)  Overdue since 9/1/2024    Meningococcal Serogroup B Vaccine (1 of 2 - Standard)  Never done           Following review of the above:  Patient is not proceeding with: COVID-19 and Meningococcal Serogroup B    Note: Refer to final orders and clinician documentation.       abnormal/expand...